# Patient Record
Sex: FEMALE | Race: WHITE | Employment: OTHER | URBAN - METROPOLITAN AREA
[De-identification: names, ages, dates, MRNs, and addresses within clinical notes are randomized per-mention and may not be internally consistent; named-entity substitution may affect disease eponyms.]

---

## 2017-06-13 ENCOUNTER — OFFICE VISIT (OUTPATIENT)
Dept: FAMILY MEDICINE CLINIC | Age: 67
End: 2017-06-13

## 2017-06-13 ENCOUNTER — HOSPITAL ENCOUNTER (OUTPATIENT)
Dept: LAB | Age: 67
Discharge: HOME | End: 2017-06-13
Payer: MEDICARE

## 2017-06-13 VITALS
HEART RATE: 89 BPM | DIASTOLIC BLOOD PRESSURE: 73 MMHG | TEMPERATURE: 98 F | HEIGHT: 65 IN | WEIGHT: 151 LBS | SYSTOLIC BLOOD PRESSURE: 137 MMHG | RESPIRATION RATE: 16 BRPM | BODY MASS INDEX: 25.16 KG/M2 | OXYGEN SATURATION: 98 %

## 2017-06-13 DIAGNOSIS — R42 VERTIGO: Primary | ICD-10-CM

## 2017-06-13 DIAGNOSIS — Z86.73 HISTORY OF CVA (CEREBROVASCULAR ACCIDENT): ICD-10-CM

## 2017-06-13 PROCEDURE — 80053 COMPREHEN METABOLIC PANEL: CPT

## 2017-06-13 PROCEDURE — 85025 COMPLETE CBC W/AUTO DIFF WBC: CPT

## 2017-06-13 PROCEDURE — 80061 LIPID PANEL: CPT

## 2017-06-13 PROCEDURE — 84443 ASSAY THYROID STIM HORMONE: CPT

## 2017-06-13 RX ORDER — ASPIRIN 81 MG/1
81 TABLET ORAL
COMMUNITY
End: 2018-09-13

## 2017-06-13 NOTE — PROGRESS NOTES
1. Have you been to the ER, urgent care clinic since your last visit? Hospitalized since your last visit? No    2. Have you seen or consulted any other health care providers outside of the [address] [place] [address] since your last visit? Include any pap smears or colon screening. No   Chief Complaint   Patient presents with    Establish Care    Complete Physical    Labs Only     Pt present to the office Establish care, CPE, lab    Has hx of mini stroke in 2015 and full neg work up      Chief Complaint   Patient presents with    [facility] Care    Complete Physical    Labs Only     she is a 78y.o. year old female who presents for evalution. Reviewed PmHx, RxHx, FmHx, SocHx, AllgHx and updated and dated in the chart. Patient Active Problem List    Diagnosis    History of CVA (cerebrovascular accident)    Vertigo       Review of Systems - negative except as listed above in the HPI    Objective:     Vitals:    06/13/17 0811   BP: 137/73   Pulse: 89   Resp: 16   Temp: 98 °F (36.7 °C)   TempSrc: Oral   SpO2: 98%   Weight: 151 lb (68.5 kg)   Height: 5' 5\" (1.651 m)     Physical Examination: General appearance - alert, well appearing, and in no distress  Neck - supple, no significant adenopathy  Chest - clear to auscultation, no wheezes, rales or rhonchi, symmetric air entry  Heart - normal rate, regular rhythm, normal S1, S2, no murmurs, rubs, clicks or gallops  Abdomen - soft, nontender, nondistended, no masses or organomegaly  Extremities - peripheral pulses normal, no pedal edema, no clubbing or cyanosis    Assessment/ Plan:   Tasneem was seen today for establish care, complete physical and labs only.     Diagnoses and all orders for this visit:    Vertigo  -     LIPID PANEL  -     METABOLIC PANEL, COMPREHENSIVE  -     CBC WITH AUTOMATED DIFF  -     TSH 3RD GENERATION  -due to old CVA    History of CVA (cerebrovascular accident)  -     LIPID PANEL  -     METABOLIC PANEL, COMPREHENSIVE  -on ASA daily Follow-up Disposition:  Return in about 1 year (around 6/13/2018). I have discussed the diagnosis with the patient and the intended plan as seen in the above orders. The patient understands and agrees with the plan. The patient has received an after-visit summary and questions were answered concerning future plans. Medication Side Effects and Warnings were discussed with patient  Patient Labs were reviewed and or requested:  Patient Past Records were reviewed and or requested    Mari Garcia M.D. There are no Patient Instructions on file for this visit.

## 2017-06-13 NOTE — MR AVS SNAPSHOT
Visit Information Date & Time Provider Department Dept. Phone Encounter #  
 6/13/2017  8:10 AM Amie Muro MD [address] [facility] [phone] [id number] Follow-up Instructions Return in about 1 year (around 6/13/2018). Upcoming Health Maintenance Date Due Hepatitis C Screening 1950 DTaP/Tdap/Td series (1 - Tdap) 11/16/1970 BREAST CANCER SCRN MAMMOGRAM 11/16/1999 FOBT Q 1 YEAR AGE 50-75 11/16/1999 ZOSTER VACCINE AGE 60> 11/16/2009 GLAUCOMA SCREENING Q2Y 11/16/2014 OSTEOPOROSIS SCREENING (DEXA) 11/16/2014 Pneumococcal 65+ Low/Medium Risk (1 of 2 - PCV13) 11/16/2014 MEDICARE YEARLY EXAM 11/16/2014 INFLUENZA AGE 9 TO ADULT 8/1/2017 Allergies as of 6/13/2017  Review Complete On: 6/13/2017 By: Amie Muro MD  
  
 Severity Noted Reaction Type Reactions Ampicillin  06/13/2017    Hives Current Immunizations  Never Reviewed No immunizations on file. Not reviewed this visit You Were Diagnosed With   
  
 Codes Comments Vertigo    -  Primary ICD-10-CM: I34 ICD-9-CM: 780.4 History of CVA (cerebrovascular accident)     ICD-10-CM: Z86.73 
ICD-9-CM: V12.54 Vitals BP Pulse Temp Resp Height(growth percentile) Weight(growth percentile)  
 137/73 89 98 °F (36.7 °C) (Oral) 16 5' 5\" (1.651 m) 151 lb (68.5 kg) SpO2 BMI Smoking Status 98% 25.13 kg/m2 Never Smoker BMI and BSA Data Body Mass Index Body Surface Area  
 25.13 kg/m 2 1.77 m 2 Your Updated Medication List  
  
   
This list is accurate as of: 6/13/17  8:26 AM.  Always use your most recent med list.  
  
  
  
  
 aspirin delayed-release 81 mg tablet Take  by mouth daily. We Performed the Following CBC WITH AUTOMATED DIFF [06603 CPT(R)] LIPID PANEL [60153 CPT(R)] METABOLIC PANEL, COMPREHENSIVE [73146 CPT(R)] TSH 3RD GENERATION [46539 CPT(R)] Follow-up Instructions Return in about 1 year (around 6/13/2018). Introducing [facility] & HEALTH SERVICES! [employer] introduces [employer] patient portal. Now you can access parts of your medical record, email your doctor's office, and request medication refills online. 1. In your internet browser, go to [email or web address] (formerly [email or web address])/[employer] 2. Click on the First Time User? Click Here link in the Sign In box. You will see the New Member Sign Up page. 3. Enter your [employer] Access Code exactly as it appears below. You will not need to use this code after youve completed the sign-up process. If you do not sign up before the expiration date, you must request a new code. · [employer] Access Code: [email or web address] Expires: 9/11/2017  8:26 AM 
 
4. Enter the last four digits of your Social Security Number (xxxx) and Date of Birth (mm/dd/yyyy) as indicated and click Submit. You will be taken to the next sign-up page. 5. Create a Secoo ID. This will be your Secoo login ID and cannot be changed, so think of one that is secure and easy to remember. 6. Create a Secoo password. You can change your password at any time. 7. Enter your Password Reset Question and Answer. This can be used at a later time if you forget your password. 8. Enter your e-mail address. You will receive e-mail notification when new information is available in [address] 9. Click Sign Up. You can now view and download portions of your medical record. 10. Click the Download Summary menu link to download a portable copy of your medical information. If you have questions, please visit the Frequently Asked Questions section of the Secoo website. Remember, Secoo is NOT to be used for urgent needs. For medical emergencies, dial 911. Now available from your iPhone and Android! Please provide this summary of care documentation to your next provider. Your primary care clinician is listed as HARRIETT MARRERO.  If you have any questions after today's visit, please call [phone].

## 2017-06-14 LAB
ALBUMIN SERPL-MCNC: 4.5 G/DL (ref 3.6–4.8)
ALBUMIN/GLOB SERPL: 2 {RATIO} (ref 1.2–2.2)
ALP SERPL-CCNC: 74 IU/L (ref 39–117)
ALT SERPL-CCNC: 12 IU/L (ref 0–32)
AST SERPL-CCNC: 13 IU/L (ref 0–40)
BASOPHILS # BLD AUTO: 0 X10E3/UL (ref 0–0.2)
BASOPHILS NFR BLD AUTO: 0 %
BILIRUB SERPL-MCNC: 0.6 MG/DL (ref 0–1.2)
BUN SERPL-MCNC: 21 MG/DL (ref 8–27)
BUN/CREAT SERPL: 25 (ref 12–28)
CALCIUM SERPL-MCNC: 9.7 MG/DL (ref 8.7–10.3)
CHLORIDE SERPL-SCNC: 101 MMOL/L (ref 96–106)
CHOLEST SERPL-MCNC: 225 MG/DL (ref 100–199)
CO2 SERPL-SCNC: 23 MMOL/L (ref 18–29)
CREAT SERPL-MCNC: 0.85 MG/DL (ref 0.57–1)
EOSINOPHIL # BLD AUTO: 0.1 X10E3/UL (ref 0–0.4)
EOSINOPHIL NFR BLD AUTO: 1 %
ERYTHROCYTE [DISTWIDTH] IN BLOOD BY AUTOMATED COUNT: 14 % (ref 12.3–15.4)
GLOBULIN SER CALC-MCNC: 2.2 G/DL (ref 1.5–4.5)
GLUCOSE SERPL-MCNC: 80 MG/DL (ref 65–99)
HCT VFR BLD AUTO: 43.6 % (ref 34–46.6)
HDLC SERPL-MCNC: 61 MG/DL
HGB BLD-MCNC: 14.4 G/DL (ref 11.1–15.9)
IMM GRANULOCYTES # BLD: 0 X10E3/UL (ref 0–0.1)
IMM GRANULOCYTES NFR BLD: 0 %
INTERPRETATION, 910389: NORMAL
LDLC SERPL CALC-MCNC: 134 MG/DL (ref 0–99)
LYMPHOCYTES # BLD AUTO: 6.3 X10E3/UL (ref 0.7–3.1)
LYMPHOCYTES NFR BLD AUTO: 61 %
MCH RBC QN AUTO: 29.3 PG (ref 26.6–33)
MCHC RBC AUTO-ENTMCNC: 33 G/DL (ref 31.5–35.7)
MCV RBC AUTO: 89 FL (ref 79–97)
MONOCYTES # BLD AUTO: 0.3 X10E3/UL (ref 0.1–0.9)
MONOCYTES NFR BLD AUTO: 3 %
NEUTROPHILS # BLD AUTO: 3.6 X10E3/UL (ref 1.4–7)
NEUTROPHILS NFR BLD AUTO: 35 %
PLATELET # BLD AUTO: 247 X10E3/UL (ref 150–379)
POTASSIUM SERPL-SCNC: 4.9 MMOL/L (ref 3.5–5.2)
PROT SERPL-MCNC: 6.7 G/DL (ref 6–8.5)
RBC # BLD AUTO: 4.92 X10E6/UL (ref 3.77–5.28)
SODIUM SERPL-SCNC: 142 MMOL/L (ref 134–144)
TRIGL SERPL-MCNC: 151 MG/DL (ref 0–149)
TSH SERPL DL<=0.005 MIU/L-ACNC: 1.56 UIU/ML (ref 0.45–4.5)
VLDLC SERPL CALC-MCNC: 30 MG/DL (ref 5–40)
WBC # BLD AUTO: 10.4 X10E3/UL (ref 3.4–10.8)

## 2017-06-14 RX ORDER — ATORVASTATIN CALCIUM 20 MG/1
20 TABLET, FILM COATED ORAL DAILY
Qty: 30 TAB | Refills: 3 | Status: SHIPPED | OUTPATIENT
Start: 2017-06-14 | End: 2017-09-15 | Stop reason: SDUPTHER

## 2017-06-15 ENCOUNTER — DOCUMENTATION ONLY (OUTPATIENT)
Dept: FAMILY MEDICINE CLINIC | Age: 67
End: 2017-06-15

## 2017-06-15 NOTE — PROGRESS NOTES
Rx for statin called into pharmacy. Reviewed dose and directions with patient.  F/u appt for 3 month made for 9/11/2017

## 2017-07-27 ENCOUNTER — OFFICE VISIT (OUTPATIENT)
Dept: FAMILY MEDICINE CLINIC | Age: 67
End: 2017-07-27

## 2017-07-27 VITALS
DIASTOLIC BLOOD PRESSURE: 82 MMHG | SYSTOLIC BLOOD PRESSURE: 131 MMHG | TEMPERATURE: 98.3 F | RESPIRATION RATE: 16 BRPM | BODY MASS INDEX: 24.66 KG/M2 | HEART RATE: 91 BPM | WEIGHT: 148 LBS | HEIGHT: 65 IN | OXYGEN SATURATION: 98 %

## 2017-07-27 DIAGNOSIS — Z71.89 ADVANCE CARE PLANNING: ICD-10-CM

## 2017-07-27 DIAGNOSIS — Z00.00 ROUTINE GENERAL MEDICAL EXAMINATION AT A HEALTH CARE FACILITY: Primary | ICD-10-CM

## 2017-07-27 DIAGNOSIS — R21 RASH: ICD-10-CM

## 2017-07-27 RX ORDER — PREDNISONE 10 MG/1
TABLET ORAL
Qty: 1 PACKAGE | Refills: 0 | Status: SHIPPED | OUTPATIENT
Start: 2017-07-27 | End: 2017-08-04 | Stop reason: SDUPTHER

## 2017-07-27 NOTE — MR AVS SNAPSHOT
Visit Information Date & Time Provider Department Dept. Phone Encounter #  
 7/27/2017  9:45 AM Akira Ospina MD [address] [facility] [phone] [id number] Follow-up Instructions Return if symptoms worsen or fail to improve. Your Appointments 9/11/2017  7:30 AM  
ESTABLISHED PATIENT with Akira Ospina MD  
[address] [facility]) Appt Note: fasting labs, chol meds started last visit [address] [address] [phone]  
  
   
 [address] [address] Upcoming Health Maintenance Date Due Hepatitis C Screening 1950 DTaP/Tdap/Td series (1 - Tdap) 11/16/1970 BREAST CANCER SCRN MAMMOGRAM 11/16/1999 FOBT Q 1 YEAR AGE 50-75 11/16/1999 ZOSTER VACCINE AGE 60> 9/16/2009 GLAUCOMA SCREENING Q2Y 11/16/2014 OSTEOPOROSIS SCREENING (DEXA) 11/16/2014 Pneumococcal 65+ Low/Medium Risk (1 of 2 - PCV13) 11/16/2014 MEDICARE YEARLY EXAM 11/16/2014 INFLUENZA AGE 9 TO ADULT 8/1/2017 Allergies as of 7/27/2017  Review Complete On: 7/27/2017 By: Akira Ospina MD  
  
 Severity Noted Reaction Type Reactions Ampicillin  06/13/2017    Hives Current Immunizations  Never Reviewed No immunizations on file. Not reviewed this visit You Were Diagnosed With   
  
 Codes Comments Rash    -  Primary ICD-10-CM: R21 
ICD-9-CM: 782.1 Vitals BP Pulse Temp Resp Height(growth percentile) Weight(growth percentile) 131/82 91 98.3 °F (36.8 °C) (Oral) 16 5' 5\" (1.651 m) 148 lb (67.1 kg) SpO2 BMI Smoking Status 98% 24.63 kg/m2 Never Smoker BMI and BSA Data Body Mass Index Body Surface Area  
 24.63 kg/m 2 1.75 m 2 Your Updated Medication List  
  
   
This list is accurate as of: 7/27/17 10:11 AM.  Always use your most recent med list.  
  
  
  
  
 aspirin delayed-release 81 mg tablet Take  by mouth daily. atorvastatin 20 mg tablet Commonly known as:  LIPITOR Take 1 Tab by mouth daily. predniSONE 10 mg dose pack Commonly known as:  STERAPRED DS  
6 Day DS taper pack as directed Prescriptions Printed Refills  
 predniSONE (STERAPRED DS) 10 mg dose pack 0 Si Day DS taper pack as directed Class: Print Follow-up Instructions Return if symptoms worsen or fail to improve. Introducing [facility] & HEALTH SERVICES! Frdey Spence introduces [employer] patient portal. Now you can access parts of your medical record, email your doctor's office, and request medication refills online. 1. In your internet browser, go to [email or web address] 2. Click on the First Time User? Click Here link in the Sign In box. You will see the New Member Sign Up page. 3. Enter your [employer] Access Code exactly as it appears below. You will not need to use this code after youve completed the sign-up process. If you do not sign up before the expiration date, you must request a new code. · [employer] Access Code: [email or web address] Expires: 2017  8:26 AM 
 
4. Enter the last four digits of your Social Security Number (xxxx) and Date of Birth (mm/dd/yyyy) as indicated and click Submit. You will be taken to the next sign-up page. 5. Create a [employer] ID. This will be your [employer] login ID and cannot be changed, so think of one that is secure and easy to remember. 6. Create a [employer] password. You can change your password at any time. 7. Enter your Password Reset Question and Answer. This can be used at a later time if you forget your password. 8. Enter your e-mail address. You will receive e-mail notification when new information is available in [address] 9. Click Sign Up. You can now view and download portions of your medical record. 10. Click the Download Summary menu link to download a portable copy of your medical information. If you have questions, please visit the Frequently Asked Questions section of the [employer] website. Remember, [employer] is NOT to be used for urgent needs. For medical emergencies, dial 911. Now available from your iPhone and Android! Please provide this summary of care documentation to your next provider. Your primary care clinician is listed as HARRIETT MARRERO. If you have any questions after today's visit, please call [phone].

## 2017-07-27 NOTE — PROGRESS NOTES
Chief Complaint   Patient presents with    Rash     Pt present to the office for rash    1. Have you been to the ER, urgent care clinic since your last visit? Hospitalized since your last visit? No    2. Have you seen or consulted any other health care providers outside of the [address] [place] [address] since your last visit? Include any pap smears or colon screening. No        Medicare Wellness Exam:    Chief Complaint   Patient presents with    Rash     she is a 78y.o. year old female who presents for evaluation for their Medicare Wellness Visit. Mitcheal [place] is completed and assessed=yes  Depression Screen is completed and assessed=yes  Medication list reviewed and adjusted for accuracy=yes  Immunizations reviewed and updated=yes  Health/Preventative Screenings reviewed and updated=yes  ADL Functions reviewed=yes    Patient Active Problem List    Diagnosis    Advance care planning    History of CVA (cerebrovascular accident)    Vertigo       Reviewed PmHx, RxHx, FmHx, SocHx, AllgHx and updated and dated in the chart. Review of Systems - negative except as listed above in the HPI    Objective:     Vitals:    07/27/17 0953   BP: 131/82   Pulse: 91   Resp: 16   Temp: 98.3 °F (36.8 °C)   TempSrc: Oral   SpO2: 98%   Weight: 148 lb (67.1 kg)   Height: 5' 5\" (1.651 m)     Physical Examination: General appearance - alert, well appearing, and in no distress  Neck - supple, no significant adenopathy  Chest - clear to auscultation, no wheezes, rales or rhonchi, symmetric air entry  Heart - normal rate, regular rhythm, normal S1, S2, no murmurs, rubs, clicks or gallops  Skin - red rash 1mm all over    Assessment/ Plan:   Diagnoses and all orders for this visit:    1. Routine general medical examination at a health care facility  -see below    2. Rash  -     predniSONE (STERAPRED DS) 10 mg dose pack; 6 Day DS taper pack as directed  -chiggers vs other    3.  Advance care planning  -has Lw       -Pain evaluation performed in office  -Cognitive Screen performed in office  -Depression Screen, Fall risks (by up and go test)  and ADL functionality were addressed  -Medication list updated and reviewed for any changes   -A comprehensive review of medical issues and a plan was formulated  -End of life planning was addressed with pt   -Health Screenings for preventions were addressed and a plan was formulated  -Shingles Vaccine was recommended  -Discussed with patient cancer risk factors and appropriate screenings for age  -Patient evaluated for colonoscopy and referred if needed per screeing criteria  -Labs from previous visits were discussed with patient   -Discussed with patient diet and exercise and formulated a plan as needed  -An Advanced care plan was developed with the patient.  -Alcohol screening performed and was negative    -Follow-up Disposition:  Return if symptoms worsen or fail to improve. I have discussed the diagnosis with the patient and the intended plan as seen in the above orders. The patient understands and agrees with the plan. The patient has received an after-visit summary and questions were answered concerning future plans. Medication Side Effects and Warnings were discussed with patien  Patient Labs were reviewed and or requested  Patient Past Records were reviewed and or requested    There are no Patient Instructions on file for this visit.       Dakota Estrella M.D.                '

## 2017-08-04 DIAGNOSIS — R21 RASH: ICD-10-CM

## 2017-08-04 RX ORDER — PREDNISONE 10 MG/1
TABLET ORAL
Qty: 1 PACKAGE | Refills: 0 | Status: SHIPPED | OUTPATIENT
Start: 2017-08-04 | End: 2017-09-11 | Stop reason: ALTCHOICE

## 2017-09-11 ENCOUNTER — OFFICE VISIT (OUTPATIENT)
Dept: FAMILY MEDICINE CLINIC | Age: 67
End: 2017-09-11

## 2017-09-11 ENCOUNTER — HOSPITAL ENCOUNTER (OUTPATIENT)
Dept: LAB | Age: 67
Discharge: HOME | End: 2017-09-11
Payer: MEDICARE

## 2017-09-11 VITALS
DIASTOLIC BLOOD PRESSURE: 84 MMHG | HEART RATE: 77 BPM | WEIGHT: 150 LBS | RESPIRATION RATE: 18 BRPM | SYSTOLIC BLOOD PRESSURE: 130 MMHG | OXYGEN SATURATION: 96 % | BODY MASS INDEX: 24.99 KG/M2 | HEIGHT: 65 IN | TEMPERATURE: 46.8 F

## 2017-09-11 DIAGNOSIS — E78.00 HIGH CHOLESTEROL: Primary | ICD-10-CM

## 2017-09-11 DIAGNOSIS — Z86.73 HISTORY OF CVA (CEREBROVASCULAR ACCIDENT): ICD-10-CM

## 2017-09-11 PROCEDURE — 80061 LIPID PANEL: CPT

## 2017-09-11 PROCEDURE — 80053 COMPREHEN METABOLIC PANEL: CPT

## 2017-09-11 NOTE — PROGRESS NOTES
Patient here for fasting labs. 1. Have you been to the ER, urgent care clinic since your last visit? Hospitalized since your last visit? No    2. Have you seen or consulted any other health care providers outside of the [address] [place] [address] since your last visit? Include any pap smears or colon screening. No       Chief Complaint   Patient presents with    Labs     fasting     She is a 78 y.o. female who presents for evalution. Reviewed PmHx, RxHx, FmHx, SocHx, AllgHx and updated and dated in the chart. Patient Active Problem List    Diagnosis    Advance care planning    History of CVA (cerebrovascular accident)    Vertigo       Review of Systems - negative except as listed above in the HPI    Objective:     Vitals:    09/11/17 0727   BP: 130/84   Pulse: 77   Resp: 18   Temp: (!) 46.8 °F (8.2 °C)   SpO2: 96%   Weight: 150 lb (68 kg)   Height: 5' 5\" (1.651 m)     Physical Examination: General appearance - alert, well appearing, and in no distress  Neck - supple, no significant adenopathy  Chest - clear to auscultation, no wheezes, rales or rhonchi, symmetric air entry  Heart - normal rate, regular rhythm, normal S1, S2, no murmurs, rubs, clicks or gallops    Assessment/ Plan:   Diagnoses and all orders for this visit:    1. High cholesterol  -     LIPID PANEL  -     METABOLIC PANEL, COMPREHENSIVE    2. History of CVA (cerebrovascular accident)  -stable       Follow-up Disposition:  Return in about 6 months (around 3/11/2018). I have discussed the diagnosis with the patient and the intended plan as seen in the above orders. The patient understands and agrees with the plan. The patient has received an after-visit summary and questions were answered concerning future plans. Medication Side Effects and Warnings were discussed with patient  Patient Labs were reviewed and or requested:  Patient Past Records were reviewed and or requested    Ankur Cheng M.D.     There are no Patient Instructions on file for this visit.

## 2017-09-11 NOTE — LETTER
9/12/2017 4:26 PM 
 
Ms. Tawnya Oppenheim [address] [address] Dear Tawnya Oppenheim: 
 
Please find your most recent results below. Resulted Orders LIPID PANEL Result Value Ref Range Cholesterol, total 133 100 - 199 mg/dL Triglyceride 119 0 - 149 mg/dL HDL Cholesterol 64 >39 mg/dL VLDL, calculated 24 5 - 40 mg/dL LDL, calculated 45 0 - 99 mg/dL Narrative Performed at:  [address]  [id number] : Orlando Bojorquez MD, Phone:  [phone] METABOLIC PANEL, COMPREHENSIVE Result Value Ref Range Glucose 77 65 - 99 mg/dL BUN 14 8 - 27 mg/dL Creatinine 0.80 0.57 - 1.00 mg/dL GFR est non-AA 77 >59 mL/min/1.73 GFR est AA 88 >59 mL/min/1.73  
 BUN/Creatinine ratio 18 12 - 28 Sodium 143 134 - 144 mmol/L Potassium 4.4 3.5 - 5.2 mmol/L Chloride 104 96 - 106 mmol/L  
 CO2 26 18 - 29 mmol/L Calcium 8.8 8.7 - 10.3 mg/dL Protein, total 6.3 6.0 - 8.5 g/dL Albumin 4.3 3.6 - 4.8 g/dL GLOBULIN, TOTAL 2.0 1.5 - 4.5 g/dL A-G Ratio 2.2 1.2 - 2.2 Bilirubin, total 0.5 0.0 - 1.2 mg/dL Alk. phosphatase 60 39 - 117 IU/L  
 AST (SGOT) 12 0 - 40 IU/L  
 ALT (SGPT) 11 0 - 32 IU/L Narrative Performed at:  [address]  [id number] : Orlando Bojorquez MD, Phone:  [phone] CVD REPORT Result Value Ref Range INTERPRETATION Note Comment:  
   Supplement report is available. Narrative Performed at:  [address] [place] [address]  [id number] : Susan Peters PhD, Phone:  [phone] RECOMMENDATIONS: 
After reviewing your medical history and your lab results, they appear at goal for you!    
 
Let us make 2017 a great year! Dr. Pascual Chavez M.D. Good Help to those in Need! !!  
    
   
 
 
 
Please call me if you have any questions: [phone] Sincerely, Filomena Nayak, MD

## 2017-09-11 NOTE — MR AVS SNAPSHOT
Visit Information Date & Time Provider Department Dept. Phone Encounter #  
 9/11/2017  7:30 AM Gabriela Morales MD [address] [facility] [phone] [id number] Follow-up Instructions Return in about 6 months (around 3/11/2018). Upcoming Health Maintenance Date Due DTaP/Tdap/Td series (1 - Tdap) 11/16/1970 BREAST CANCER SCRN MAMMOGRAM 11/16/1999 FOBT Q 1 YEAR AGE 50-75 11/16/1999 ZOSTER VACCINE AGE 60> 9/16/2009 GLAUCOMA SCREENING Q2Y 11/16/2014 OSTEOPOROSIS SCREENING (DEXA) 11/16/2014 Pneumococcal 65+ Low/Medium Risk (1 of 2 - PCV13) 11/16/2014 MEDICARE YEARLY EXAM 7/28/2018 Allergies as of 9/11/2017  Review Complete On: 9/11/2017 By: Gabriela Morales MD  
  
 Severity Noted Reaction Type Reactions Ampicillin  06/13/2017    Hives Current Immunizations  Never Reviewed No immunizations on file. Not reviewed this visit You Were Diagnosed With   
  
 Codes Comments High cholesterol    -  Primary ICD-10-CM: E78.00 ICD-9-CM: 272.0 History of CVA (cerebrovascular accident)     ICD-10-CM: Z86.73 
ICD-9-CM: V12.54 Vitals BP Pulse Temp Resp Height(growth percentile) Weight(growth percentile) 130/84 77 (!) 46.8 °F (8.2 °C) 18 5' 5\" (1.651 m) 150 lb (68 kg) SpO2 BMI OB Status Smoking Status 96% 24.96 kg/m2 Menopause Never Smoker Vitals History BMI and BSA Data Body Mass Index Body Surface Area 24.96 kg/m 2 1.77 m 2 Preferred Pharmacy Pharmacy Name Phone [facility] [phone] Your Updated Medication List  
  
   
This list is accurate as of: 9/11/17  7:38 AM.  Always use your most recent med list.  
  
  
  
  
 aspirin delayed-release 81 mg tablet Take  by mouth daily. atorvastatin 20 mg tablet Commonly known as:  LIPITOR Take 1 Tab by mouth daily. We Performed the Following LIPID PANEL [37619 CPT(R)] METABOLIC PANEL, COMPREHENSIVE [52038 CPT(R)] Follow-up Instructions Return in about 6 months (around 3/11/2018). Introducing [facility] & [facility]! Porfirio Echavarrai introduces [employer] patient portal. Now you can access parts of your medical record, email your doctor's office, and request medication refills online. 1. In your internet browser, go to [email or web address] 2. Click on the First Time User? Click Here link in the Sign In box. You will see the New Member Sign Up page. 3. Enter your [employer] Access Code exactly as it appears below. You will not need to use this code after youve completed the sign-up process. If you do not sign up before the expiration date, you must request a new code. · [employer] Access Code: [email or web address] Expires: 9/11/2017  8:26 AM 
 
4. Enter the last four digits of your Social Security Number (xxxx) and Date of Birth (mm/dd/yyyy) as indicated and click Submit. You will be taken to the next sign-up page. 5. Create a [employer] ID. This will be your [employer] login ID and cannot be changed, so think of one that is secure and easy to remember. 6. Create a [employer] password. You can change your password at any time. 7. Enter your Password Reset Question and Answer. This can be used at a later time if you forget your password. 8. Enter your e-mail address. You will receive e-mail notification when new information is available in [address] 9. Click Sign Up. You can now view and download portions of your medical record. 10. Click the Download Summary menu link to download a portable copy of your medical information. If you have questions, please visit the Frequently Asked Questions section of the [employer] website. Remember, [employer] is NOT to be used for urgent needs. For medical emergencies, dial 911. Now available from your iPhone and Android! Please provide this summary of care documentation to your next provider. Your primary care clinician is listed as HARRIETT MARRERO. If you have any questions after today's visit, please call [phone].

## 2017-09-12 LAB
ALBUMIN SERPL-MCNC: 4.3 G/DL (ref 3.6–4.8)
ALBUMIN/GLOB SERPL: 2.2 {RATIO} (ref 1.2–2.2)
ALP SERPL-CCNC: 60 IU/L (ref 39–117)
ALT SERPL-CCNC: 11 IU/L (ref 0–32)
AST SERPL-CCNC: 12 IU/L (ref 0–40)
BILIRUB SERPL-MCNC: 0.5 MG/DL (ref 0–1.2)
BUN SERPL-MCNC: 14 MG/DL (ref 8–27)
BUN/CREAT SERPL: 18 (ref 12–28)
CALCIUM SERPL-MCNC: 8.8 MG/DL (ref 8.7–10.3)
CHLORIDE SERPL-SCNC: 104 MMOL/L (ref 96–106)
CHOLEST SERPL-MCNC: 133 MG/DL (ref 100–199)
CO2 SERPL-SCNC: 26 MMOL/L (ref 18–29)
CREAT SERPL-MCNC: 0.8 MG/DL (ref 0.57–1)
GLOBULIN SER CALC-MCNC: 2 G/DL (ref 1.5–4.5)
GLUCOSE SERPL-MCNC: 77 MG/DL (ref 65–99)
HDLC SERPL-MCNC: 64 MG/DL
INTERPRETATION, 910389: NORMAL
LDLC SERPL CALC-MCNC: 45 MG/DL (ref 0–99)
POTASSIUM SERPL-SCNC: 4.4 MMOL/L (ref 3.5–5.2)
PROT SERPL-MCNC: 6.3 G/DL (ref 6–8.5)
SODIUM SERPL-SCNC: 143 MMOL/L (ref 134–144)
TRIGL SERPL-MCNC: 119 MG/DL (ref 0–149)
VLDLC SERPL CALC-MCNC: 24 MG/DL (ref 5–40)

## 2017-09-12 NOTE — PROGRESS NOTES
A letter was sent, to the address on file, with lab results and Dr. Radha Orellana recommendations for the pt.

## 2017-09-12 NOTE — PROGRESS NOTES
After reviewing your medical history and your lab results, they appear at goal for you! Let us make 2017 a great year! Dr. Lynn Swift M.D.       Good Help to those in Need!!!

## 2017-10-10 RX ORDER — VALACYCLOVIR HYDROCHLORIDE 500 MG/1
500 TABLET, FILM COATED ORAL 2 TIMES DAILY
Qty: 14 TAB | Refills: 0 | Status: SHIPPED | OUTPATIENT
Start: 2017-10-10 | End: 2018-01-02 | Stop reason: SDUPTHER

## 2018-01-02 RX ORDER — VALACYCLOVIR HYDROCHLORIDE 500 MG/1
500 TABLET, FILM COATED ORAL 2 TIMES DAILY
Qty: 14 TAB | Refills: 5 | Status: SHIPPED | OUTPATIENT
Start: 2018-01-02 | End: 2020-05-01 | Stop reason: SDUPTHER

## 2018-02-23 RX ORDER — ATORVASTATIN CALCIUM 20 MG/1
20 TABLET, FILM COATED ORAL DAILY
Qty: 30 TAB | Refills: 5 | Status: SHIPPED | OUTPATIENT
Start: 2018-02-23 | End: 2018-03-02 | Stop reason: SDUPTHER

## 2018-03-02 RX ORDER — ATORVASTATIN CALCIUM 20 MG/1
20 TABLET, FILM COATED ORAL DAILY
Qty: 90 TAB | Refills: 1 | Status: SHIPPED | OUTPATIENT
Start: 2018-03-02 | End: 2018-09-12

## 2018-03-12 ENCOUNTER — OFFICE VISIT (OUTPATIENT)
Dept: FAMILY MEDICINE CLINIC | Age: 68
End: 2018-03-12

## 2018-03-12 ENCOUNTER — HOSPITAL ENCOUNTER (OUTPATIENT)
Dept: LAB | Age: 68
Discharge: HOME | End: 2018-03-12
Payer: MEDICARE

## 2018-03-12 VITALS
TEMPERATURE: 98.4 F | RESPIRATION RATE: 16 BRPM | OXYGEN SATURATION: 98 % | SYSTOLIC BLOOD PRESSURE: 126 MMHG | DIASTOLIC BLOOD PRESSURE: 78 MMHG | HEIGHT: 65 IN | HEART RATE: 88 BPM | BODY MASS INDEX: 26.37 KG/M2 | WEIGHT: 158.3 LBS

## 2018-03-12 DIAGNOSIS — R42 VERTIGO: ICD-10-CM

## 2018-03-12 PROCEDURE — 80061 LIPID PANEL: CPT

## 2018-03-12 PROCEDURE — 80053 COMPREHEN METABOLIC PANEL: CPT

## 2018-03-12 NOTE — PROGRESS NOTES
Chief Complaint   Patient presents with    Cholesterol Problem    Labs     1. Have you been to the ER, urgent care clinic since your last visit? Hospitalized since your last visit? No    2. Have you seen or consulted any other health care providers outside of the [facility] since your last visit? Include any pap smears or colon screening. No         Chief Complaint   Patient presents with    Cholesterol Problem    Labs     She is a 75 y.o. female who presents for evalution. Reviewed PmHx, RxHx, FmHx, SocHx, AllgHx and updated and dated in the chart. Patient Active Problem List    Diagnosis    Advance care planning    History of CVA (cerebrovascular accident)    Vertigo       Review of Systems - negative except as listed above in the HPI    Objective:     Vitals:    03/12/18 0721   BP: 126/78   Pulse: 88   Resp: 16   Temp: 98.4 °F (36.9 °C)   SpO2: 98%   Weight: 158 lb 4.8 oz (71.8 kg)   Height: 5' 5\" (1.651 m)     Physical Examination: General appearance - alert, well appearing, and in no distress  Chest - clear to auscultation, no wheezes, rales or rhonchi, symmetric air entry  Heart - normal rate, regular rhythm, normal S1, S2, no murmurs, rubs, clicks or gallops    Assessment/ Plan:   Diagnoses and all orders for this visit:    1. Weakness due to cerebrovascular accident (CVA) ([address]  -     METABOLIC PANEL, COMPREHENSIVE  -     LIPID PANEL    2. Vertigo  -stable       Follow-up Disposition:  Return in about 6 months (around 9/12/2018). I have discussed the diagnosis with the patient and the intended plan as seen in the above orders. The patient understands and agrees with the plan. The patient has received an after-visit summary and questions were answered concerning future plans. Medication Side Effects and Warnings were discussed with patient  Patient Labs were reviewed and or requested:  Patient Past Records were reviewed and or requested    Nicolasa Taylor M.D.     There are no Patient Instructions on file for this visit.

## 2018-03-12 NOTE — MR AVS SNAPSHOT
[address] [place] [address] 
[phone] Patient: Abdirahman Wolff MRN: [id number] UEM:61/43/7641 Visit Information Date & Time Provider Department Dept. Phone Encounter #  
 3/12/2018  7:15 AM Chai Payton MD 5900 [facility] [phone] [id number] Follow-up Instructions Return in about 6 months (around 9/12/2018). Upcoming Health Maintenance Date Due DTaP/Tdap/Td series (1 - Tdap) 11/16/1970 BREAST CANCER SCRN MAMMOGRAM 11/16/1999 FOBT Q 1 YEAR AGE 50-75 11/16/1999 ZOSTER VACCINE AGE 60> 9/16/2009 GLAUCOMA SCREENING Q2Y 11/16/2014 Bone Densitometry (Dexa) Screening 11/16/2014 Pneumococcal 65+ Low/Medium Risk (1 of 2 - PCV13) 11/16/2014 MEDICARE YEARLY EXAM 7/28/2018 Allergies as of 3/12/2018  Review Complete On: 3/12/2018 By: Chai Payton MD  
  
 Severity Noted Reaction Type Reactions Ampicillin  06/13/2017    Hives Current Immunizations  Never Reviewed No immunizations on file. Not reviewed this visit You Were Diagnosed With   
  
 Codes Comments Weakness due to cerebrovascular accident (CVA) ([facility] [address]    -  Primary ICD-10-CM: I63.9, R53.1 ICD-9-CM: 434.91, 780.79 Vertigo     ICD-10-CM: M42 ICD-9-CM: 780.4 Vitals BP Pulse Temp Resp Height(growth percentile) Weight(growth percentile) 126/78 88 98.4 °F (36.9 °C) 16 5' 5\" (1.651 m) 158 lb 4.8 oz (71.8 kg) SpO2 BMI OB Status Smoking Status 98% 26.34 kg/m2 Menopause Never Smoker Vitals History BMI and BSA Data Body Mass Index Body Surface Area  
 26.34 kg/m 2 1.81 m 2 Preferred Pharmacy Pharmacy Name Phone [phone] Your Updated Medication List  
  
   
This list is accurate as of 3/12/18  7:42 AM.  Always use your most recent med list.  
  
  
  
  
 aspirin delayed-release 81 mg tablet Take  by mouth daily. atorvastatin 20 mg tablet Commonly known as:  LIPITOR Take 1 Tab by mouth daily. We Performed the Following LIPID PANEL [56132 CPT(R)] METABOLIC PANEL, COMPREHENSIVE [57109 CPT(R)] Follow-up Instructions Return in about 6 months (around 9/12/2018). Introducing [facility] & HEALTH SERVICES! Gus Langford introduces [employer] patient portal. Now you can access parts of your medical record, email your doctor's office, and request medication refills online. 1. In your internet browser, go to [email or web address] 2. Click on the First Time User? Click Here link in the Sign In box. You will see the New Member Sign Up page. 3. Enter your [employer] Access Code exactly as it appears below. You will not need to use this code after youve completed the sign-up process. If you do not sign up before the expiration date, you must request a new code. · [employer] Access Code: [email or web address] Expires: 6/10/2018  7:42 AM 
 
4. Enter the last four digits of your Social Security Number (xxxx) and Date of Birth (mm/dd/yyyy) as indicated and click Submit. You will be taken to the next sign-up page. 5. Create a [employer] ID. This will be your [employer] login ID and cannot be changed, so think of one that is secure and easy to remember. 6. Create a [employer] password. You can change your password at any time. 7. Enter your Password Reset Question and Answer. This can be used at a later time if you forget your password. 8. Enter your e-mail address. You will receive e-mail notification when new information is available in [address] 9. Click Sign Up. You can now view and download portions of your medical record. 10. Click the Download Summary menu link to download a portable copy of your medical information.  
 
If you have questions, please visit the Frequently Asked Questions section of the [employer]. Remember, [employer] is NOT to be used for urgent needs. For medical emergencies, dial 911. Now available from your iPhone and Android! Please provide this summary of care documentation to your next provider. Your primary care clinician is listed as HARRIETT MARRERO. If you have any questions after today's visit, please call [phone].

## 2018-03-13 LAB
ALBUMIN SERPL-MCNC: 4.5 G/DL (ref 3.6–4.8)
ALBUMIN/GLOB SERPL: 2.4 {RATIO} (ref 1.2–2.2)
ALP SERPL-CCNC: 75 IU/L (ref 39–117)
ALT SERPL-CCNC: 18 IU/L (ref 0–32)
AST SERPL-CCNC: 15 IU/L (ref 0–40)
BILIRUB SERPL-MCNC: 0.7 MG/DL (ref 0–1.2)
BUN SERPL-MCNC: 16 MG/DL (ref 8–27)
BUN/CREAT SERPL: 22 (ref 12–28)
CALCIUM SERPL-MCNC: 9.1 MG/DL (ref 8.7–10.3)
CHLORIDE SERPL-SCNC: 101 MMOL/L (ref 96–106)
CHOLEST SERPL-MCNC: 150 MG/DL (ref 100–199)
CO2 SERPL-SCNC: 24 MMOL/L (ref 18–29)
CREAT SERPL-MCNC: 0.73 MG/DL (ref 0.57–1)
GFR SERPLBLD CREATININE-BSD FMLA CKD-EPI: 85 ML/MIN/1.73
GFR SERPLBLD CREATININE-BSD FMLA CKD-EPI: 98 ML/MIN/1.73
GLOBULIN SER CALC-MCNC: 1.9 G/DL (ref 1.5–4.5)
GLUCOSE SERPL-MCNC: 82 MG/DL (ref 65–99)
HDLC SERPL-MCNC: 58 MG/DL
INTERPRETATION, 910389: NORMAL
LDLC SERPL CALC-MCNC: 67 MG/DL (ref 0–99)
POTASSIUM SERPL-SCNC: 4.5 MMOL/L (ref 3.5–5.2)
PROT SERPL-MCNC: 6.4 G/DL (ref 6–8.5)
SODIUM SERPL-SCNC: 144 MMOL/L (ref 134–144)
TRIGL SERPL-MCNC: 127 MG/DL (ref 0–149)
VLDLC SERPL CALC-MCNC: 25 MG/DL (ref 5–40)

## 2018-03-13 NOTE — PROGRESS NOTES
After reviewing your labs, I believe they are within normal  limits for your age and let us stay with our current plan of action. In addition, you may receive a [employer] from our office. Thank you in advance for filling this out for us, and if you cannot   give a 10 on our ratings, please reach out to us and let us know  what we can do better for you! We strive for a ten, and while we   may not be perfect 100% of the time, we always try our best for  our patients! Abdifatah Arauz M.D.   Good Help to Those in Need

## 2018-04-24 ENCOUNTER — OFFICE VISIT (OUTPATIENT)
Dept: FAMILY MEDICINE CLINIC | Age: 68
End: 2018-04-24

## 2018-04-24 VITALS
DIASTOLIC BLOOD PRESSURE: 77 MMHG | WEIGHT: 158.6 LBS | RESPIRATION RATE: 16 BRPM | TEMPERATURE: 97.9 F | HEIGHT: 65 IN | BODY MASS INDEX: 26.42 KG/M2 | HEART RATE: 81 BPM | OXYGEN SATURATION: 97 % | SYSTOLIC BLOOD PRESSURE: 112 MMHG

## 2018-04-24 DIAGNOSIS — I63.49 CEREBROVASCULAR ACCIDENT (CVA) DUE TO EMBOLISM OF OTHER CEREBRAL ARTERY (HCC): ICD-10-CM

## 2018-04-24 DIAGNOSIS — R42 VERTIGO: Primary | ICD-10-CM

## 2018-04-24 PROBLEM — Z86.73 HISTORY OF CVA (CEREBROVASCULAR ACCIDENT): Status: RESOLVED | Noted: 2017-06-13 | Resolved: 2018-04-24

## 2018-04-24 NOTE — PROGRESS NOTES
Chief Complaint   Patient presents with    Annual Wellness Visit    Dizziness    Sleep Problem     1. Have you been to the ER, urgent care clinic since your last visit? Hospitalized since your last visit? No    2. Have you seen or consulted any other health care providers outside of the [address] [place] [address] since your last visit? Include any pap smears or colon screening.  No    No visit--too soon

## 2018-07-23 ENCOUNTER — OFFICE VISIT (OUTPATIENT)
Dept: FAMILY MEDICINE CLINIC | Age: 68
End: 2018-07-23

## 2018-07-23 VITALS
SYSTOLIC BLOOD PRESSURE: 156 MMHG | WEIGHT: 156 LBS | HEIGHT: 65 IN | RESPIRATION RATE: 16 BRPM | HEART RATE: 80 BPM | DIASTOLIC BLOOD PRESSURE: 88 MMHG | BODY MASS INDEX: 25.99 KG/M2 | TEMPERATURE: 98 F | OXYGEN SATURATION: 99 %

## 2018-07-23 DIAGNOSIS — S80.869A FLEA BITE OF LOWER LEG, UNSPECIFIED LATERALITY, INITIAL ENCOUNTER: Primary | ICD-10-CM
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
## 2018-07-23 DIAGNOSIS — L28.2 PRURITIC RASH: ICD-10-CM

## 2018-07-23 RX ORDER — PREDNISONE 5 MG/1
TABLET ORAL
Qty: 21 TAB | Refills: 0 | Status: SHIPPED | OUTPATIENT
Start: 2018-07-23 | End: 2018-08-13 | Stop reason: ALTCHOICE

## 2018-07-23 RX ORDER — HYDROXYZINE PAMOATE 25 MG/1
25 CAPSULE ORAL
Qty: 45 CAP | Refills: 0 | Status: SHIPPED | OUTPATIENT
Start: 2018-07-23 | End: 2018-08-06

## 2018-07-23 NOTE — PROGRESS NOTES
1. Have you been to the ER, urgent care clinic since your last visit? Hospitalized since your last visit? No    2. Have you seen or consulted any other health care providers outside of the [address] [place] [address] since your last visit? Include any pap smears or colon screening. No     Chief Complaint   Patient presents with    Skin Problem     Itching all over, x3 days     Subjective: (As above and below)     Chief Complaint   Patient presents with    Skin Problem     Itching all over, x3 days     she is a 75y.o. year old female who presents for evaluation. Reviewed PmHx, RxHx, FmHx, SocHx, AllgHx and updated in chart. Review of Systems - negative except as listed above    Objective:     Vitals:    07/23/18 0727   BP: 156/88   Pulse: 80   Resp: 16   Temp: 98 °F (36.7 °C)   TempSrc: Oral   SpO2: 99%   Weight: 156 lb (70.8 kg)   Height: 5' 5\" (1.651 m)     Physical Examination: General appearance - alert, well appearing, and in no distress  Mental status - normal mood, behavior, speech, dress, motor activity, and thought processes  Mouth - mucous membranes moist, pharynx normal without lesions  Chest - clear to auscultation, no wheezes, rales or rhonchi, symmetric air entry  Heart - normal rate, regular rhythm, normal S1, S2, no murmurs, rubs, clicks or gallops  Skin - multiple big bites on ankles, back, arms and waist band    Assessment/ Plan:   1. Flea bite of lower leg, unspecified laterality, initial encounter  2. Pruritic rash  Orders Placed This Encounter    predniSONE (STERAPRED) 5 mg dose pack     Sig: See administration instruction per 5mg dose pack     Dispense:  21 Tab     Refill:  0    hydrOXYzine pamoate (VISTARIL) 25 mg capsule     Sig: Take 1 Cap by mouth three (3) times daily as needed for Itching for up to 14 days.      Dispense:  45 Cap     Refill:  0     Reviewed monitoring environment, checking for bugs and flea treatment     Follow-up Disposition: As needed  I have discussed the diagnosis with the patient and the intended plan as seen in the above orders. The patient has received an after-visit summary and questions were answered concerning future plans.      Medication Side Effects and Warnings were discussed with patient: yes  Patient Labs were reviewed: yes  Patient Past Records were reviewed:  yes    Kody Barrios M.D.

## 2018-07-23 NOTE — MR AVS SNAPSHOT
[address] [address] 
[phone] Patient: Simón Granda MRN: [id number] KOD:81/22/7519 Visit Information Date & Time Provider Department Dept. Phone Encounter #  
 7/23/2018  7:30 AM Karen Lobato MD 5900 [facility] [phone] [id number] Your Appointments 8/13/2018  9:30 AM  
Medicare Physical with Wesley Rice MD  
[address] [place] [address] [employer] [facility]) Appt Note: medicare annual wellness; RS from 6-25-18  
 [address] [address]  
[phone]  
  
   
 [address] [address] [address]  
  
    
 9/13/2018  7:15 AM  
ESTABLISHED PATIENT with Wesley Rice MD  
59[address] [facility] Ghada Muller) Appt Note: follow up  
 [address] [address]  
[phone]  
  
   
 [address] [address] Upcoming Health Maintenance Date Due DTaP/Tdap/Td series (1 - Tdap) 11/16/1970 FOBT Q 1 YEAR AGE 50-75 11/16/1999 ZOSTER VACCINE AGE 60> 9/16/2009 GLAUCOMA SCREENING Q2Y 11/16/2014 Bone Densitometry (Dexa) Screening 11/16/2014 Pneumococcal 65+ Low/Medium Risk (1 of 2 - PCV13) 11/16/2014 MEDICARE YEARLY EXAM 7/28/2018 Influenza Age 5 to Adult 8/1/2018 BREAST CANCER SCRN MAMMOGRAM 4/20/2020 Allergies as of 7/23/2018  Review Complete On: 7/23/2018 By: Karen Lobato MD  
  
 Severity Noted Reaction Type Reactions Ampicillin  06/13/2017    Hives Current Immunizations  Never Reviewed No immunizations on file. Not reviewed this visit You Were Diagnosed With   
  
 Codes Comments Flea bite of lower leg, unspecified laterality, initial encounter    -  Primary ICD-10-CM: N37.553T, N81. Crissie Pel ICD-9-CM: 916.4, E906.4 Pruritic rash     ICD-10-CM: L28.2 ICD-9-CM: 698.2 Vitals BP Pulse Temp Resp Height(growth percentile) Weight(growth percentile) 156/88 80 98 °F (36.7 °C) (Oral) 16 5' 5\" (1.651 m) 156 lb (70.8 kg) SpO2 BMI OB Status Smoking Status 99% 25.96 kg/m2 Menopause Never Smoker Vitals History BMI and BSA Data Body Mass Index Body Surface Area  
 25.96 kg/m 2 1.8 m 2 Preferred Pharmacy Pharmacy Name Phone [address], [address] [phone] Your Updated Medication List  
  
   
This list is accurate as of 7/23/18  7:52 AM.  Always use your most recent med list.  
  
  
  
  
 aspirin delayed-release 81 mg tablet Take  by mouth daily. atorvastatin 20 mg tablet Commonly known as:  LIPITOR Take 1 Tab by mouth daily. hydrOXYzine pamoate 25 mg capsule Commonly known as:  VISTARIL Take 1 Cap by mouth three (3) times daily as needed for Itching for up to 14 days. predniSONE 5 mg dose pack Commonly known as:  STERAPRED See administration instruction per 5mg dose pack Prescriptions Sent to Pharmacy Refills  
 predniSONE (STERAPRED) 5 mg dose pack 0 Sig: See administration instruction per 5mg dose pack Class: Normal  
 Pharmacy: [address] [place] [address] Ph #: [phone]  
 hydrOXYzine pamoate (VISTARIL) 25 mg capsule 0 Sig: Take 1 Cap by mouth three (3) times daily as needed for Itching for up to 14 days. Class: Normal  
 Pharmacy: [address] Ph #: [phone] Route: Oral  
  
Introducing [facility] & HEALTH SERVICES! [facility] introduces [employer] patient portal. Now you can access parts of your medical record, email your doctor's office, and request medication refills online. 1. In your internet browser, go to [email or web address] 2. Click on the First Time User? Click Here link in the Sign In box. You will see the New Member Sign Up page. 3. Enter your [email or web address] Access Code exactly as it appears below. You will not need to use this code after youve completed the sign-up process. If you do not sign up before the expiration date, you must request a new code. · [email or web address] Access Code: [email or web address] Expires: 10/21/2018  7:46 AM 
 
4. Enter the last four digits of your Social Security Number (xxxx) and Date of Birth (mm/dd/yyyy) as indicated and click Submit. You will be taken to the next sign-up page. 5. Create a [employer] ID. This will be your [employer] login ID and cannot be changed, so think of one that is secure and easy to remember. 6. Create a [employer] password. You can change your password at any time. 7. Enter your Password Reset Question and Answer. This can be used at a later time if you forget your password. 8. Enter your e-mail address. You will receive e-mail notification when new information is available in [address] 9. Click Sign Up. You can now view and download portions of your medical record. 10. Click the Download Summary menu link to download a portable copy of your medical information. If you have questions, please visit the Frequently Asked Questions section of the [email or web address] website. Remember, [employer] is NOT to be used for urgent needs. For medical emergencies, dial 911. Now available from your iPhone and Android! Please provide this summary of care documentation to your next provider. Your primary care clinician is listed as HARRIETT MARRERO. If you have any questions after today's visit, please call [phone].

## 2018-08-13 ENCOUNTER — HOSPITAL ENCOUNTER (OUTPATIENT)
Dept: LAB | Age: 68
Discharge: HOME | End: 2018-08-13
Payer: MEDICARE

## 2018-08-13 ENCOUNTER — OFFICE VISIT (OUTPATIENT)
Dept: FAMILY MEDICINE CLINIC | Age: 68
End: 2018-08-13

## 2018-08-13 VITALS
RESPIRATION RATE: 20 BRPM | HEIGHT: 65 IN | WEIGHT: 155 LBS | HEART RATE: 88 BPM | SYSTOLIC BLOOD PRESSURE: 123 MMHG | OXYGEN SATURATION: 97 % | BODY MASS INDEX: 25.83 KG/M2 | DIASTOLIC BLOOD PRESSURE: 78 MMHG | TEMPERATURE: 98.3 F

## 2018-08-13 DIAGNOSIS — E78.00 HIGH CHOLESTEROL: ICD-10-CM

## 2018-08-13 DIAGNOSIS — I63.49 CEREBROVASCULAR ACCIDENT (CVA) DUE TO EMBOLISM OF OTHER CEREBRAL ARTERY (HCC): ICD-10-CM

## 2018-08-13 DIAGNOSIS — Z71.89 ADVANCE CARE PLANNING: ICD-10-CM

## 2018-08-13 DIAGNOSIS — Z00.00 ROUTINE GENERAL MEDICAL EXAMINATION AT A HEALTH CARE FACILITY: Primary | ICD-10-CM

## 2018-08-13 PROCEDURE — 84443 ASSAY THYROID STIM HORMONE: CPT

## 2018-08-13 PROCEDURE — 80061 LIPID PANEL: CPT

## 2018-08-13 PROCEDURE — 85025 COMPLETE CBC W/AUTO DIFF WBC: CPT

## 2018-08-13 PROCEDURE — 80053 COMPREHEN METABOLIC PANEL: CPT

## 2018-08-13 NOTE — PROGRESS NOTES
Patient here for annual wellness visit. 1. Have you been to the ER, urgent care clinic since your last visit? Hospitalized since your last visit? No    2. Have you seen or consulted any other health care providers outside of the [address] [place] [address] since your last visit? Include any pap smears or colon screening. No         Medicare Wellness Exam:    Chief Complaint   Patient presents with    Annual Wellness Visit     fasting     she is a 75y.o. year old female who presents for evaluation for their Medicare Wellness Visit. Serge Candace is completed and assessed=yes  Depression Screen is completed and assessed=yes  Medication list reviewed and adjusted for accuracy=yes  Immunizations reviewed and updated=yes  Health/Preventative Screenings reviewed and updated=yes  ADL Functions reviewed=yes    Patient Active Problem List    Diagnosis    High cholesterol    Stroke syndrome ([address] [address]    Advance care planning    Vertigo       Reviewed PmHx, RxHx, FmHx, SocHx, AllgHx and updated and dated in the chart. Review of Systems - negative except as listed above in the HPI    Objective:     Vitals:    08/13/18 0934   BP: 123/78   Pulse: 88   Resp: 20   Temp: 98.3 °F (36.8 °C)   SpO2: 97%   Weight: 155 lb (70.3 kg)   Height: 5' 5\" (1.651 m)     Physical Examination: General appearance - alert, well appearing, and in no distress  Chest - clear to auscultation, no wheezes, rales or rhonchi, symmetric air entry  Heart - normal rate, regular rhythm, normal S1, S2, no murmurs, rubs, clicks or gallops  Abdomen - soft, nontender, nondistended, no masses or organomegaly      Assessment/ Plan:   Diagnoses and all orders for this visit:    1. Routine general medical examination at a health care facility  -see below    2. Cerebrovascular accident (CVA) due to embolism of other cerebral artery (HCC)  -     LIPID PANEL  -     METABOLIC PANEL, COMPREHENSIVE  -     CBC WITH AUTOMATED DIFF  -     TSH 3RD GENERATION    3. Advance care planning  -I discussed with patient living will and gave a legal form for patient to fill out and bring back to the office. 4. High cholesterol  -on rx         -Pain evaluation performed in office  -Cognitive Screen performed in office  -Depression Screen, Fall risks (by up and go test)  and ADL functionality were addressed  -Medication list updated and reviewed for any changes   -A comprehensive review of medical issues and a plan was formulated  -End of life planning was addressed with pt   -Health Screenings for preventions were addressed and a plan was formulated  -Shingles Vaccine was recommended  -Discussed with patient cancer risk factors and appropriate screenings for age  -Patient evaluated for colonoscopy and referred if needed per screeing criteria  -Labs from previous visits were discussed with patient   -Discussed with patient diet and exercise and formulated a plan as needed  -An Advanced care plan was developed with the patient.  -Alcohol screening performed and was negative    -Follow-up Disposition:  Return in about 6 months (around 2/13/2019). I have discussed the diagnosis with the patient and the intended plan as seen in the above orders. The patient understands and agrees with the plan. The patient has received an after-visit summary and questions were answered concerning future plans. Medication Side Effects and Warnings were discussed with patien  Patient Labs were reviewed and or requested  Patient Past Records were reviewed and or requested    There are no Patient Instructions on file for this visit.       Graciela Cartwright M.D.

## 2018-08-13 NOTE — MR AVS SNAPSHOT
[address] [address] [phone] Patient: Giles Tejada MRN: [id number] QCS:11/56/4760 Visit Information Date & Time Provider Department Dept. Phone Encounter #  
 8/13/2018  9:30 AM Nina Osborne MD 5900 [facility] [phone] [id number] Follow-up Instructions Return in about 6 months (around 2/13/2019). Your Appointments 9/13/2018  7:15 AM  
ESTABLISHED PATIENT with Nina Osborne MD  
5900 [facility] [address]) Appt Note: follow up  
 [address] [place] [address] Road [id number] [phone]  
  
   
 [address] [place] [address] Road [address] Upcoming Health Maintenance Date Due DTaP/Tdap/Td series (1 - Tdap) 11/16/1970 FOBT Q 1 YEAR AGE 50-75 11/16/1999 ZOSTER VACCINE AGE 60> 9/16/2009 GLAUCOMA SCREENING Q2Y 11/16/2014 Bone Densitometry (Dexa) Screening 11/16/2014 Pneumococcal 65+ Low/Medium Risk (1 of 2 - PCV13) 11/16/2014 MEDICARE YEARLY EXAM 7/28/2018 Influenza Age 5 to Adult 8/1/2018 BREAST CANCER SCRN MAMMOGRAM 4/20/2020 Allergies as of 8/13/2018  Review Complete On: 8/13/2018 By: Nina Osborne MD  
  
 Severity Noted Reaction Type Reactions Ampicillin  06/13/2017    Hives Current Immunizations  Never Reviewed No immunizations on file. Not reviewed this visit You Were Diagnosed With   
  
 Codes Comments Routine general medical examination at a health care facility    -  Primary ICD-10-CM: Z00.00 ICD-9-CM: V70.0 Cerebrovascular accident (CVA) due to embolism of other cerebral artery (HCC)     ICD-10-CM: I63.49 
ICD-9-CM: 434.11 Advance care planning     ICD-10-CM: Z71.89 ICD-9-CM: V65.49 High cholesterol     ICD-10-CM: E78.00 ICD-9-CM: 272.0 Vitals BP Pulse Temp Resp Height(growth percentile) Weight(growth percentile) 123/78 88 98.3 °F (36.8 °C) 20 5' 5\" (1.651 m) 155 lb (70.3 kg) SpO2 BMI OB Status Smoking Status 97% 25.79 kg/m2 Menopause Never Smoker Vitals History BMI and BSA Data Body Mass Index Body Surface Area 25.79 kg/m 2 1.8 m 2 Preferred Pharmacy Pharmacy Name Phone [address] [phone] Your Updated Medication List  
  
   
This list is accurate as of 8/13/18  9:45 AM.  Always use your most recent med list.  
  
  
  
  
 aspirin delayed-release 81 mg tablet Take  by mouth daily. atorvastatin 20 mg tablet Commonly known as:  LIPITOR Take 1 Tab by mouth daily. We Performed the Following CBC WITH AUTOMATED DIFF [17797 CPT(R)] LIPID PANEL [42882 CPT(R)] METABOLIC PANEL, COMPREHENSIVE [53211 CPT(R)] TSH 3RD GENERATION [14636 CPT(R)] Follow-up Instructions Return in about 6 months (around 2/13/2019). Introducing [facility] & HEALTH SERVICES! [employer] introduces "[employer] patient portal. Now you can access parts of your medical record, email your doctor's office, and request medication refills online. 1. In your internet browser, go to [email or web address] 2. Click on the First Time User? Click Here link in the Sign In box. You will see the New Member Sign Up page. 3. Enter your "[employer] Access Code exactly as it appears below. You will not need to use this code after youve completed the sign-up process. If you do not sign up before the expiration date, you must request a new code. · "[employer] Access Code: [email or web address] Expires: 10/21/2018  7:46 AM 
 
4. Enter the last four digits of your Social Security Number (xxxx) and Date of Birth (mm/dd/yyyy) as indicated and click Submit. You will be taken to the next sign-up page. 5. Create a "[employer] ID. This will be your "[employer] login ID and cannot be changed, so think of one that is secure and easy to remember. 6. Create a [employer] password. You can change your password at any time. 7. Enter your Password Reset Question and Answer. This can be used at a later time if you forget your password. 8. Enter your e-mail address. You will receive e-mail notification when new information is available in [address] 9. Click Sign Up. You can now view and download portions of your medical record. 10. Click the Download Summary menu link to download a portable copy of your medical information. If you have questions, please visit the Frequently Asked Questions section of the [employer] website. Remember, [employer] is NOT to be used for urgent needs. For medical emergencies, dial 911. Now available from your iPhone and Android! Please provide this summary of care documentation to your next provider. Your primary care clinician is listed as HARRIETT MARRERO. If you have any questions after today's visit, please call [phone].

## 2018-08-14 LAB
ALBUMIN SERPL-MCNC: 4.6 G/DL (ref 3.6–4.8)
ALBUMIN/GLOB SERPL: 2.7 {RATIO} (ref 1.2–2.2)
ALP SERPL-CCNC: 72 IU/L (ref 39–117)
ALT SERPL-CCNC: 15 IU/L (ref 0–32)
AST SERPL-CCNC: 17 IU/L (ref 0–40)
BASOPHILS # BLD AUTO: 0 X10E3/UL (ref 0–0.2)
BASOPHILS NFR BLD AUTO: 0 %
BILIRUB SERPL-MCNC: 0.8 MG/DL (ref 0–1.2)
BUN SERPL-MCNC: 17 MG/DL (ref 8–27)
BUN/CREAT SERPL: 21 (ref 12–28)
CALCIUM SERPL-MCNC: 9.1 MG/DL (ref 8.7–10.3)
CHLORIDE SERPL-SCNC: 108 MMOL/L (ref 96–106)
CHOLEST SERPL-MCNC: 149 MG/DL (ref 100–199)
CO2 SERPL-SCNC: 23 MMOL/L (ref 20–29)
CREAT SERPL-MCNC: 0.82 MG/DL (ref 0.57–1)
EOSINOPHIL # BLD AUTO: 0.1 X10E3/UL (ref 0–0.4)
EOSINOPHIL NFR BLD AUTO: 1 %
ERYTHROCYTE [DISTWIDTH] IN BLOOD BY AUTOMATED COUNT: 14.4 % (ref 12.3–15.4)
GLOBULIN SER CALC-MCNC: 1.7 G/DL (ref 1.5–4.5)
GLUCOSE SERPL-MCNC: 86 MG/DL (ref 65–99)
HCT VFR BLD AUTO: 42.1 % (ref 34–46.6)
HDLC SERPL-MCNC: 54 MG/DL
HGB BLD-MCNC: 14.1 G/DL (ref 11.1–15.9)
IMM GRANULOCYTES # BLD: 0 X10E3/UL (ref 0–0.1)
IMM GRANULOCYTES NFR BLD: 0 %
INTERPRETATION, 910389: NORMAL
LDLC SERPL CALC-MCNC: 65 MG/DL (ref 0–99)
LYMPHOCYTES # BLD AUTO: 5.7 X10E3/UL (ref 0.7–3.1)
LYMPHOCYTES NFR BLD AUTO: 57 %
MCH RBC QN AUTO: 29.9 PG (ref 26.6–33)
MCHC RBC AUTO-ENTMCNC: 33.5 G/DL (ref 31.5–35.7)
MCV RBC AUTO: 89 FL (ref 79–97)
MONOCYTES # BLD AUTO: 0.5 X10E3/UL (ref 0.1–0.9)
MONOCYTES NFR BLD AUTO: 5 %
NEUTROPHILS # BLD AUTO: 3.7 X10E3/UL (ref 1.4–7)
NEUTROPHILS NFR BLD AUTO: 37 %
PLATELET # BLD AUTO: 245 X10E3/UL (ref 150–379)
POTASSIUM SERPL-SCNC: 4.3 MMOL/L (ref 3.5–5.2)
PROT SERPL-MCNC: 6.3 G/DL (ref 6–8.5)
RBC # BLD AUTO: 4.71 X10E6/UL (ref 3.77–5.28)
SODIUM SERPL-SCNC: 145 MMOL/L (ref 134–144)
TRIGL SERPL-MCNC: 151 MG/DL (ref 0–149)
TSH SERPL DL<=0.005 MIU/L-ACNC: 1.68 UIU/ML (ref 0.45–4.5)
VLDLC SERPL CALC-MCNC: 30 MG/DL (ref 5–40)
WBC # BLD AUTO: 10 X10E3/UL (ref 3.4–10.8)

## 2018-08-14 NOTE — PROGRESS NOTES
A letter was sent to the patient at the address on file, with lab results and Dr. Arin Sequeira recommendations for the patient.

## 2018-08-14 NOTE — PROGRESS NOTES
After reviewing your labs, I believe they are within normal  limits for your age and let us stay with our current plan of action. If you have any questions, feel free to email thru [facility], or give us   a call back. Josias Solorzano M.D.   Good Help to Those in Need  \"You maybe whatever you resolve to be\" Never smoker

## 2018-09-12 ENCOUNTER — HOSPITAL ENCOUNTER (INPATIENT)
Age: 68
LOS: 1 days | Discharge: HOME | DRG: 069 | End: 2018-09-13
Attending: EMERGENCY MEDICINE | Admitting: FAMILY MEDICINE
Payer: MEDICARE

## 2018-09-12 ENCOUNTER — APPOINTMENT (OUTPATIENT)
Dept: MRI IMAGING | Age: 68
DRG: 069 | End: 2018-09-12
Attending: EMERGENCY MEDICINE
Payer: MEDICARE

## 2018-09-12 ENCOUNTER — APPOINTMENT (OUTPATIENT)
Dept: CT IMAGING | Age: 68
DRG: 069 | End: 2018-09-12
Attending: EMERGENCY MEDICINE
Payer: MEDICARE

## 2018-09-12 ENCOUNTER — APPOINTMENT (OUTPATIENT)
Dept: GENERAL RADIOLOGY | Age: 68
DRG: 069 | End: 2018-09-12
Attending: EMERGENCY MEDICINE
Payer: MEDICARE

## 2018-09-12 DIAGNOSIS — H81.10 BENIGN PAROXYSMAL POSITIONAL VERTIGO, UNSPECIFIED LATERALITY: ICD-10-CM

## 2018-09-12 DIAGNOSIS — R40.4 TRANSIENT ALTERATION OF AWARENESS: Primary | ICD-10-CM

## 2018-09-12 PROBLEM — G45.9 TIA (TRANSIENT ISCHEMIC ATTACK): Status: ACTIVE | Noted: 2018-09-12

## 2018-09-12 LAB
ALBUMIN SERPL-MCNC: 3.8 G/DL (ref 3.5–5)
ALBUMIN/GLOB SERPL: 1.2 {RATIO} (ref 1.1–2.2)
ALP SERPL-CCNC: 85 U/L (ref 45–117)
ALT SERPL-CCNC: 25 U/L (ref 12–78)
ANION GAP SERPL CALC-SCNC: 9 MMOL/L (ref 5–15)
APPEARANCE UR: ABNORMAL
AST SERPL-CCNC: 18 U/L (ref 15–37)
ATRIAL RATE: 65 BPM
BACTERIA URNS QL MICRO: ABNORMAL /HPF
BASOPHILS # BLD: 0.1 K/UL (ref 0–0.1)
BASOPHILS NFR BLD: 1 % (ref 0–1)
BILIRUB SERPL-MCNC: 0.7 MG/DL (ref 0.2–1)
BILIRUB UR QL: NEGATIVE
BUN SERPL-MCNC: 19 MG/DL (ref 6–20)
BUN/CREAT SERPL: 20 (ref 12–20)
CALCIUM SERPL-MCNC: 9.2 MG/DL (ref 8.5–10.1)
CALCULATED P AXIS, ECG09: 44 DEGREES
CALCULATED R AXIS, ECG10: 8 DEGREES
CALCULATED T AXIS, ECG11: 18 DEGREES
CHLORIDE SERPL-SCNC: 106 MMOL/L (ref 97–108)
CO2 SERPL-SCNC: 26 MMOL/L (ref 21–32)
COLOR UR: ABNORMAL
COMMENT, HOLDF: NORMAL
CREAT SERPL-MCNC: 0.96 MG/DL (ref 0.55–1.02)
DIAGNOSIS, 93000: NORMAL
DIFFERENTIAL METHOD BLD: ABNORMAL
EOSINOPHIL # BLD: 0.1 K/UL (ref 0–0.4)
EOSINOPHIL NFR BLD: 1 % (ref 0–7)
EPITH CASTS URNS QL MICRO: ABNORMAL /LPF
ERYTHROCYTE [DISTWIDTH] IN BLOOD BY AUTOMATED COUNT: 12.2 % (ref 11.5–14.5)
EST. AVERAGE GLUCOSE BLD GHB EST-MCNC: 117 MG/DL
GLOBULIN SER CALC-MCNC: 3.1 G/DL (ref 2–4)
GLUCOSE BLD STRIP.AUTO-MCNC: 105 MG/DL (ref 65–100)
GLUCOSE SERPL-MCNC: 100 MG/DL (ref 65–100)
GLUCOSE UR STRIP.AUTO-MCNC: NEGATIVE MG/DL
HBA1C MFR BLD: 5.7 % (ref 4.2–6.3)
HCT VFR BLD AUTO: 40.9 % (ref 35–47)
HGB BLD-MCNC: 13.8 G/DL (ref 11.5–16)
HGB UR QL STRIP: NEGATIVE
HYALINE CASTS URNS QL MICRO: ABNORMAL /LPF (ref 0–5)
IMM GRANULOCYTES # BLD: 0 K/UL (ref 0–0.04)
IMM GRANULOCYTES NFR BLD AUTO: 0 % (ref 0–0.5)
INR PPP: 1 (ref 0.9–1.1)
KETONES UR QL STRIP.AUTO: NEGATIVE MG/DL
LEUKOCYTE ESTERASE UR QL STRIP.AUTO: NEGATIVE
LYMPHOCYTES # BLD: 5.6 K/UL (ref 0.8–3.5)
LYMPHOCYTES NFR BLD: 44 % (ref 12–49)
MAGNESIUM SERPL-MCNC: 2.3 MG/DL (ref 1.6–2.4)
MCH RBC QN AUTO: 29.2 PG (ref 26–34)
MCHC RBC AUTO-ENTMCNC: 33.7 G/DL (ref 30–36.5)
MCV RBC AUTO: 86.7 FL (ref 80–99)
MONOCYTES # BLD: 0.6 K/UL (ref 0–1)
MONOCYTES NFR BLD: 5 % (ref 5–13)
NEUTS SEG # BLD: 6.4 K/UL (ref 1.8–8)
NEUTS SEG NFR BLD: 50 % (ref 32–75)
NITRITE UR QL STRIP.AUTO: NEGATIVE
NRBC # BLD: 0 K/UL (ref 0–0.01)
NRBC BLD-RTO: 0 PER 100 WBC
P-R INTERVAL, ECG05: 130 MS
PH UR STRIP: 7 [PH] (ref 5–8)
PHOSPHATE SERPL-MCNC: 3 MG/DL (ref 2.6–4.7)
PLATELET # BLD AUTO: 270 K/UL (ref 150–400)
PMV BLD AUTO: 9.4 FL (ref 8.9–12.9)
POTASSIUM SERPL-SCNC: 4.2 MMOL/L (ref 3.5–5.1)
PROT SERPL-MCNC: 6.9 G/DL (ref 6.4–8.2)
PROT UR STRIP-MCNC: NEGATIVE MG/DL
PROTHROMBIN TIME: 10.1 SEC (ref 9–11.1)
Q-T INTERVAL, ECG07: 426 MS
QRS DURATION, ECG06: 82 MS
QTC CALCULATION (BEZET), ECG08: 443 MS
RBC # BLD AUTO: 4.72 M/UL (ref 3.8–5.2)
RBC #/AREA URNS HPF: ABNORMAL /HPF (ref 0–5)
SAMPLES BEING HELD,HOLD: NORMAL
SERVICE CMNT-IMP: ABNORMAL
SODIUM SERPL-SCNC: 141 MMOL/L (ref 136–145)
SP GR UR REFRACTOMETRY: 1.01 (ref 1–1.03)
TROPONIN I SERPL-MCNC: <0.05 NG/ML
TROPONIN I SERPL-MCNC: <0.05 NG/ML
TSH SERPL DL<=0.05 MIU/L-ACNC: 1.02 UIU/ML (ref 0.36–3.74)
UA: UC IF INDICATED,UAUC: ABNORMAL
UROBILINOGEN UR QL STRIP.AUTO: 0.2 EU/DL (ref 0.2–1)
VENTRICULAR RATE, ECG03: 65 BPM
WBC # BLD AUTO: 12.8 K/UL (ref 3.6–11)
WBC URNS QL MICRO: ABNORMAL /HPF (ref 0–4)

## 2018-09-12 PROCEDURE — 85610 PROTHROMBIN TIME: CPT | Performed by: FAMILY MEDICINE

## 2018-09-12 PROCEDURE — 65660000000 HC RM CCU STEPDOWN

## 2018-09-12 PROCEDURE — 70544 MR ANGIOGRAPHY HEAD W/O DYE: CPT

## 2018-09-12 PROCEDURE — 84100 ASSAY OF PHOSPHORUS: CPT | Performed by: FAMILY MEDICINE

## 2018-09-12 PROCEDURE — 87186 SC STD MICRODIL/AGAR DIL: CPT

## 2018-09-12 PROCEDURE — 70547 MR ANGIOGRAPHY NECK W/O DYE: CPT

## 2018-09-12 PROCEDURE — 99285 EMERGENCY DEPT VISIT HI MDM: CPT

## 2018-09-12 PROCEDURE — 85025 COMPLETE CBC W/AUTO DIFF WBC: CPT | Performed by: EMERGENCY MEDICINE

## 2018-09-12 PROCEDURE — 93005 ELECTROCARDIOGRAM TRACING: CPT

## 2018-09-12 PROCEDURE — 70450 CT HEAD/BRAIN W/O DYE: CPT

## 2018-09-12 PROCEDURE — 87086 URINE CULTURE/COLONY COUNT: CPT

## 2018-09-12 PROCEDURE — 70551 MRI BRAIN STEM W/O DYE: CPT

## 2018-09-12 PROCEDURE — 74011250637 HC RX REV CODE- 250/637: Performed by: EMERGENCY MEDICINE

## 2018-09-12 PROCEDURE — 71045 X-RAY EXAM CHEST 1 VIEW: CPT

## 2018-09-12 PROCEDURE — 84484 ASSAY OF TROPONIN QUANT: CPT | Performed by: EMERGENCY MEDICINE

## 2018-09-12 PROCEDURE — 81001 URINALYSIS AUTO W/SCOPE: CPT

## 2018-09-12 PROCEDURE — 74011250637 HC RX REV CODE- 250/637: Performed by: FAMILY MEDICINE

## 2018-09-12 PROCEDURE — 83036 HEMOGLOBIN GLYCOSYLATED A1C: CPT | Performed by: FAMILY MEDICINE

## 2018-09-12 PROCEDURE — 74011250636 HC RX REV CODE- 250/636: Performed by: EMERGENCY MEDICINE

## 2018-09-12 PROCEDURE — 80307 DRUG TEST PRSMV CHEM ANLYZR: CPT | Performed by: FAMILY MEDICINE

## 2018-09-12 PROCEDURE — 84443 ASSAY THYROID STIM HORMONE: CPT | Performed by: FAMILY MEDICINE

## 2018-09-12 PROCEDURE — 36415 COLL VENOUS BLD VENIPUNCTURE: CPT

## 2018-09-12 PROCEDURE — 83735 ASSAY OF MAGNESIUM: CPT | Performed by: FAMILY MEDICINE

## 2018-09-12 PROCEDURE — 87077 CULTURE AEROBIC IDENTIFY: CPT

## 2018-09-12 PROCEDURE — 80053 COMPREHEN METABOLIC PANEL: CPT | Performed by: EMERGENCY MEDICINE

## 2018-09-12 PROCEDURE — 82962 GLUCOSE BLOOD TEST: CPT

## 2018-09-12 RX ORDER — ACETAMINOPHEN 500 MG
1000 TABLET ORAL
Status: COMPLETED | OUTPATIENT
Start: 2018-09-12 | End: 2018-09-12

## 2018-09-12 RX ORDER — ENOXAPARIN SODIUM 100 MG/ML
40 INJECTION SUBCUTANEOUS EVERY 24 HOURS
Status: DISCONTINUED | OUTPATIENT
Start: 2018-09-12 | End: 2018-09-13 | Stop reason: HOSPADM

## 2018-09-12 RX ORDER — SODIUM CHLORIDE 0.9 % (FLUSH) 0.9 %
5-10 SYRINGE (ML) INJECTION AS NEEDED
Status: DISCONTINUED | OUTPATIENT
Start: 2018-09-12 | End: 2018-09-13 | Stop reason: HOSPADM

## 2018-09-12 RX ORDER — ATORVASTATIN CALCIUM 20 MG/1
20 TABLET, FILM COATED ORAL
COMMUNITY
End: 2018-09-13

## 2018-09-12 RX ORDER — SODIUM CHLORIDE 0.9 % (FLUSH) 0.9 %
5-10 SYRINGE (ML) INJECTION EVERY 8 HOURS
Status: DISCONTINUED | OUTPATIENT
Start: 2018-09-12 | End: 2018-09-13 | Stop reason: HOSPADM

## 2018-09-12 RX ORDER — ATORVASTATIN CALCIUM 20 MG/1
40 TABLET, FILM COATED ORAL
Status: DISCONTINUED | OUTPATIENT
Start: 2018-09-12 | End: 2018-09-13 | Stop reason: HOSPADM

## 2018-09-12 RX ORDER — ACETAMINOPHEN 325 MG/1
650 TABLET ORAL
Status: DISCONTINUED | OUTPATIENT
Start: 2018-09-12 | End: 2018-09-13 | Stop reason: HOSPADM

## 2018-09-12 RX ORDER — MECLIZINE HYDROCHLORIDE 25 MG/1
50 TABLET ORAL
Status: COMPLETED | OUTPATIENT
Start: 2018-09-12 | End: 2018-09-12

## 2018-09-12 RX ORDER — ACETAMINOPHEN 650 MG/1
650 SUPPOSITORY RECTAL
Status: DISCONTINUED | OUTPATIENT
Start: 2018-09-12 | End: 2018-09-13 | Stop reason: HOSPADM

## 2018-09-12 RX ORDER — CEPHALEXIN 250 MG/1
500 CAPSULE ORAL 2 TIMES DAILY
Status: DISCONTINUED | OUTPATIENT
Start: 2018-09-13 | End: 2018-09-13 | Stop reason: HOSPADM

## 2018-09-12 RX ORDER — GUAIFENESIN 100 MG/5ML
81 LIQUID (ML) ORAL DAILY
Status: DISCONTINUED | OUTPATIENT
Start: 2018-09-13 | End: 2018-09-13 | Stop reason: HOSPADM

## 2018-09-12 RX ADMIN — ACETAMINOPHEN 1000 MG: 500 TABLET ORAL at 14:38

## 2018-09-12 RX ADMIN — Medication 10 ML: at 21:56

## 2018-09-12 RX ADMIN — MECLIZINE HYDROCHLORIDE 50 MG: 25 TABLET ORAL at 14:38

## 2018-09-12 RX ADMIN — ATORVASTATIN CALCIUM 40 MG: 20 TABLET, FILM COATED ORAL at 21:56

## 2018-09-12 NOTE — ED NOTES
TRANSFER - OUT REPORT:    Verbal report given to Mervat (name) on Zee Matt  being transferred to 5th floor - remote tele - room 506 (unit) for routine progression of care       Report consisted of patients Situation, Background, Assessment and   Recommendations(SBAR). Information from the following report(s) SBAR, ED Summary, [facility] and Recent Results was reviewed with the receiving nurse. Lines:   Peripheral IV 09/12/18 Left Arm (Active)   Site Assessment Clean, dry, & intact 9/12/2018 12:49 PM   Phlebitis Assessment 0 9/12/2018 12:49 PM   Infiltration Assessment 0 9/12/2018 12:49 PM   Dressing Status Clean, dry, & intact 9/12/2018 12:49 PM   Dressing Type Tape;Transparent 9/12/2018 12:49 PM   Hub Color/Line Status Pink 9/12/2018 12:49 PM        Opportunity for questions and clarification was provided.       Patient transported with:   [employer]

## 2018-09-12 NOTE — IP AVS SNAPSHOT
[address] [place] [address] 
[phone] Patient: Marcos Castro MRN: [id number] GARY:[id number] About your hospitalization You were admitted on:  September 12, 2018 You last received care in the:  [facility] 5M1 MED SURG 1 You were discharged on:  September 13, 2018 Why you were hospitalized Your primary diagnosis was:  Tia (Transient Ischemic Attack) Your diagnoses also included:  Vertigo, High Cholesterol Follow-up Information Follow up With Details Comments Contact Info Anne Dallas MD Go on 9/18/2018 Appointment at 1:15 please arrive 15 minutes early  [address] [address] 
[phone] Stiven Malcolm MD In 2 weeks  [address] [facility] 99 [address] 
[phone] Your Scheduled Appointments Tuesday September 18, 2018  1:15 PM EDT TRANSITIONAL CARE MANAGEMENT with Anne Dallas MD  
[address] [facility] [address] [place] [address] [id number] [place] [address] Road [address]  
[phone] Discharge Orders None A check prashant indicates which time of day the medication should be taken. My Medications START taking these medications Instructions Each Dose to Equal  
 Morning Noon Evening Bedtime  
 aspirin 325 mg tablet Commonly known as:  ASPIRIN Replaces:  aspirin delayed-release 81 mg tablet Your last dose was: Your next dose is: Take 1 Tab by mouth daily. 325 mg  
    
   
   
   
  
 cephALEXin 500 mg capsule Commonly known as:  Melvin Sy Your last dose was: Your next dose is: Take 1 Cap by mouth two (2) times a day for 4 days. For treatment of UTI. Take from 9/13/2018 starting in the evening, then continue twice a day until 9/19/2018  
 500 mg CHANGE how you take these medications  Instructions Each Dose to Equal  
 Morning Noon Evening Bedtime  
 atorvastatin 40 mg tablet Commonly known as:  LIPITOR What changed:   
- medication strength 
- how much to take - when to take this Your last dose was: Your next dose is: Take 1 Tab by mouth daily. 40 mg  
    
   
   
   
  
  
STOP taking these medications   
 aspirin delayed-release 81 mg tablet Replaced by:  aspirin 325 mg tablet Where to Get Your Medications Information on where to get these meds will be given to you by the nurse or doctor. ! Ask your nurse or doctor about these medications  
  aspirin 325 mg tablet  
 atorvastatin 40 mg tablet  
 cephALEXin 500 mg capsule Discharge Instructions HOME DISCHARGE INSTRUCTIONS Carson Mcarthur / [id number] : 1950 Admission date: 2018 Discharge date: 2018 Please bring this form with you to show your care provider at your follow-up appointment. Primary care provider:  Tayler Krishnan MD 
 
Discharging provider:  Lori Dailey MD  - Family Medicine Resident Dr. Rocio Joseph  - Attending, Family Medicine You have been admitted to the hospital with the following diagnoses: 
 
ACUTE DIAGNOSES: 
TIA (transient ischemic attack) Kacy Emerson . . . . . . . . . . . . . . . . . . . . . . . . . . . . . . . . . . . . . . . . . . . . . . . . . . . . . . . . . . . . . . . . . . . . . . .  
 
Continue ALL home medications as previously prescribed FOLLOW-UP CARE RECOMMENDATIONS: 
 
Appointments Follow-up Information Follow up With Details Comments Contact Info Tayler Krishnan MD Go on 2018 Appointment at 1:15 please arrive 15 minutes early  [address] [place] [address] [place] [address] 
[phone] Jason Romero MD In 2 weeks  [address] [facility] [address] [facility] 99 [address] 
[phone] Medication Changes:  
 
1.  START Taking 1 pill of Keflex 500 mg 2 times a day (one in the morning and one in the evening) starting on the evening of 9/13/2018 until the morning of 9/19/2018. 
2. INCREASE YOUR ASPIRIN TO 325mg (1 TAB DAILY) 3. INCREASE YOUR LIPITOR TO 40mg (1 TAB DAILY) 4. CONTINUE ALL OTHER MEDICATIONS AS PREVIOUSLY PRESCRIBED. Follow-up tests needed: None Pending test results: At the time of your discharge the following test results are still pending: Urine Culture. Please make sure you review these results with your outpatient follow-up provider(s). Specific symptoms to watch for: chest pain, shortness of breath, fever, chills, nausea, vomiting, diarrhea, change in mentation, falling, weakness, bleeding. DIET/what to eat:  Regular Diet ACTIVITY:  Activity as tolerated Wound care: none Equipment needed:  none What to do if new or unexpected symptoms occur? If you experience any of the above symptoms (or should other concerns or questions arise after discharge) please call your primary care physician. Return to the emergency room if you cannot get hold of your doctor. · It is very important that you keep your follow-up appointment(s). · Please bring discharge papers, medication list (and/or medication bottles) to your follow-up appointments for review by your outpatient provider(s). · Please check the list of medications and be sure it includes every medication (even non-prescription medications) that your provider wants you to take. · It is important that you take the medication exactly as they are prescribed. · Keep your medication in the bottles provided by the pharmacist and keep a list of the medication names, dosages, and times to be taken in your wallet. · Do not take other medications without consulting your doctor. · If you have any questions about your medications or other instructions, please talk to your nurse or care provider before you leave the hospital.  
 
Information obtained by: I understand that if any problems occur once I am at home I am to contact my physician. These instructions were explained to me and I had the opportunity to ask questions. I understand and acknowledge receipt of the instructions indicated above. Physician's or R.N.'s Signature                                                                  Date/Time Patient or Representative Signature                                                          Date/Time 
 
 
 
 
ACO Transitions of Care Introducing [employer] [address] Melissa Esqueda offers a voluntary care coordination program to provide high quality service and care to [facility] fee-for-service beneficiaries. Rishi Smart was designed to help you enhance your health and well-being through the following services: ? Transitions of Care  support for individuals who are transitioning from one care setting to another (example: Hospital to home). ? Chronic and Complex Care Coordination  support for individuals and caregivers of those with serious or chronic illnesses or with more than one chronic (ongoing) condition and those who take a number of different medications. If you meet specific medical criteria, a [address] [place] [address] Rd may call you directly to coordinate your care with your primary care physician and your other care providers. For questions about the [facility] programs, please, contact your physicians office. For general questions or additional information about Accountable Care Organizations: 
Please visit www.medicare.gov/acos. html or call 1-800-MEDICARE (3-779.173.3566) Tabula users should call 5-975.924.3997. MarketBridge Announcement We are excited to announce that we are making your provider's discharge notes available to you in MarketBridge. You will see these notes when they are completed and signed by the physician that discharged you from your recent hospital stay. If you have any questions or concerns about any information you see in MarketBridge, please call the Health Information Department where you were seen or reach out to your Primary Care Provider for more information about your plan of care. Introducing [facility]! [facility] introduces MarketBridge patient portal. Now you can access parts of your medical record, email your doctor's office, and request medication refills online. 1. In your internet browser, go to [email or web address] 2. Click on the First Time User? Click Here link in the Sign In box. You will see the New Member Sign Up page. 3. Enter your MarketBridge Access Code exactly as it appears below. You will not need to use this code after youve completed the sign-up process. If you do not sign up before the expiration date, you must request a new code. · MarketBridge Access Code: [email or web address] Expires: 10/21/2018  7:46 AM 
 
4. Enter the last four digits of your Social Security Number (xxxx) and Date of Birth (mm/dd/yyyy) as indicated and click Submit. You will be taken to the next sign-up page. 5. Create a MarketBridge ID. This will be your MarketBridge login ID and cannot be changed, so think of one that is secure and easy to remember. 6. Create a MarketBridge password. You can change your password at any time. 7. Enter your Password Reset Question and Answer. This can be used at a later time if you forget your password. 8. Enter your e-mail address. You will receive e-mail notification when new information is available in [address] 9. Click Sign Up. You can now view and download portions of your medical record. 10. Click the Download Summary menu link to download a portable copy of your medical information. If you have questions, please visit the Frequently Asked Questions section of the [employer] website. Remember, [employer] is NOT to be used for urgent needs. For medical emergencies, dial 911. Now available from your iPhone and Android! Introducing Howard Grayson As a [facility] patient, I wanted to make you aware of our electronic visit tool called Howard Grayson. [employer]apidMind allows you to connect within minutes with a medical provider 24 hours a day, seven days a week via a mobile device or tablet or logging into a secure website from your computer. You can access Howard Grayson from anywhere in the [place]. A virtual visit might be right for you when you have a simple condition and feel like you just dont want to get out of bed, or cant get away from work for an appointment, when your regular [facility] provider is not available (evenings, weekends or holidays), or when youre out of town and need minor care. Electronic visits cost only $49 and if the [employer]apid[employer] provider determines a prescription is needed to treat your condition, one can be electronically transmitted to a nearby pharmacy*. Please take a moment to enroll today if you have not already done so. The enrollment process is free and takes just a few minutes. To enroll, please download the eCareDiary mike to your tablet or phone, or visit [email or web address] to enroll on your computer. And, as an [address] [place] [address] patient with a Sqrl account, the results of your visits will be scanned into your electronic medical record and your primary care provider will be able to view the scanned results. We urge you to continue to see your regular [facility] provider for your ongoing medical care.   And while your primary care provider may not be the one available when you seek a Howard Medinascott virtual visit, the peace of mind you get from getting a real diagnosis real time can be priceless. For more information on Howard Medinaclairefin, view our Frequently Asked Questions (FAQs) at [email or web address]. Sincerely, 
 
Boby Nguyen MD 
Chief Medical Officer Kelsi Esqueda *:  certain medications cannot be prescribed via Howard Medinascott Unresulted Labs-Please follow up with your PCP about these lab tests Order Current Status CULTURE, URINE In process Providers Seen During Your Hospitalization Provider Specialty Primary office phone Joslyn Fan MD Emergency Medicine [phone] Terri Hodges MD Family Practice [phone] Your Primary Care Physician (PCP) Primary Care Physician Office Phone Office Fax [phone] Richard Estrada [phone] You are allergic to the following Allergen Reactions Ampicillin Hives Bactrim (Sulfamethoprim) Hives Recent Documentation Height Weight BMI OB Status Smoking Status 1.651 m 72.4 kg 26.56 kg/m2 Menopause Never Smoker Emergency Contacts Name Discharge Info Relation Home Work Mobile [address] [facility] CAREGIVER [3] Child [2] [phone] Patient Belongings The following personal items are in your possession at time of discharge: 
  Dental Appliances: None  Visual Aid: Glasses      Home Medications: None   Jewelry: With patient  Clothing: At bedside    Other Valuables: Cell Phone Please provide this summary of care documentation to your next provider. Signatures-by signing, you are acknowledging that this After Visit Summary has been reviewed with you and you have received a copy. Patient Signature:  ____________________________________________________________  Date:  ____________________________________________________________  
  
[facility]    
 Provider Signature:  ____________________________________________________________ Date:  ____________________________________________________________

## 2018-09-12 NOTE — PROGRESS NOTES
Admission Medication Reconciliation:    Comments/Recommendations:  -Medication history obtained in the emergency department (room 01)  -Patient knowledgeable of medications and last doses, also clarified allergy history  -Patient also takes valacyclovir 500 mg as needed for cold sores, but states she hasn't taken any in over 2 weeks    Medications added: None  Medications removed: None  Medications changed: Changed last dose of aspirin and atorvastatin to last night (takes both at bedtime)    Information obtained from: Patient and RxQuery    Significant PMH/Disease States:   Past Medical History:   Diagnosis Date    Cancer ([facility] [address]     Stroke [facility])        Chief Complaint for this Admission:    Chief Complaint   Patient presents with    Chest Pain     Generalized weakness       Allergies:  Ampicillin and Bactrim [sulfamethoprim]    Prior to Admission Medications:   Prior to Admission Medications   Prescriptions Last Dose Informant Patient Reported? Taking?   aspirin delayed-release 81 mg tablet 9/11/2018 at bedtime  Yes Yes   Sig: Take 81 mg by mouth nightly. atorvastatin (LIPITOR) 20 mg tablet 9/11/2018 at bedtime  Yes Yes   Sig: Take 20 mg by mouth nightly.       Facility-Administered Medications: None       Thank you,  John Russ, PHARMD

## 2018-09-12 NOTE — PROGRESS NOTES
Spiritual Care Assessment/Progress Note  [address]      NAME: Santos Juárez      MRN: [id number]  AGE: 75 y.o. SEX: female  Latter day Affiliation:    Language: English     9/12/2018     Total Time (in minutes): 6     Spiritual Assessment begun in [facility] EMERGENCY DEPT through conversation with:         []Patient        [] Family    [] Friend(s)        Reason for Consult: Other (comment), Emergency Department visit (Code S)     Spiritual beliefs: (Please include comment if needed)     [] Identifies with a sunil tradition:         [] Supported by a sunil community:            [] Claims no spiritual orientation:           [] Seeking spiritual identity:                [] Adheres to an individual form of spirituality:           [x] Not able to assess:                           Identified resources for coping:      [] Prayer                               [] Music                  [] Guided Imagery     [] Family/friends                 [] Pet visits     [] Devotional reading                         [] Unknown     [] Other:                                              Interventions offered during this visit: (See comments for more details)    Patient Interventions: Initial visit (Staff working with PT)           Plan of Care:     [] Support spiritual and/or cultural needs    [] Support AMD and/or advance care planning process      [] Support grieving process   [] Coordinate Rites and/or Rituals    [] Coordination with community clergy   [] No spiritual needs identified at this time   [] Detailed Plan of Care below (See Comments)  [] Make referral to Music Therapy  [] Make referral to Pet Therapy     [] Make referral to Addiction services  [] Make referral to [facility]  [] Make referral to Spiritual Care Partner  [] No future visits requested        [x] Follow up visits as needed     Comments: responded to Code S in ER. Staff was working with Hever Bhat, Tele Doctor also began to meet with her.   Advised staff of  availability,  Chaplains will follow as able and/or needed.   Visited by: Amador Ring [address] ([address])

## 2018-09-12 NOTE — PROGRESS NOTES
9/12/2018  2:32 PM  Case management note    Met with patient to discuss planning. Patient lives in home with s/o and only a few steps to enter. She is independent with no equipment. Patient uses [facility] on [address] Clara Verma for medical management. NN notified. Reason for Admission:   dizzness                   RRAT Score:          8           Plan for utilizing home health: Would benefit if deemed neuro                    Likelihood of Readmission:  Low/green                         Transition of Care Plan:              Home with family assistance, possible home health    Care Management Interventions  PCP Verified by CM:  Yes (dr. Vanita quezada NN notified)  Mode of Transport at Discharge: Self  Transition of Care Consult (CM Consult): Discharge Planning  Current Support Network: Lives with Spouse  Confirm Follow Up Transport: Family  Plan discussed with Pt/Family/Caregiver: Yes  Discharge Location  Discharge Placement: Unable to determine at this time  Marya Santos

## 2018-09-12 NOTE — ED PROVIDER NOTES
HPI Comments: 75 y.o. female with past medical history significant for TIA and cancer who presents to the ED with chief complaint of right sided weakness. Pt reports she was walking to the mailbox this morning at approximately 1030 when she began feeling lightheaded and developed a headache and chest pain. Pt states she then began having right sided weakness and tingling in her right hand. Pt states her significant other was \"concerned she may be having a heart attack\" so he brought her to the ED for evaluation. Pt states she has hx of TIA about 3 years ago. There are no other acute medical complaints voiced at this time. Social Hx: Never smoker. Denies EtOH or drug use. PCP: Chapincito Villasenor MD    Note written by [place] iLsa Dickens, as dictated by Nadiya Hawley MD 12:25 PM     The history is provided by the patient. Past Medical History:   Diagnosis Date    Cancer ([facility] [address]     [facility]roke [facility])        Past Surgical History:   Procedure Laterality Date    BREAST SURGERY PROCEDURE UNLISTED      HX GYN           No family history on file. Social History     Social History    Marital status:      Spouse name: N/A    Number of children: N/A    Years of education: N/A     Occupational History    Not on file. Social History Main Topics    Smoking status: Never Smoker    Smokeless tobacco: Never Used    Alcohol use No    Drug use: No    Sexual activity: Not Currently     Other Topics Concern    Not on file     Social History Narrative         ALLERGIES: Ampicillin    Review of Systems   Constitutional: Negative for chills and fever. Respiratory: Negative for cough and shortness of breath. Cardiovascular: Positive for chest pain. Gastrointestinal: Negative for diarrhea and vomiting. Musculoskeletal: Negative for back pain and neck pain. Neurological: Positive for weakness (right side), light-headedness, numbness (right hand) and headaches.  Negative for facial asymmetry and speech difficulty. All other systems reviewed and are negative. Vitals:    09/12/18 1243   BP: 151/79   Pulse: 65   Resp: 17   Temp: 97.7 °F (36.5 °C)   SpO2: 96%   Weight: 72.4 kg (159 lb 9.8 oz)   Height: 5' 5\" (1.651 m)            Physical Exam   Constitutional: She appears well-developed and well-nourished. No distress. HENT:   Head: Normocephalic and atraumatic. Eyes: Conjunctivae are normal.   Neck: Neck supple. Cardiovascular: Normal rate and regular rhythm. Pulmonary/Chest: Effort normal. No stridor. No respiratory distress. Abdominal: She exhibits no distension. Musculoskeletal: Normal range of motion. Neurological: She is alert. Coordination normal.   Limited participation in lower extremity strength exam.   Volitional right hand  weakness that is distractible. No pronator drift. No difficulty with coordination. No facial asymmetry. No cranial nerve deficits. Skin: Skin is warm and dry. Psychiatric:   Anxious. Nursing note and vitals reviewed. Note written by Lisa Baac, as dictated by Dashawn Linares MD 12:26 PM    MDM    75 y.o. female presents with feeling of dizziness and disorientation today while walking with right sided weakness and tingling sensation. Activated as code stroke but not tpa candidate. Resolution of symptoms here except feels slightly dizzy. Neurology recommended admission for completion of workup. Has history of remote panic attack and appears she may have had panic and disorientation with onset of vertigo. Family medicine was consulted for admission and will see the patient in the emergency department. ED Course       Procedures      ED EKG interpretation:  Rhythm: normal sinus rhythm; and regular . Rate (approx.): 65; Axis: normal; ST/T wave: normal; No STEMI.     Note written by Lisa Baca, as dictated by Dashawn Linares MD 12:45 PM    CONSULT NOTE:  12:50 PM Dashawn Linares MD spoke with Dr. Chris Chandra, Consult for Teleneurology. Discussed available diagnostic tests and clinical findings. Dr. Brent Harper will see pt. CONSULT NOTE:  1:24 PM Joslyn Fan MD spoke with Dr. Brent Harper, Consult for Teleneurology. Discussed available diagnostic tests and clinical findings. Dr. Brent Harper says pt is complaining of vertigo and tingling on her right side and felt \"out of it. \" Says he is not sure if pt may have had a small seizure. Recommends admitting pt for an MRI of her brain and additional workup. PROGRESS NOTE:  3:20 PM   Re-examined pt and she is back to baseline with full strength and sensation of all extremities. CONSULT NOTE:  3:35 PM Joslyn Fan MD spoke with resident, Consult for [facility] Medicine. Discussed available diagnostic tests and clinical findings. Family Medicine will admit pt.

## 2018-09-12 NOTE — H&P
[id number]   Admission H&P    Date of admission: 9/12/2018    Patient name: Jeniffer Alanis  MRN: [id number]  YOB: 1950  Age: 75 y.o. Primary care provider:  Carmen Jennings MD     Source of Information: patient, medical records    Chief complaint:  Dizziness/lightheadedness    History of Present Illness  Jeniffer Alanis is a 75 y.o. female with dyslipidemia and Hx of TIA (3 years ago) who presents to the ER complaining of lightheadedness and dizziness that started this afternoon and resolved shortly after arrival in the ED. Pt endorsed HA, blurred vision, RUE and RLE weakness and paresthesias along with vertigo. Symptoms started this afternoon while she was working in her garage and lasted for about 2 hours. She was helped to the car by her friend and was brought to the ER. Friend denied slurred speech and facial droop. All of her symptoms resolved after she arrived in the ED and after she was treated with Tylenol and meclizine. Pt states that she had similar symptoms 3 years ago when she was diagnosed with TIA. At the time of my examination: Pt denies HA, weakness, dizziness/lightheadedness, blurred vision, SOB, CP, Abd pain, N/V/D and dysuria. In the ER, vital signs were unremarkable: BP: 143/73. CBC, CMP were unremarkable. CT head: neg for acute intracranial abnormality. CXR: was neg for PNA and pulmonary edema. Pt was treated with tylenol and meclizine in the ED. Home Medications   Prior to Admission medications    Medication Sig Start Date End Date Taking? Authorizing Provider   atorvastatin (LIPITOR) 20 mg tablet Take 20 mg by mouth nightly. Yes Historical Provider   aspirin delayed-release 81 mg tablet Take 81 mg by mouth nightly.    Yes Historical Provider       Allergies   Allergies   Allergen Reactions    Ampicillin Hives    Bactrim [Sulfamethoprim] Hives       Past Medical History:   Diagnosis Date    Cancer ([facility] [address]     Stroke [facility])        Past Surgical History:   Procedure Laterality Date    BREAST SURGERY PROCEDURE UNLISTED      HX GYN         No family history on file. Social History   Patient resides  X  Independently      With family care      Assisted living      SNF      Ambulates  X  Independently      With cane       Assisted walker           Alcohol history   X  None     Social     Chronic     Smoking history  X  None     Former smoker     Current smoker     History   Smoking Status    Never Smoker   Smokeless Tobacco    Never Used     Code status  X  Full code     DNR/DNI     Partial    Code status discussed with the patient/caregivers. Review of Systems  See HPI    Physical Exam  Visit Vitals    /73    Pulse 72    Temp 97.7 °F (36.5 °C)    Resp 23    Ht 5' 5\" (1.651 m)    Wt 159 lb 9.8 oz (72.4 kg)    SpO2 96%    BMI 26.56 kg/m2        General: No acute distress. Alert. Cooperative. No slurred speech and no facial asymmetry. Head: Normocephalic. Atraumatic. Eyes:  Conjunctiva pink. Sclera white. PERRL. Ears:  Ear canals patent. TM non-erythematous. Nose:  Septum midline. Mucosa pink. No drainage. Throat: Mucosa pink. Moist mucous membranes. Neck: Supple. Normal ROM. No stiffness. Respiratory: CTAB. No w/r/r/c.   Cardiovascular: RRR. Normal S1,S2. No m/r/g. Pulses 2+ throughout. GI: + bowel sounds. Nontender. No rebound tenderness or guarding. Nondistended. Extremities: No edema. No palpable cord. No tenderness. Musculoskeletal: Full ROM in all extremities. Neuro: CN II-XII grossly intact. No focal neurological deficits. Strength 5/5 in all extremities. Sensation intact in all extremities. DTRs 2+ throughout. No pronator drift. Skin: Clear. No rashes. No ulcers.     : Deferred   Rectal: Deferred       Laboratory Data  Recent Results (from the past 24 hour(s))   GLUCOSE, POC    Collection Time: 09/12/18 12:44 PM   Result Value Ref Range    Glucose (POC) 105 (H) 65 - 100 mg/dL Performed by Jake Moritz    EKG, 12 LEAD, INITIAL    Collection Time: 09/12/18 12:45 PM   Result Value Ref Range    Ventricular Rate 65 BPM    Atrial Rate 65 BPM    P-R Interval 130 ms    QRS Duration 82 ms    Q-T Interval 426 ms    QTC Calculation (Bezet) 443 ms    Calculated P Axis 44 degrees    Calculated R Axis 8 degrees    Calculated T Axis 18 degrees    Diagnosis       Normal sinus rhythm  Normal ECG  No previous ECGs available  Confirmed by Chet Restrepo M.D., Aga Heard ([id number]) on 9/12/2018 2:54:36 PM     SAMPLES BEING HELD    Collection Time: 09/12/18 12:53 PM   Result Value Ref Range    SAMPLES BEING HELD 1PST,1LAV,1BLU,1GRN,1SST     COMMENT        Add-on orders for these samples will be processed based on acceptable specimen integrity and analyte stability, which may vary by analyte. METABOLIC PANEL, COMPREHENSIVE    Collection Time: 09/12/18 12:53 PM   Result Value Ref Range    Sodium 141 136 - 145 mmol/L    Potassium 4.2 3.5 - 5.1 mmol/L    Chloride 106 97 - 108 mmol/L    CO2 26 21 - 32 mmol/L    Anion gap 9 5 - 15 mmol/L    Glucose 100 65 - 100 mg/dL    BUN 19 6 - 20 MG/DL    Creatinine 0.96 0.55 - 1.02 MG/DL    BUN/Creatinine ratio 20 12 - 20      GFR est AA >60 >60 ml/min/1.73m2    GFR est non-AA 58 (L) >60 ml/min/1.73m2    Calcium 9.2 8.5 - 10.1 MG/DL    Bilirubin, total 0.7 0.2 - 1.0 MG/DL    ALT (SGPT) 25 12 - 78 U/L    AST (SGOT) 18 15 - 37 U/L    Alk.  phosphatase 85 45 - 117 U/L    Protein, total 6.9 6.4 - 8.2 g/dL    Albumin 3.8 3.5 - 5.0 g/dL    Globulin 3.1 2.0 - 4.0 g/dL    A-G Ratio 1.2 1.1 - 2.2     TROPONIN I    Collection Time: 09/12/18 12:53 PM   Result Value Ref Range    Troponin-I, Qt. <0.05 <0.05 ng/mL   CBC WITH AUTOMATED DIFF    Collection Time: 09/12/18 12:53 PM   Result Value Ref Range    WBC 12.8 (H) 3.6 - 11.0 K/uL    RBC 4.72 3.80 - 5.20 M/uL    HGB 13.8 11.5 - 16.0 g/dL    HCT 40.9 35.0 - 47.0 %    MCV 86.7 80.0 - 99.0 FL    MCH 29.2 26.0 - 34.0 PG    MCHC 33.7 30.0 - 36.5 g/dL    RDW 12.2 11.5 - 14.5 %    PLATELET 894 472 - 532 K/uL    MPV 9.4 8.9 - 12.9 FL    NRBC 0.0 0  WBC    ABSOLUTE NRBC 0.00 0.00 - 0.01 K/uL    NEUTROPHILS 50 32 - 75 %    LYMPHOCYTES 44 12 - 49 %    MONOCYTES 5 5 - 13 %    EOSINOPHILS 1 0 - 7 %    BASOPHILS 1 0 - 1 %    IMMATURE GRANULOCYTES 0 0.0 - 0.5 %    ABS. NEUTROPHILS 6.4 1.8 - 8.0 K/UL    ABS. LYMPHOCYTES 5.6 (H) 0.8 - 3.5 K/UL    ABS. MONOCYTES 0.6 0.0 - 1.0 K/UL    ABS. EOSINOPHILS 0.1 0.0 - 0.4 K/UL    ABS. BASOPHILS 0.1 0.0 - 0.1 K/UL    ABS. IMM. GRANS. 0.0 0.00 - 0.04 K/UL    DF AUTOMATED       Imaging  CT head: neg for acute intracranial abnormality. CXR: was neg for PNA and pulmonary edema. Recommend PA and lateral chest radiograph when feasible to evaluate for  possible pulmonary nodule in the right midlung zone. This may merely represent costal cartilage calcification but a nodule is not excluded on this portable exam.    EKG:  normal EKG, normal sinus rhythm. Assessment and Plan   Keya Bhatia is a 75 y.o. female with dyslipidemia and hx of TIA who is admitted for TIA. CVA/TIA: Symptoms resolved after she arrived in the ED. Tele neuro was consulted in the ED: recommended admission for MRI brain. CT head neg for acute intracranial abnormality. CXR was neg for PNA and pulmonary edema.   - Admit to remote tele  - Vital signs per unit protocol  - Neurology consulted and appreciate the assistance and recommendations.   - MRI/MRA  - Carotid Dopplers  - Echocardiogram  - TSH, UA  - Fasting Lipid panel (LDL goal <70), HgbA1C  - Trop neg x1, f/u trops     - ASA Daily  - Consulted PT/OT for rehab eval, CM for disposition planning  - Pt passed bedside swallow  - Neuro check q4h   - CBC, CMP, Mg, Phos, UDS    Hyperlipidemia: Home med:  Atorvastatin 20mg daily   Lab Results   Component Value Date/Time    Cholesterol, total 149 08/13/2018 09:45 AM    HDL Cholesterol 54 08/13/2018 09:45 AM    LDL, calculated 65 08/13/2018 09:45 AM VLDL, calculated 30 08/13/2018 09:45 AM    Triglyceride 151 (H) 08/13/2018 09:45 AM   - Start Atorvastatin 40mg daily. FEN/GI - Regular diet, Pt passed bedside swallow.     Activity - As tolerated  DVT prophylaxis - Lovenox  GI prophylaxis - not indicated  Disposition - TBD    CODE STATUS:  FULL CODE       Patient to be discussed with Dr. Cielo Duncan, [address] [place] [address] Problems  Date Reviewed: 8/13/2018          Codes Class Noted POA    TIA (transient ischemic attack) ICD-10-CM: G45.9  ICD-9-CM: 435.9  9/12/2018 Unknown

## 2018-09-13 ENCOUNTER — APPOINTMENT (OUTPATIENT)
Dept: GENERAL RADIOLOGY | Age: 68
DRG: 069 | End: 2018-09-13
Attending: FAMILY MEDICINE
Payer: MEDICARE

## 2018-09-13 VITALS
OXYGEN SATURATION: 97 % | DIASTOLIC BLOOD PRESSURE: 65 MMHG | SYSTOLIC BLOOD PRESSURE: 118 MMHG | HEART RATE: 92 BPM | BODY MASS INDEX: 26.59 KG/M2 | WEIGHT: 159.61 LBS | RESPIRATION RATE: 16 BRPM | TEMPERATURE: 98.2 F | HEIGHT: 65 IN

## 2018-09-13 LAB
ALBUMIN SERPL-MCNC: 3.4 G/DL (ref 3.5–5)
ALBUMIN/GLOB SERPL: 1.2 {RATIO} (ref 1.1–2.2)
ALP SERPL-CCNC: 75 U/L (ref 45–117)
ALT SERPL-CCNC: 21 U/L (ref 12–78)
AMPHET UR QL SCN: NEGATIVE
ANION GAP SERPL CALC-SCNC: 8 MMOL/L (ref 5–15)
AST SERPL-CCNC: 15 U/L (ref 15–37)
BARBITURATES UR QL SCN: NEGATIVE
BASOPHILS # BLD: 0.1 K/UL (ref 0–0.1)
BASOPHILS NFR BLD: 1 % (ref 0–1)
BENZODIAZ UR QL: NEGATIVE
BILIRUB SERPL-MCNC: 0.4 MG/DL (ref 0.2–1)
BUN SERPL-MCNC: 19 MG/DL (ref 6–20)
BUN/CREAT SERPL: 20 (ref 12–20)
CALCIUM SERPL-MCNC: 9.1 MG/DL (ref 8.5–10.1)
CANNABINOIDS UR QL SCN: NEGATIVE
CHLORIDE SERPL-SCNC: 106 MMOL/L (ref 97–108)
CO2 SERPL-SCNC: 27 MMOL/L (ref 21–32)
COCAINE UR QL SCN: NEGATIVE
CREAT SERPL-MCNC: 0.96 MG/DL (ref 0.55–1.02)
DIFFERENTIAL METHOD BLD: ABNORMAL
DRUG SCRN COMMENT,DRGCM: NORMAL
EOSINOPHIL # BLD: 0.2 K/UL (ref 0–0.4)
EOSINOPHIL NFR BLD: 2 % (ref 0–7)
ERYTHROCYTE [DISTWIDTH] IN BLOOD BY AUTOMATED COUNT: 12.4 % (ref 11.5–14.5)
GLOBULIN SER CALC-MCNC: 2.8 G/DL (ref 2–4)
GLUCOSE SERPL-MCNC: 147 MG/DL (ref 65–100)
HCT VFR BLD AUTO: 38.4 % (ref 35–47)
HGB BLD-MCNC: 12.9 G/DL (ref 11.5–16)
IMM GRANULOCYTES # BLD: 0 K/UL (ref 0–0.04)
IMM GRANULOCYTES NFR BLD AUTO: 0 % (ref 0–0.5)
LYMPHOCYTES # BLD: 5.6 K/UL (ref 0.8–3.5)
LYMPHOCYTES NFR BLD: 53 % (ref 12–49)
MCH RBC QN AUTO: 29.4 PG (ref 26–34)
MCHC RBC AUTO-ENTMCNC: 33.6 G/DL (ref 30–36.5)
MCV RBC AUTO: 87.5 FL (ref 80–99)
METHADONE UR QL: NEGATIVE
MONOCYTES # BLD: 0.6 K/UL (ref 0–1)
MONOCYTES NFR BLD: 5 % (ref 5–13)
NEUTS SEG # BLD: 4.2 K/UL (ref 1.8–8)
NEUTS SEG NFR BLD: 40 % (ref 32–75)
NRBC # BLD: 0 K/UL (ref 0–0.01)
NRBC BLD-RTO: 0 PER 100 WBC
OPIATES UR QL: NEGATIVE
PCP UR QL: NEGATIVE
PLATELET # BLD AUTO: 239 K/UL (ref 150–400)
PMV BLD AUTO: 9.3 FL (ref 8.9–12.9)
POTASSIUM SERPL-SCNC: 3.6 MMOL/L (ref 3.5–5.1)
PROT SERPL-MCNC: 6.2 G/DL (ref 6.4–8.2)
RBC # BLD AUTO: 4.39 M/UL (ref 3.8–5.2)
SODIUM SERPL-SCNC: 141 MMOL/L (ref 136–145)
TROPONIN I SERPL-MCNC: <0.05 NG/ML
WBC # BLD AUTO: 10.6 K/UL (ref 3.6–11)

## 2018-09-13 PROCEDURE — 97165 OT EVAL LOW COMPLEX 30 MIN: CPT

## 2018-09-13 PROCEDURE — 96374 THER/PROPH/DIAG INJ IV PUSH: CPT

## 2018-09-13 PROCEDURE — 97116 GAIT TRAINING THERAPY: CPT

## 2018-09-13 PROCEDURE — 80053 COMPREHEN METABOLIC PANEL: CPT | Performed by: FAMILY MEDICINE

## 2018-09-13 PROCEDURE — 97535 SELF CARE MNGMENT TRAINING: CPT

## 2018-09-13 PROCEDURE — G8978 MOBILITY CURRENT STATUS: HCPCS

## 2018-09-13 PROCEDURE — 71046 X-RAY EXAM CHEST 2 VIEWS: CPT

## 2018-09-13 PROCEDURE — G8987 SELF CARE CURRENT STATUS: HCPCS

## 2018-09-13 PROCEDURE — 74011250637 HC RX REV CODE- 250/637: Performed by: FAMILY MEDICINE

## 2018-09-13 PROCEDURE — G8980 MOBILITY D/C STATUS: HCPCS

## 2018-09-13 PROCEDURE — G8988 SELF CARE GOAL STATUS: HCPCS

## 2018-09-13 PROCEDURE — 97161 PT EVAL LOW COMPLEX 20 MIN: CPT

## 2018-09-13 PROCEDURE — 85025 COMPLETE CBC W/AUTO DIFF WBC: CPT | Performed by: FAMILY MEDICINE

## 2018-09-13 PROCEDURE — 84484 ASSAY OF TROPONIN QUANT: CPT | Performed by: FAMILY MEDICINE

## 2018-09-13 PROCEDURE — 74011250637 HC RX REV CODE- 250/637: Performed by: STUDENT IN AN ORGANIZED HEALTH CARE EDUCATION/TRAINING PROGRAM

## 2018-09-13 PROCEDURE — 36415 COLL VENOUS BLD VENIPUNCTURE: CPT | Performed by: FAMILY MEDICINE

## 2018-09-13 PROCEDURE — G8979 MOBILITY GOAL STATUS: HCPCS

## 2018-09-13 PROCEDURE — 94760 N-INVAS EAR/PLS OXIMETRY 1: CPT

## 2018-09-13 PROCEDURE — 95816 EEG AWAKE AND DROWSY: CPT | Performed by: NURSE PRACTITIONER

## 2018-09-13 PROCEDURE — G8989 SELF CARE D/C STATUS: HCPCS

## 2018-09-13 RX ORDER — ATORVASTATIN CALCIUM 40 MG/1
40 TABLET, FILM COATED ORAL DAILY
Qty: 30 TAB | Refills: 0 | Status: SHIPPED | OUTPATIENT
Start: 2018-09-13 | End: 2018-09-18 | Stop reason: SDUPTHER

## 2018-09-13 RX ORDER — CEPHALEXIN 500 MG/1
500 CAPSULE ORAL 2 TIMES DAILY
Qty: 12 CAP | Refills: 0 | Status: SHIPPED | OUTPATIENT
Start: 2018-09-13 | End: 2018-09-13

## 2018-09-13 RX ORDER — ASPIRIN 325 MG
325 TABLET ORAL DAILY
Qty: 30 TAB | Refills: 0 | Status: SHIPPED | OUTPATIENT
Start: 2018-09-13 | End: 2018-10-04 | Stop reason: DRUGHIGH

## 2018-09-13 RX ORDER — CEPHALEXIN 500 MG/1
500 CAPSULE ORAL 2 TIMES DAILY
Qty: 8 CAP | Refills: 0 | Status: SHIPPED | OUTPATIENT
Start: 2018-09-13 | End: 2018-09-17

## 2018-09-13 RX ADMIN — CEPHALEXIN 500 MG: 250 CAPSULE ORAL at 09:09

## 2018-09-13 RX ADMIN — Medication 10 ML: at 13:38

## 2018-09-13 RX ADMIN — ASPIRIN 81 MG 81 MG: 81 TABLET ORAL at 09:08

## 2018-09-13 RX ADMIN — CEPHALEXIN 500 MG: 250 CAPSULE ORAL at 00:16

## 2018-09-13 RX ADMIN — Medication 10 ML: at 05:59

## 2018-09-13 NOTE — PROGRESS NOTES
Occupational Therapy neurological EVALUATION with discharge  Patient: Tone Duran (62 y.o. female)  Date: 9/13/2018  Primary Diagnosis: TIA (transient ischemic attack)        Precautions:        ASSESSMENT:  Based on the objective data described below, the patient presents with hospital admission for TIA. Patient received supine in bed, agreeable to evaluation. 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 Patient reports symptoms have resolved and now ready to return home. Patient able to perform all tasks and transfers with independence today. No weakness, decreased coordination or cognitive impairments noted. Patient without need for further skilled acute occupational therapy at this time. Discharge Recommendations: None  Further Equipment Recommendations for Discharge: none noted      SUBJECTIVE:   Patient stated I have 32 cows .     OBJECTIVE DATA SUMMARY:   HISTORY:   Past Medical History:   Diagnosis Date    Cancer ([facility] [address]     Stroke [facility])      Past Surgical History:   Procedure Laterality Date    BREAST SURGERY PROCEDURE UNLISTED      HX GYN         Prior Level of Function/Environment/Context: independent  Occupations in which the patient is/was successful, what are the barriers preventing that success:   Performance Patterns (routines, roles, habits, and rituals):   Personal Interests and/or values:   Expanded or extensive additional review of patient history:     Home Situation  Home Environment: Private residence  # Steps to Enter: 0  One/Two Story Residence: Two story  # of Interior Steps: 13  Living Alone: No  Support Systems: Spouse/Significant Other/Partner, Family member(s)  Patient Expects to be Discharged to[de-identified] Private residence  Current DME Used/Available at Home: Commode, bedside, Grab bars, Shower chair, Walker, rollator  Tub or Shower Type: Shower    Hand dominance: Right    EXAMINATION OF PERFORMANCE DEFICITS:  Cognitive/Behavioral Status: Skin: intact as seen    Edema: none noted       Hearing: Auditory  Auditory Impairment: None    Vision/Perceptual:                                     Range of Motion:  AROM: Within functional limits  PROM: Within functional limits                      Strength:    Strength: Within functional limits                Coordination:  Coordination: Within functional limits            Tone & Sensation:  Normal tone, sensation intact                              Balance:  Sitting: Intact  Standing: Intact; Without support    Functional Mobility and Transfers for ADLs:  Bed Mobility:  Rolling: Independent  Supine to Sit: Independent  Sit to Supine: Independent  Scooting: Independent    Transfers:  Sit to Stand: Independent  Functional Transfers  Toilet Transfer : Independent   Bed to Chair: Independent    ADL Assessment:  Feeding: Independent    Oral Facial Hygiene/Grooming: Independent    Bathing: Independent    Upper Body Dressing: Independent    Lower Body Dressing: Independent    Toileting: Independent                ADL Intervention and task modifications:                                          Therapeutic Exercise:     Functional Measure:   Fugl-Worrell Assessment of Motor Recovery after Stroke:     Reflex Activity  Flexors/Biceps/Fingers: Can be elicited  Extensors/Triceps: Can be elicited  Reflex Subtotal: 4    Volitional Movement Within Synergies  Shoulder Retraction: Full  Shoulder Elevation: Full  Shoulder Abduction (90 degrees): Full  Shoulder External Rotation: Full  Elbow Flexion: Full  Forearm Supination: Full  Shoulder Adduction/Internal Rotation: Full  Elbow Extension: Full  Forearm Pronation: Full  Subtotal: 18    Volitional Movement Mixing Synergies  Hand to Lumbar Spine: Full  Shoulder Flexion (0-90 degrees): Full  Pronation-Supination: Full  Subtotal: 6    Volitional Movement With Little or No Synergy  Shoulder Abduction (0-90 degrees): Full  Shoulder Flexion ( degrees): Full  Pronation/Supination: Full  Subtotal : 6    Normal Reflex Activity  Biceps, Triceps, Finger Flexors: Full  Subtotal : 2    Upper Extremity Total   Upper Extremity Total: 36    Wrist  Stability at 15 Degree Dorsiflexion: Full  Repeated Dorsiflexion/ Volar Flexion: Full  Stability at 15 Degree Dorsiflexion: Full  Repeated Dorsiflexion/ Volar Flexion: Full  Circumduction: Full  Wrist Total: 10    Hand  Mass Flexion: Full  Mass Extension: Full  Grasp A: Full  Grasp B: Full  Grasp C: Full  Grasp D: Full  Grasp E: Full  Hand Total: 14    Coordination/Speed  Tremor: None  Dysmetria: None  Time: <1s  Coordination/Speed Total : 6    Total A-D  Total A-D (Motor Function): 66/66     Percentage of impairment CH  0% CI  1-19% CJ  20-39% CK  40-59% CL  60-79% CM  80-99% CN  100%   Fugl-Worrell score: 0-66 66 53-65 39-52 26-38 13-25 1-12   0      This is a reliable/valid measure of arm function after a neurological event. It has established value to characterize functional status and for measuring spontaneous and therapy-induced recovery; tests proximal and distal motor functions. Fugl-Worrell Assessment - UE scores recorded between five and 30 days post neurologic event can be used to predict UE recovery at six months post neurologic event. Severe = 0-21 points   Moderately Severe = 22-33 points   Moderate = 34-47 points   Mild = 48-66 points  THA Juárez, CLEM Tierney, & BOY Davis (1992). Measurement of motor recovery after stroke: Outcome assessment and sample size requirements.  Stroke, 23, pp. 9607-4188.   ------------------------------------------------------------------------------------------------------------------------------------------------------------------  MCID:  Stroke:   Axel Key et al, 2001; n = 171; mean age 79 (6) years; assessed within 16 (12) days of stroke, Acute Stroke)  FMA Motor Scores from Admission to Discharge   10 point increase in FMA Upper Extremity = 1.5 change in discharge FIM   10 point increase in FMA Lower Extremity = 1.9 change in discharge FIM  MDC:   Stroke:   Pat Beatrice et al, 2008, n = 14, mean age = 59.9 (14.6) years, assessed on average 14 (6.5) months post stroke, Chronic Stroke)   FMA = 5.2 points for the Upper Extremity portion of the assessment     G codes: In compliance with CMSs Claims Based Outcome Reporting, the following G-code set was chosen for this patient based on their primary functional limitation being treated: The outcome measure chosen to determine the severity of the functional limitation was the [facility] with a score of 66/66 which was correlated with the impairment scale. ? Self Care:     - CURRENT STATUS: CH - 0% impaired, limited or restricted    - GOAL STATUS: CH - 0% impaired, limited or restricted    - D/C STATUS:  CH - 0% impaired, limited or restricted      Occupational Therapy Evaluation Charge Determination   History Examination Decision-Making   LOW Complexity : Brief history review  LOW Complexity : 1-3 performance deficits relating to physical, cognitive , or psychosocial skils that result in activity limitations and / or participation restrictions  LOW Complexity : No comorbidities that affect functional and no verbal or physical assistance needed to complete eval tasks       Based on the above components, the patient evaluation is determined to be of the following complexity level: LOW     Pain:  Pain Scale 1: Numeric (0 - 10)  Pain Intensity 1: 0              Activity Tolerance:   VSS  Please refer to the flowsheet for vital signs taken during this treatment.   After treatment:   [x]  Patient left in no apparent distress sitting up in chair  []  Patient left in no apparent distress in bed  [x]  Call bell left within reach  [x]  Nursing notified  []  Caregiver present  []  Bed alarm activated    COMMUNICATION/EDUCATION:   Findings and recommendations were discussed with: Physical Therapist and Registered Nurse    Patient was educated regarding Her deficit(s) of dizziness/right side weakness/tingling as this relates to Her diagnosis of TIA. She demonstrated Good understanding as evidenced by verbal confirmation. Patient and/or family was verbally educated on the BE FAST acronym for signs/symptoms of CVA and TIA. Informed patient to refer to the Stroke Binder for further BE FAST information. All questions answered with patient indicating good understanding. [x]      Home safety education was provided and the patient/caregiver indicated understanding. [x]      Patient/family have participated as able and agree with findings and recommendations. []      Patient is unable to participate in plan of care at this time.     Thank you for this referral.  Audrey Munoz OTR/L  Time Calculation: 28 mins

## 2018-09-13 NOTE — CONSULTS
[place]: [id number] [address] Neurology  [facility] [address]  [phone]        Name:   Kristen Naidu record #: [id number]  Admission Date: 9/12/2018   Who Consulted: Dr. Neo Up  Reason for Consult: Right sided weakness    HISTORY OF PRESENT ILLNESS   This is a 75 y.o. female with  has a past medical history of Cancer ([facility] [address] and Stroke ([facility] [address] who is admitted for dizziness. The Neurology Service is asked to evaluate for TIA versus stroke. Ms. Sejal Flores presented to the ED on 9/12/2018 sudden onset lightheaded, headache and chest pain while working on her generator engine, when she noticed that she could not find a part that she had just used. Her partner noticed her disorientation and brought her to the hospital.  They had to stop twice along the way to vomit due to her headache. She has a history of TIA but says her symptoms are completely different. Pt states she then began having right sided weakness and tingling in her right hand. Pt states her significant other was \"concerned she may be having a heart attack\" so he brought her to the ED for evaluation. She was having trouble walking at the time. Upon arrival she was evaluted by teleneurology who was concerned for BPPV or seizure. Presenting NIH stroke score:  2    Clinical Data  Imaging review:     Current rhythm:    Assessment/Plan:   1.   Transient Ischemic Attack, r/o Stroke:    · ASA 81 mg  · Will need ASA at discharge  · Neurochecks:  Every 4 hours  · Blood Sugar Goal:  140-180  · BP Goal: Less than 180/105 for 24 hours  · Telemetry for at least 24 hours  · Lancaster-Halpike negative  · eeg to rule out seizure or ictal process    Stroke work up  · A1C: 5.7  · LDL:  65  · TTE:  Read pending  · Follow up MRI:  No acute process  · Carotid vascular imaging:  No acute process    Risk factors for stroke include:  hx of stroke, HLD, physical inactivity  · Discussed with patient    · Discussed signs/symptoms of stroke and when to call 911    3. Mobility:   · Has been OOB. · PT/OT to eval for rehab    4. Diet:    · Does not need SLP, passed STAND     5. VTE Prophylaxes:   · Lovenox 40 mg, SQ daily      Thank you for allowing the Neurology Service the pleasure of participating in the care of your patient. This patient will be discussed with my collaborating care team physician Dr. Ean Willams and she may have further recommendations regarding this patient's care. Attending Attestation:     ==============================================================    Attending Addendum    I have reviewed the documentation provided by the nurse practitioner, Faye Mock, discussed her findings, clinical impression, and the proposed management plans with regards to this patient's encounter. I have personally evaluated the patient, verify the history and confirm physical findings. Below are my additional findings:    HPI  This is a 68-year-old female who presented to the emergency room with complaints of dizziness, headache, and confusion. Episode is not completely resolved. There is concern for possible vascular event. MRI of the brain was negative. Patient does have stroke risk factors of hyperlipidemia for which she takes Lipitor. She was also taking aspirin 81 mg daily prior to this. She denies any prior history of vertigo. She feels like her confusion is completely cleared up. She denies having any full loss of consciousness.     CLINICAL DATA REVIEW  IMAGING: MRI brain: Negative (I personally reviewed these images in PACS and this is my impression)  MRA head/neck: Negative  TTE pending  EEG pending    PHYSICAL EXAM (additional findings)  Patient Vitals for the past 8 hrs:   Temp Pulse Resp BP SpO2   09/13/18 1142 97.9 °F (36.6 °C) 82 16 130/80 97 %   09/13/18 1030 - 94 - 126/71 -   09/13/18 1028 - 89 - 135/70 -   09/13/18 1023 - 88 - 119/70 -   09/13/18 0853 97.8 °F (36.6 °C) 69 16 147/82 95 %       Neurologic: Gen: Attention normal             Language: naming, repetition, fluency normal             Memory: intact recent and remote memory  Cranial Nerves: 2- 12 intact  Motor: normal bulk and tone, no tremor              Strength: 5/5 all four extremities  Sensory: intact to LT, PP, vibration, and temperature  Coordination: FTN and HTS intact, Rhomberg negative  Gait: normal gait including tandem          ADDITIONAL ASSESSMENT AND RECOMMENDATIONS:  This is a 79-year-old female who presented after an episode of headache, confusion, and dizziness. Symptoms are now resolved. She could have had benign paroxysmal positional vertigo given that she was lying on the ground when the event started. However vascular event cannot be completely ruled out. There is also the potential the patient is having a complicated migraine. Patient is appropriately risk modified for stroke. We will also check EEG to rule out seizure. 1.  MRI brain/MRA head and neck: Negative  2. EEG pending  3. Increase aspirin to 325 mg daily and continue statin for stroke prevention  4. Stroke education provided  5. [place]    We will follow-up on EEG otherwise no further Nuromax. Patient to follow-up in the neurology clinic in 2-3 weeks. Please call further questions      Nelly Cody MD  September 13, 2018       Review of Systems: 10 point ROS was performed. Pertinent positives listed in HPI. Negative ROS is as follows. Pt denies: angina, palpitations, paresthesias, weakness, vision loss, slurred speech, aphasia, confusion, fever, chills, falls, headache, diplopia, back pain, neck pain, prior episodes of vertigo, hallucinations, new medications or dosage changes.     PHYSICAL EXAM     Visit Vitals    /82 (BP 1 Location: Right arm, BP Patient Position: At rest)    Pulse 69    Temp 97.8 °F (36.6 °C)    Resp 16    Ht 5' 5\" (1.651 m)    Wt 72.4 kg (159 lb 9.8 oz)    SpO2 95%    BMI 26.56 kg/m2      O2 Device: Room air    Temp (24hrs), Av.8 °F (36.6 °C), Min:97.7 °F (36.5 °C), Max:98.1 °F (36.7 °C)        1901 -  0700  In: 5 [P.O.:720]  Out: 600 [Urine:600]     General:  Alert, cooperative, no acute distress. Lungs:   Clear to auscultation bilaterally. No crackles/wheezes. Heart:  Abdominal:  Regular rate and rhythm, No murmur, click, rub or gallop. Soft and nondistended   Skin: Skin color, texture, turgor normal.    NEUROLOGICAL EXAM    Appearance:  Well developed, well nourished,  and is in no acute distress. Mental Status: Oriented to time, place and person. Fully attentive. No aphasia. Full fund of knowledge. Normal recent and remote memory. Cranial Nerves:   Intact visual fields. PERRL, EOM's full, no nystagmus, no ptosis. Facial sensation is normal. Facial movement is symmetric. Palate is midline. Normal sternocleidomastoid strength. Tongue is midline. Reflexes:   Deep tendon reflexes 2+/4 and symmetrical.   Sensory:   Normal to temperature and vibration. Gait:  Normal gait. Tremor:   No tremor noted. Cerebellar:  No cerebellar signs present. Neurovascular:  Normal heart sounds and regular rhythm. No carotid bruits. Motor: No pronator drift of either outstretched arm. Deltoid Biceps Triceps Wrist Extension Finger Abduction   L 5 5 5 5 5   R 5 5 5 5 5      Hip Flexion Hip Extension Knee Flexion Knee Extension Ankle Dorsiflexion Ankle Plantarflexion   L 5 5 5 5 5 5   R 5 5 5 5 5 5        Reflexes:     Biceps Triceps Plantar Patellar Achilles   L 2 2 2 2 2   R 2 2 2 2 2      History  Past Medical History:   Diagnosis Date    Cancer ([facility] [address]     Stroke [facility])      Past Surgical History:   Procedure Laterality Date    BREAST SURGERY PROCEDURE UNLISTED      HX GYN       No family history on file.   Social History     Social History    Marital status:      Spouse name: N/A    Number of children: N/A    Years of education: N/A     Occupational History    Not on file.     Social History Main Topics    Smoking status: Never Smoker    Smokeless tobacco: Never Used    Alcohol use No    Drug use: No    Sexual activity: Not Currently     Other Topics Concern    Not on file     Social History Narrative       Allergies   Allergies   Allergen Reactions    Ampicillin Hives    Bactrim [Sulfamethoprim] Hives       Outpatient Meds  No current facility-administered medications on file prior to encounter. Current Outpatient Prescriptions on File Prior to Encounter   Medication Sig Dispense Refill    aspirin delayed-release 81 mg tablet Take 81 mg by mouth nightly.          Inpatient Meds    Current Facility-Administered Medications:     sodium chloride (NS) flush 5-10 mL, 5-10 mL, IntraVENous, Q8H, Marc Russell MD, 10 mL at 09/13/18 0559    sodium chloride (NS) flush 5-10 mL, 5-10 mL, IntraVENous, PRN, Marc Russell MD    acetaminophen (TYLENOL) tablet 650 mg, 650 mg, Oral, Q4H PRN **OR** acetaminophen (TYLENOL) solution 650 mg, 650 mg, Per NG tube, Q4H PRN **OR** acetaminophen (TYLENOL) suppository 650 mg, 650 mg, Rectal, Q4H PRN, Marc Russell MD    aspirin chewable tablet 81 mg, 81 mg, Oral, DAILY, Marc Russell MD, 81 mg at 09/13/18 0908    enoxaparin (LOVENOX) injection 40 mg, 40 mg, SubCUTAneous, Q24H, Marc Russell MD    atorvastatin (LIPITOR) tablet 40 mg, 40 mg, Oral, QHS, Marc Russell MD, 40 mg at 09/12/18 2156    cephALEXin (KEFLEX) capsule 500 mg, 500 mg, Oral, BID, Carole Peters MD, 500 mg at 09/13/18 0909    Recent Results (from the past 24 hour(s))   GLUCOSE, POC    Collection Time: 09/12/18 12:44 PM   Result Value Ref Range    Glucose (POC) 105 (H) 65 - 100 mg/dL    Performed by Joanne Perry    EKG, 12 LEAD, INITIAL    Collection Time: 09/12/18 12:45 PM   Result Value Ref Range    Ventricular Rate 65 BPM    Atrial Rate 65 BPM    P-R Interval 130 ms    QRS Duration 82 ms    Q-T Interval 426 ms    QTC Calculation (Bezet) 443 ms    Calculated P Axis 44 degrees    Calculated R Axis 8 degrees    Calculated T Axis 18 degrees    Diagnosis       Normal sinus rhythm  Normal ECG  No previous ECGs available  Confirmed by Alcides Sawant M.D., Jone Buttner ([id number]) on 9/12/2018 2:54:36 PM     SAMPLES BEING HELD    Collection Time: 09/12/18 12:53 PM   Result Value Ref Range    SAMPLES BEING HELD 1PST,1LAV,1BLU,1GRN,1SST     COMMENT        Add-on orders for these samples will be processed based on acceptable specimen integrity and analyte stability, which may vary by analyte. METABOLIC PANEL, COMPREHENSIVE    Collection Time: 09/12/18 12:53 PM   Result Value Ref Range    Sodium 141 136 - 145 mmol/L    Potassium 4.2 3.5 - 5.1 mmol/L    Chloride 106 97 - 108 mmol/L    CO2 26 21 - 32 mmol/L    Anion gap 9 5 - 15 mmol/L    Glucose 100 65 - 100 mg/dL    BUN 19 6 - 20 MG/DL    Creatinine 0.96 0.55 - 1.02 MG/DL    BUN/Creatinine ratio 20 12 - 20      GFR est AA >60 >60 ml/min/1.73m2    GFR est non-AA 58 (L) >60 ml/min/1.73m2    Calcium 9.2 8.5 - 10.1 MG/DL    Bilirubin, total 0.7 0.2 - 1.0 MG/DL    ALT (SGPT) 25 12 - 78 U/L    AST (SGOT) 18 15 - 37 U/L    Alk.  phosphatase 85 45 - 117 U/L    Protein, total 6.9 6.4 - 8.2 g/dL    Albumin 3.8 3.5 - 5.0 g/dL    Globulin 3.1 2.0 - 4.0 g/dL    A-G Ratio 1.2 1.1 - 2.2     TROPONIN I    Collection Time: 09/12/18 12:53 PM   Result Value Ref Range    Troponin-I, Qt. <0.05 <0.05 ng/mL   CBC WITH AUTOMATED DIFF    Collection Time: 09/12/18 12:53 PM   Result Value Ref Range    WBC 12.8 (H) 3.6 - 11.0 K/uL    RBC 4.72 3.80 - 5.20 M/uL    HGB 13.8 11.5 - 16.0 g/dL    HCT 40.9 35.0 - 47.0 %    MCV 86.7 80.0 - 99.0 FL    MCH 29.2 26.0 - 34.0 PG    MCHC 33.7 30.0 - 36.5 g/dL    RDW 12.2 11.5 - 14.5 %    PLATELET 046 796 - 319 K/uL    MPV 9.4 8.9 - 12.9 FL    NRBC 0.0 0  WBC    ABSOLUTE NRBC 0.00 0.00 - 0.01 K/uL    NEUTROPHILS 50 32 - 75 %    LYMPHOCYTES 44 12 - 49 %    MONOCYTES 5 5 - 13 %    EOSINOPHILS 1 0 - 7 %    BASOPHILS 1 0 - 1 %    IMMATURE GRANULOCYTES 0 0.0 - 0.5 %    ABS. NEUTROPHILS 6.4 1.8 - 8.0 K/UL    ABS. LYMPHOCYTES 5.6 (H) 0.8 - 3.5 K/UL    ABS. MONOCYTES 0.6 0.0 - 1.0 K/UL    ABS. EOSINOPHILS 0.1 0.0 - 0.4 K/UL    ABS. BASOPHILS 0.1 0.0 - 0.1 K/UL    ABS. IMM.  GRANS. 0.0 0.00 - 0.04 K/UL    DF AUTOMATED     HEMOGLOBIN A1C WITH EAG    Collection Time: 09/12/18 12:53 PM   Result Value Ref Range    Hemoglobin A1c 5.7 4.2 - 6.3 %    Est. average glucose 117 mg/dL   MAGNESIUM    Collection Time: 09/12/18 12:53 PM   Result Value Ref Range    Magnesium 2.3 1.6 - 2.4 mg/dL   PHOSPHORUS    Collection Time: 09/12/18 12:53 PM   Result Value Ref Range    Phosphorus 3.0 2.6 - 4.7 MG/DL   PROTHROMBIN TIME + INR    Collection Time: 09/12/18 12:53 PM   Result Value Ref Range    INR 1.0 0.9 - 1.1      Prothrombin time 10.1 9.0 - 11.1 sec   TSH 3RD GENERATION    Collection Time: 09/12/18 12:53 PM   Result Value Ref Range    TSH 1.02 0.36 - 3.74 uIU/mL   TROPONIN I    Collection Time: 09/12/18  6:52 PM   Result Value Ref Range    Troponin-I, Qt. <0.05 <0.05 ng/mL   URINALYSIS W/ REFLEX CULTURE    Collection Time: 09/12/18 10:51 PM   Result Value Ref Range    Color YELLOW/STRAW      Appearance CLOUDY (A) CLEAR      Specific gravity 1.006 1.003 - 1.030      pH (UA) 7.0 5.0 - 8.0      Protein NEGATIVE  NEG mg/dL    Glucose NEGATIVE  NEG mg/dL    Ketone NEGATIVE  NEG mg/dL    Bilirubin NEGATIVE  NEG      Blood NEGATIVE  NEG      Urobilinogen 0.2 0.2 - 1.0 EU/dL    Nitrites NEGATIVE  NEG      Leukocyte Esterase NEGATIVE  NEG      WBC 0-4 0 - 4 /hpf    RBC 0-5 0 - 5 /hpf    Epithelial cells FEW FEW /lpf    Bacteria 4+ (A) NEG /hpf    UA:UC IF INDICATED URINE CULTURE ORDERED (A) CNI      Hyaline cast 0-2 0 - 5 /lpf   DRUG SCREEN, URINE    Collection Time: 09/12/18 10:51 PM   Result Value Ref Range    AMPHETAMINES NEGATIVE  NEG      BARBITURATES NEGATIVE  NEG      BENZODIAZEPINES NEGATIVE  NEG      COCAINE NEGATIVE  NEG      METHADONE NEGATIVE  NEG      OPIATES NEGATIVE  NEG      PCP(PHENCYCLIDINE) NEGATIVE  NEG      THC (TH-CANNABINOL) NEGATIVE  NEG      Drug screen comment (NOTE)    CBC WITH AUTOMATED DIFF    Collection Time: 09/13/18  3:00 AM   Result Value Ref Range    WBC 10.6 3.6 - 11.0 K/uL    RBC 4.39 3.80 - 5.20 M/uL    HGB 12.9 11.5 - 16.0 g/dL    HCT 38.4 35.0 - 47.0 %    MCV 87.5 80.0 - 99.0 FL    MCH 29.4 26.0 - 34.0 PG    MCHC 33.6 30.0 - 36.5 g/dL    RDW 12.4 11.5 - 14.5 %    PLATELET 654 088 - 472 K/uL    MPV 9.3 8.9 - 12.9 FL    NRBC 0.0 0  WBC    ABSOLUTE NRBC 0.00 0.00 - 0.01 K/uL    NEUTROPHILS 40 32 - 75 %    LYMPHOCYTES 53 (H) 12 - 49 %    MONOCYTES 5 5 - 13 %    EOSINOPHILS 2 0 - 7 %    BASOPHILS 1 0 - 1 %    IMMATURE GRANULOCYTES 0 0.0 - 0.5 %    ABS. NEUTROPHILS 4.2 1.8 - 8.0 K/UL    ABS. LYMPHOCYTES 5.6 (H) 0.8 - 3.5 K/UL    ABS. MONOCYTES 0.6 0.0 - 1.0 K/UL    ABS. EOSINOPHILS 0.2 0.0 - 0.4 K/UL    ABS. BASOPHILS 0.1 0.0 - 0.1 K/UL    ABS. IMM. GRANS. 0.0 0.00 - 0.04 K/UL    DF AUTOMATED     METABOLIC PANEL, COMPREHENSIVE    Collection Time: 09/13/18  3:00 AM   Result Value Ref Range    Sodium 141 136 - 145 mmol/L    Potassium 3.6 3.5 - 5.1 mmol/L    Chloride 106 97 - 108 mmol/L    CO2 27 21 - 32 mmol/L    Anion gap 8 5 - 15 mmol/L    Glucose 147 (H) 65 - 100 mg/dL    BUN 19 6 - 20 MG/DL    Creatinine 0.96 0.55 - 1.02 MG/DL    BUN/Creatinine ratio 20 12 - 20      GFR est AA >60 >60 ml/min/1.73m2    GFR est non-AA 58 (L) >60 ml/min/1.73m2    Calcium 9.1 8.5 - 10.1 MG/DL    Bilirubin, total 0.4 0.2 - 1.0 MG/DL    ALT (SGPT) 21 12 - 78 U/L    AST (SGOT) 15 15 - 37 U/L    Alk.  phosphatase 75 45 - 117 U/L    Protein, total 6.2 (L) 6.4 - 8.2 g/dL    Albumin 3.4 (L) 3.5 - 5.0 g/dL    Globulin 2.8 2.0 - 4.0 g/dL    A-G Ratio 1.2 1.1 - 2.2     TROPONIN I    Collection Time: 09/13/18  3:00 AM   Result Value Ref Range    Troponin-I, Qt. <0.05 <0.05 ng/mL       Care Plan discussed with:  Patient x   Family    RN    Care Manager Consultant/Specialist:       Tiffanie Meeks, ACNP-BC

## 2018-09-13 NOTE — PROGRESS NOTES
physical Therapy neuro EVALUATION/discharge     Patient: Lee Ghosh [de-identified]75 y.o. female)  Date: 9/13/2018  Primary Diagnosis: TIA (transient ischemic attack)               ASSESSMENT :  Based on the objective data described above, the patient presents with independent with ambulation without assistive device as well as up and down stairs and independent with all functional mobility. Reviewed sign and symptoms of stroke with the patient and verbalized understanding. Ordonez balance test results 56/56 which is low fall risk  Reviewed all safety precaution and home exercise program with the patient, verbalized understanding, clear to go home per Physical Therapy perspective. Skilled physical therapy is not indicated at this time. PLAN :  Discharge Recommendations: None  Further Equipment Recommendations for Discharge: none       SUBJECTIVE:   Patient stated I feel much better now.     OBJECTIVE DATA SUMMARY:   HISTORY:    Past Medical History:   Diagnosis Date    Cancer ([facility] [address]     Stroke [facility])      Past Surgical History:   Procedure Laterality Date    BREAST SURGERY PROCEDURE UNLISTED      HX GYN       Prior Level of Function/Home Situation: Independent community ambulator without assistive device. Personal factors and/or comorbidities impacting plan of care:     Home Situation  Home Environment: Private residence  # Steps to Enter: 0  One/Two Story Residence: Two story  # of Interior Steps: 13  Living Alone: No  Support Systems: Spouse/Significant Other/Partner, Family member(s)  Patient Expects to be Discharged to[de-identified] Private residence  Current DME Used/Available at Home: Commode, bedside, Grab bars, Shower chair, Walker, rollator  Tub or Shower Type: Shower    EXAMINATION/PRESENTATION/DECISION MAKING:   Critical Behavior:  Neurologic State: Alert  Orientation Level: Oriented X4  Cognition: Appropriate for age attention/concentration, Follows commands     Hearing:   Auditory  Auditory Impairment: None    Range Of Motion:  AROM: Within functional limits           PROM: Within functional limits           Strength:    Strength: Within functional limits                    Tone & Sensation:                                  Coordination:  Coordination: Within functional limits  Vision:      Functional Mobility:  Bed Mobility:  Rolling: Independent  Supine to Sit: Independent  Sit to Supine: Independent  Scooting: Independent  Transfers:  Sit to Stand: Independent  Stand to Sit: Independent  Stand Pivot Transfers: Independent     Bed to Chair: Independent              Balance:   Sitting: Intact  Standing: Intact; Without support  Ambulation/Gait Training:  Distance (ft): 200 Feet (ft)     Ambulation - Level of Assistance: Independent     Gait Description (WDL): Within defined limits                                          Stair Training:  Number of Stairs Trained: 4  Stairs - Level of Assistance: Independent  Rail Use: Both       Therapeutic Exercises:    Instructed patient to continue active range of motion exercise on both legs while up on chair or on bed. Functional Measure:  Ordonez Balance Test:    Sitting to Standin  Standing Unsupported: 4  Sitting with Back Unsupported: 4  Standing to Sittin  Transfers: 4  Standing Unsupported with Eyes Closed: 4  Standing Unsupported with Feet Together: 4  Reach Forward with Outstretched Arm: 4   Object: 4  Turn to Look Over Shoulders: 4  Turn 360 Degrees: 4  Alternate Foot on Step/Stool: 4  Standing Unsupported One Foot in Front: 4  Stand on One Le  Total: 56         56=Maximum possible score;   0-20=High fall risk  21-40=Moderate fall risk   41-56=Low fall risk     Ordonez Balance Test and G-code impairment scale:  Percentage of Impairment CH    0%   CI    1-19% CJ    20-39% CK    40-59% CL    60-79% CM    80-99% CN     100%   Ordonez   Score 0-56 56 45-55 34-44 23-33 12-22 1-11 0     G codes:   In compliance with CMSs Claims Based Outcome Reporting, the following G-code set was chosen for this patient based on their primary functional limitation being treated: The outcome measure chosen to determine the severity of the functional limitation was the dooley balance test with a score of 56/56 which was correlated with the impairment scale. ? Mobility - Walking and Moving Around:     - CURRENT STATUS: CH - 0% impaired, limited or restricted    - GOAL STATUS: CH - 0% impaired, limited or restricted    - D/C STATUS:  CH - 0% impaired, limited or restricted      Physical Therapy Evaluation Charge Determination   History Examination Presentation Decision-Making   LOW Complexity : Zero comorbidities / personal factors that will impact the outcome / POC LOW Complexity : 1-2 Standardized tests and measures addressing body structure, function, activity limitation and / or participation in recreation  LOW Complexity : Stable, uncomplicated  Other outcome measures dooley balance test  LOW       Based on the above components, the patient evaluation is determined to be of the following complexity level: LOW     Pain:  Pain Scale 1: Numeric (0 - 10)  Pain Intensity 1: 0              Activity Tolerance:   Good. Please refer to the flowsheet for vital signs taken during this treatment. After treatment:   [x]         Patient left in no apparent distress sitting up in chair  []         Patient left in no apparent distress in bed  [x]         Call bell left within reach  [x]         Nursing notified  [x]         Caregiver present  []         Bed alarm activated    COMMUNICATION/EDUCATION:   Patient was educated regarding Her deficit(s) of right sided weakness as this relates to Her diagnosis of TIA. She demonstrated Excellent understanding as evidenced by recall. Patient and/or family was verbally educated on the BE FAST acronym for signs/symptoms of CVA and TIA. BE FAST was written on patient's communication board  for visual education and reinforcement.   All questions answered with patient indicating good understanding. [x]   Fall prevention education was provided and the patient/caregiver indicated understanding. [x]   Patient/family have participated as able and agree with findings and recommendations. []   Patient is unable to participate in plan of care at this time. Findings and recommendations were discussed with: Occupational Therapist, Registered Nurse, Physician,  and patient    Thank you for this referral.  Saulo Andersen PT,WCC.    Time Calculation: 26 mins

## 2018-09-13 NOTE — PHYSICIAN ADVISORY
Short Stay Review       Pt Name:  Jeniffer Alanis   MR#  [id number]   CSN#   [id number]   [address]  [phone]  @ [address] [place] [address]   Hospitalization date  9/12/2018 12:32 PM  No discharge date for patient encounter. Current Attending Physician  Vandana Vergara MD     A discharge order has been placed for this episode of hospital care for Ms. Jeniffer Alansi; since this hospital stay is less than two midnights, I reviewed Ms. Tasneem Bridges's chart. Ms. May Bridges's healthcare insurance/benefit include:  [place]or: [place] MED[place] / Plan: [place] MEDICARE PART A & B / Product Type: Medicare /     Utilization Review related case summary:   Age  75 y.o.   BMI  Body mass index is 26.56 kg/(m^2). PMHx includes  Unknown Cancer , Stroke    Hospital course  The pt presented with CVA like symptoms that resolved quickly.    She went through CVA workup and that is reported negative for acute stroke    Risk of deterioration at the time this patient  was hospitalized     Moderate           On the basis of chart review, this patient's hospitalization status         Should be changed to [address] MD MPH FACP   Cell : [phone]  Physician [address]   Utilization Review, Care Management         CSN:  [id number]   LINDA:   [id number]  Admitted on :  9/12/2018   Discharge order

## 2018-09-13 NOTE — PROGRESS NOTES
[facility] FAMILY MEDICINE RESIDENCY PROGRAM   Daily Progress Note    Date: 9/13/2018    Assessment/Plan:   Ronaldo Cazares is a 75 y.o. female who is hospitalized for TIA/CVA r/o.     24 Hour Events: No acute events overnight     *CVA/TIA: Symptoms resolved after she arrived in the ED. Tele neuro was consulted in the ED: recommended admission for MRI brain. CT head neg for acute intracranial abnormality. CXR was neg for PNA and pulmonary edema.   - Neurology consulted and appreciate the assistance and recommendations.   - MRI/MRA (head/neck)- No acute intracranial process. No intracranial mass, hemorrhage or evidence of acute infarction. No aneurysm, dissection or evidence of hemodynamically significant stenosis. - CT head: neg for acute intracranial abnormality. CXR: was neg for PNA and pulmonary edema.   - Echocardiogram: no arrhythmia    - UA 4+ bacteria started on Keflex 500mg BID for 5 day course f/u Uclx  - TSH wnl   - Fasting Lipid panel (LDL goal <70), HgbA1C  - Trop neg x3,   - ASA Daily  - f/u ECHO   - Consulted PT/OT for rehab eval, CM for disposition planning  - Pt passed bedside swallow  - Neuro check per routine   - UDS negative      Hyperlipidemia: Home med: Atorvastatin 20mg daily         Lab Results   Component Value Date/Time     Cholesterol, total 149 08/13/2018 09:45 AM     HDL Cholesterol 54 08/13/2018 09:45 AM     LDL, calculated 65 08/13/2018 09:45 AM     VLDL, calculated 30 08/13/2018 09:45 AM     Triglyceride 151 (H) 08/13/2018 09:45 AM   - Start Atorvastatin 40mg daily.          FEN/GI - Regular diet, Pt passed bedside swallow. Activity - As tolerated  DVT prophylaxis - Lovenox  GI prophylaxis - not indicated  Disposition - TBD     CODE STATUS:  FULL CODE    Patient was discussed with Dr. Kush Blanchard MD  Family Medicine Resident  9/13/2018 6:50 AM             Subjective  No acute events overnight.   Pt Denies chills, headaches, chest pain, shortness of breath, palpitations, abdominal pain, nausea and vomiting, and LE edema. Inpatient Medications  Current Facility-Administered Medications   Medication Dose Route Frequency    sodium chloride (NS) flush 5-10 mL  5-10 mL IntraVENous Q8H    sodium chloride (NS) flush 5-10 mL  5-10 mL IntraVENous PRN    acetaminophen (TYLENOL) tablet 650 mg  650 mg Oral Q4H PRN    Or    acetaminophen (TYLENOL) solution 650 mg  650 mg Per NG tube Q4H PRN    Or    acetaminophen (TYLENOL) suppository 650 mg  650 mg Rectal Q4H PRN    aspirin chewable tablet 81 mg  81 mg Oral DAILY    enoxaparin (LOVENOX) injection 40 mg  40 mg SubCUTAneous Q24H    atorvastatin (LIPITOR) tablet 40 mg  40 mg Oral QHS    cephALEXin (KEFLEX) capsule 500 mg  500 mg Oral BID         Allergies  Allergies   Allergen Reactions    Ampicillin Hives    Bactrim [Sulfamethoprim] Hives         Objective  Vitals:  Patient Vitals for the past 8 hrs:   Temp Pulse Resp BP SpO2   09/13/18 0302 97.8 °F (36.6 °C) 74 14 110/60 93 %   09/12/18 2348 98.1 °F (36.7 °C) 71 18 115/70 93 %   09/12/18 2341 - 70 - - -         I/O:    Intake/Output Summary (Last 24 hours) at 09/13/18 0650  Last data filed at 09/13/18 0619   Gross per 24 hour   Intake              720 ml   Output              600 ml   Net              120 ml     Last shift:    09/12 1901 - 09/13 0700  In: 720 [P.O.:720]  Out: 600 [Urine:600]  Last 3 shifts:         Physical Exam:  General: No acute distress. Alert. Cooperative. HEENT: Normocephalic. Atraumatic. [place] Mellow Conjunctiva pink. Sclera white. PERRL. MMM   Respiratory: CTAB. No w/r/r/c.   Cardiovascular: RRR. Normal S1,S2. No m/r/g. 2+ pulses in DP bilaterally   GI: + bowel sounds. Nontender. No rebound tenderness or guarding. Nondistended   Extremities:  Neuro  No edema. No tenderness. AAOx3, CN II-XII grossly intact. Motor: 5/5 strength in upper and lower extremities. Biceps and patella 2+ reflexes. Sensory: Normal to touch, pinprick.  GAIT: Normal Laboratory Data  Recent Results (from the past 12 hour(s))   TROPONIN I    Collection Time: 09/12/18  6:52 PM   Result Value Ref Range    Troponin-I, Qt. <0.05 <0.05 ng/mL   URINALYSIS W/ REFLEX CULTURE    Collection Time: 09/12/18 10:51 PM   Result Value Ref Range    Color YELLOW/STRAW      Appearance CLOUDY (A) CLEAR      Specific gravity 1.006 1.003 - 1.030      pH (UA) 7.0 5.0 - 8.0      Protein NEGATIVE  NEG mg/dL    Glucose NEGATIVE  NEG mg/dL    Ketone NEGATIVE  NEG mg/dL    Bilirubin NEGATIVE  NEG      Blood NEGATIVE  NEG      Urobilinogen 0.2 0.2 - 1.0 EU/dL    Nitrites NEGATIVE  NEG      Leukocyte Esterase NEGATIVE  NEG      WBC 0-4 0 - 4 /hpf    RBC 0-5 0 - 5 /hpf    Epithelial cells FEW FEW /lpf    Bacteria 4+ (A) NEG /hpf    UA:UC IF INDICATED URINE CULTURE ORDERED (A) CNI      Hyaline cast 0-2 0 - 5 /lpf   DRUG SCREEN, URINE    Collection Time: 09/12/18 10:51 PM   Result Value Ref Range    AMPHETAMINES NEGATIVE  NEG      BARBITURATES NEGATIVE  NEG      BENZODIAZEPINES NEGATIVE  NEG      COCAINE NEGATIVE  NEG      METHADONE NEGATIVE  NEG      OPIATES NEGATIVE  NEG      PCP(PHENCYCLIDINE) NEGATIVE  NEG      THC (TH-CANNABINOL) NEGATIVE  NEG      Drug screen comment (NOTE)    CBC WITH AUTOMATED DIFF    Collection Time: 09/13/18  3:00 AM   Result Value Ref Range    WBC 10.6 3.6 - 11.0 K/uL    RBC 4.39 3.80 - 5.20 M/uL    HGB 12.9 11.5 - 16.0 g/dL    HCT 38.4 35.0 - 47.0 %    MCV 87.5 80.0 - 99.0 FL    MCH 29.4 26.0 - 34.0 PG    MCHC 33.6 30.0 - 36.5 g/dL    RDW 12.4 11.5 - 14.5 %    PLATELET 166 334 - 095 K/uL    MPV 9.3 8.9 - 12.9 FL    NRBC 0.0 0  WBC    ABSOLUTE NRBC 0.00 0.00 - 0.01 K/uL    NEUTROPHILS 40 32 - 75 %    LYMPHOCYTES 53 (H) 12 - 49 %    MONOCYTES 5 5 - 13 %    EOSINOPHILS 2 0 - 7 %    BASOPHILS 1 0 - 1 %    IMMATURE GRANULOCYTES 0 0.0 - 0.5 %    ABS. NEUTROPHILS 4.2 1.8 - 8.0 K/UL    ABS. LYMPHOCYTES 5.6 (H) 0.8 - 3.5 K/UL    ABS. MONOCYTES 0.6 0.0 - 1.0 K/UL    ABS.  EOSINOPHILS 0.2 0.0 - 0.4 K/UL    ABS. BASOPHILS 0.1 0.0 - 0.1 K/UL    ABS. IMM. GRANS. 0.0 0.00 - 0.04 K/UL    DF AUTOMATED     METABOLIC PANEL, COMPREHENSIVE    Collection Time: 09/13/18  3:00 AM   Result Value Ref Range    Sodium 141 136 - 145 mmol/L    Potassium 3.6 3.5 - 5.1 mmol/L    Chloride 106 97 - 108 mmol/L    CO2 27 21 - 32 mmol/L    Anion gap 8 5 - 15 mmol/L    Glucose 147 (H) 65 - 100 mg/dL    BUN 19 6 - 20 MG/DL    Creatinine 0.96 0.55 - 1.02 MG/DL    BUN/Creatinine ratio 20 12 - 20      GFR est AA >60 >60 ml/min/1.73m2    GFR est non-AA 58 (L) >60 ml/min/1.73m2    Calcium 9.1 8.5 - 10.1 MG/DL    Bilirubin, total 0.4 0.2 - 1.0 MG/DL    ALT (SGPT) 21 12 - 78 U/L    AST (SGOT) 15 15 - 37 U/L    Alk.  phosphatase 75 45 - 117 U/L    Protein, total 6.2 (L) 6.4 - 8.2 g/dL    Albumin 3.4 (L) 3.5 - 5.0 g/dL    Globulin 2.8 2.0 - 4.0 g/dL    A-G Ratio 1.2 1.1 - 2.2     TROPONIN I    Collection Time: 09/13/18  3:00 AM   Result Value Ref Range    Troponin-I, Qt. <0.05 <0.05 ng/mL         Imaging      Hospital Problems:  Hospital Problems  Date Reviewed: 8/13/2018          Codes Class Noted POA    * (Principal)TIA (transient ischemic attack) ICD-10-CM: G45.9  ICD-9-CM: 435.9  9/12/2018 Yes        High cholesterol ICD-10-CM: E78.00  ICD-9-CM: 272.0  8/13/2018 Yes        Vertigo ICD-10-CM: R42  ICD-9-CM: 780.4  6/13/2017 Yes

## 2018-09-13 NOTE — PROGRESS NOTES
Bedside and Verbal shift change report given to Redd Hyman (oncoming nurse) by Zak Walsh (offgoing nurse). Report included the following information SBAR, Kardex, ED Summary, Intake/Output, MAR, Recent Results and Cardiac Rhythm sinus.

## 2018-09-13 NOTE — DISCHARGE INSTRUCTIONS
HOME DISCHARGE INSTRUCTIONS    Santos Juárez / [id number] : 1950    Admission date: 2018 Discharge date: 2018     Please bring this form with you to show your care provider at your follow-up appointment. Primary care provider:  Yinka Garland MD    Discharging provider:  Dipesh Castellanos MD  - Family Medicine Resident  Dr. Joy Nur  - Attending, Family Medicine     You have been admitted to the hospital with the following diagnoses:    ACUTE DIAGNOSES:  TIA (transient ischemic attack)  . . . . . . . . . . . . . . . . . . . . . . . . . . . . . . . . . . . . . . . . . . . . . . . . . . . . . . . . . . . . . . . . . . . . . . . .     Continue ALL home medications as previously prescribed      FOLLOW-UP CARE RECOMMENDATIONS:    Appointments  Follow-up Information     Follow up With Details Comments Contact Info    Yinka Garland MD Go on 2018 Appointment at 1:15 please arrive 15 minutes early  [address] [facility]  [phone]      Miguelina Lopez MD In 2 weeks  [address] [facility]  [address] [facility]  [phone]           Medication Changes:     1. START Taking 1 pill of Keflex 500 mg 2 times a day (one in the morning and one in the evening) starting on the evening of 2018 until the morning of 2018.  2. INCREASE YOUR ASPIRIN TO 325mg (1 TAB DAILY)  3. INCREASE YOUR LIPITOR TO 40mg (1 TAB DAILY)  4. CONTINUE ALL OTHER MEDICATIONS AS PREVIOUSLY PRESCRIBED. Follow-up tests needed: None    Pending test results: At the time of your discharge the following test results are still pending: Urine Culture. Please make sure you review these results with your outpatient follow-up provider(s). Specific symptoms to watch for: chest pain, shortness of breath, fever, chills, nausea, vomiting, diarrhea, change in mentation, falling, weakness, bleeding.      DIET/what to eat:  Regular Diet    ACTIVITY:  Activity as tolerated    Wound care: none    Equipment needed:  none    What to do if new or unexpected symptoms occur? If you experience any of the above symptoms (or should other concerns or questions arise after discharge) please call your primary care physician. Return to the emergency room if you cannot get hold of your doctor. · It is very important that you keep your follow-up appointment(s). · Please bring discharge papers, medication list (and/or medication bottles) to your follow-up appointments for review by your outpatient provider(s). · Please check the list of medications and be sure it includes every medication (even non-prescription medications) that your provider wants you to take. · It is important that you take the medication exactly as they are prescribed. · Keep your medication in the bottles provided by the pharmacist and keep a list of the medication names, dosages, and times to be taken in your wallet. · Do not take other medications without consulting your doctor. · If you have any questions about your medications or other instructions, please talk to your nurse or care provider before you leave the hospital.     Information obtained by:     I understand that if any problems occur once I am at home I am to contact my physician. These instructions were explained to me and I had the opportunity to ask questions. I understand and acknowledge receipt of the instructions indicated above.                                                                                                                                                Physician's or R.N.'s Signature                                                                  Date/Time                                                                                                                                              Patient or Representative Signature                                                          Date/Time

## 2018-09-13 NOTE — DISCHARGE SUMMARY
[facility] [address] [place] [address]   Office [phone], Fax [phone]        Discharge Summary     Patient: Daphne Moore       MRN: [id number]       YOB: 1950       Age: 75 y.o. Date of admission:  9/12/2018    Date of discharge:  9/13/2018    Primary care provider:  Yuval Osullivan MD     Admitting provider:  Johan Hidalgo MD    Discharging provider(s): Camila Jefferson MD - Family Medicine Resident  Dr. Bladimir Mcmanus MD - [facility] Medicine Attending     Consultations  · Neurology     Procedures  · EEG     Discharge destination: home  The patient is stable for discharge. Admission diagnosis  TIA (transient ischemic attack)      Patient Presentation     Daphne Moore is a 75 y.o. female with dyslipidemia and Hx of TIA (3 years ago) who presents to the ER complaining of lightheadedness and dizziness that started this afternoon and resolved shortly after arrival in the ED. Pt endorsed HA, blurred vision, RUE and RLE weakness and paresthesias along with vertigo. Symptoms started this afternoon while she was working in her garage and lasted for about 2 hours. She was helped to the car by her friend and was brought to the ER. Friend denied slurred speech and facial droop. All of her symptoms resolved after she arrived in the ED and after she was treated with Tylenol and meclizine. Pt states that she had similar symptoms 3 years ago when she was diagnosed with TIA.       At the time of my examination: Pt denies HA, weakness, dizziness/lightheadedness, blurred vision, SOB, CP, Abd pain, N/V/D and dysuria.       In the ER, vital signs were unremarkable: BP: 143/73. CBC, CMP were unremarkable. CT head: neg for acute intracranial abnormality. CXR: was neg for PNA and pulmonary edema.      Pt was treated with tylenol and meclizine in the ED. Conditions treated during this hospitalization:    *CVA/TIA: Symptoms resolved after she arrived in the ED.  Tele neuro was consulted in the ED: recommended admission for MRI brain. CT head neg for acute intracranial abnormality. CXR was neg for PNA and pulmonary edema. RI/MRA (head/neck)- No acute intracranial process. No intracranial mass, hemorrhage or evidence of acute infarction. No aneurysm, dissection or evidence of hemodynamically significant stenosis. Echocardiogram showed no arrhythmia and troponin negative x3. Neurology consulted and EEG preformed showing no clear focal abnormalities or epileptiform activity. Pt ECHO 55 % to 60 %. There were no regional wall motion abnormalities. Pt to increase aspirin to 325 and continue statin. Pt will follow up with Neurology in 2-3 weeks. UTI:   Pt found to have UTI on UA during admission and  started on Keflex 500mg BID for 5 day course, f/u Uclx      Hyperlipidemia: Home med: Atorvastatin 20mg daily             Lab Results   Component Value Date/Time      Cholesterol, total 149 08/13/2018 09:45 AM      HDL Cholesterol 54 08/13/2018 09:45 AM      LDL, calculated 65 08/13/2018 09:45 AM      VLDL, calculated 30 08/13/2018 09:45 AM      Triglyceride 151 (H) 08/13/2018 09:45 AM   - started on Atorvastatin 40mg during hospitalization            Labs/Imaging Needed on follow up: Uclx     Pending test results: At the time of your discharge the following test results are still pending: none. Please make sure you review these results with your outpatient follow-up provider(s). Specific symptoms to watch for: chest pain, shortness of breath, fever, chills, nausea, vomiting, diarrhea, change in mentation, falling, weakness, bleeding. DIET/what to eat:  Regular Diet    ACTIVITY:  Activity as tolerated    Wound care: none    Equipment needed:  none    Follow-up Care:    Follow-up Information     Follow up With Details Comments Contact Info    Hiro Amezcua MD Go on 9/18/2018 Appointment at 1:15 please arrive 15 minutes early  [address] [address], [place] In 2 weeks  [address] [place] [address] Road  [address] [facility]  [phone]            Physical examination at discharge  Visit Vitals    /65 (BP 1 Location: Right arm, BP Patient Position: Sitting)    Pulse 92    Temp 98.2 °F (36.8 °C)    Resp 16    Ht 5' 5\" (1.651 m)    Wt 159 lb 9.8 oz (72.4 kg)    SpO2 97%    BMI 26.56 kg/m2      Physical Examination:   General appearance - alert, well appearing, and in no distress   Mental status - normal mood, behavior, speech, dress, motor activity, and thought processes  Chest - clear to auscultation, no wheezes, rales or rhonchi, symmetric air entry  Heart - normal rate, regular rhythm, normal S1, S2, no murmurs, rubs, clicks or gallops  Abdomen - soft, nontender, nondistended, no masses or organomegaly  Neurological - alert, oriented, normal speech, no focal findings or movement disorder noted  Extremities - peripheral pulses normal, no pedal edema, no clubbing or cyanosis    Recent Results (from the past 24 hour(s))   TROPONIN I    Collection Time: 09/12/18  6:52 PM   Result Value Ref Range    Troponin-I, Qt. <0.05 <0.05 ng/mL   URINALYSIS W/ REFLEX CULTURE    Collection Time: 09/12/18 10:51 PM   Result Value Ref Range    Color YELLOW/STRAW      Appearance CLOUDY (A) CLEAR      Specific gravity 1.006 1.003 - 1.030      pH (UA) 7.0 5.0 - 8.0      Protein NEGATIVE  NEG mg/dL    Glucose NEGATIVE  NEG mg/dL    Ketone NEGATIVE  NEG mg/dL    Bilirubin NEGATIVE  NEG      Blood NEGATIVE  NEG      Urobilinogen 0.2 0.2 - 1.0 EU/dL    Nitrites NEGATIVE  NEG      Leukocyte Esterase NEGATIVE  NEG      WBC 0-4 0 - 4 /hpf    RBC 0-5 0 - 5 /hpf    Epithelial cells FEW FEW /lpf    Bacteria 4+ (A) NEG /hpf    UA:UC IF INDICATED URINE CULTURE ORDERED (A) CNI      Hyaline cast 0-2 0 - 5 /lpf   DRUG SCREEN, URINE    Collection Time: 09/12/18 10:51 PM   Result Value Ref Range    AMPHETAMINES NEGATIVE  NEG      BARBITURATES NEGATIVE  NEG      BENZODIAZEPINES NEGATIVE NEG      COCAINE NEGATIVE  NEG      METHADONE NEGATIVE  NEG      OPIATES NEGATIVE  NEG      PCP(PHENCYCLIDINE) NEGATIVE  NEG      THC (TH-CANNABINOL) NEGATIVE  NEG      Drug screen comment (NOTE)    CBC WITH AUTOMATED DIFF    Collection Time: 09/13/18  3:00 AM   Result Value Ref Range    WBC 10.6 3.6 - 11.0 K/uL    RBC 4.39 3.80 - 5.20 M/uL    HGB 12.9 11.5 - 16.0 g/dL    HCT 38.4 35.0 - 47.0 %    MCV 87.5 80.0 - 99.0 FL    MCH 29.4 26.0 - 34.0 PG    MCHC 33.6 30.0 - 36.5 g/dL    RDW 12.4 11.5 - 14.5 %    PLATELET 294 613 - 042 K/uL    MPV 9.3 8.9 - 12.9 FL    NRBC 0.0 0  WBC    ABSOLUTE NRBC 0.00 0.00 - 0.01 K/uL    NEUTROPHILS 40 32 - 75 %    LYMPHOCYTES 53 (H) 12 - 49 %    MONOCYTES 5 5 - 13 %    EOSINOPHILS 2 0 - 7 %    BASOPHILS 1 0 - 1 %    IMMATURE GRANULOCYTES 0 0.0 - 0.5 %    ABS. NEUTROPHILS 4.2 1.8 - 8.0 K/UL    ABS. LYMPHOCYTES 5.6 (H) 0.8 - 3.5 K/UL    ABS. MONOCYTES 0.6 0.0 - 1.0 K/UL    ABS. EOSINOPHILS 0.2 0.0 - 0.4 K/UL    ABS. BASOPHILS 0.1 0.0 - 0.1 K/UL    ABS. IMM. GRANS. 0.0 0.00 - 0.04 K/UL    DF AUTOMATED     METABOLIC PANEL, COMPREHENSIVE    Collection Time: 09/13/18  3:00 AM   Result Value Ref Range    Sodium 141 136 - 145 mmol/L    Potassium 3.6 3.5 - 5.1 mmol/L    Chloride 106 97 - 108 mmol/L    CO2 27 21 - 32 mmol/L    Anion gap 8 5 - 15 mmol/L    Glucose 147 (H) 65 - 100 mg/dL    BUN 19 6 - 20 MG/DL    Creatinine 0.96 0.55 - 1.02 MG/DL    BUN/Creatinine ratio 20 12 - 20      GFR est AA >60 >60 ml/min/1.73m2    GFR est non-AA 58 (L) >60 ml/min/1.73m2    Calcium 9.1 8.5 - 10.1 MG/DL    Bilirubin, total 0.4 0.2 - 1.0 MG/DL    ALT (SGPT) 21 12 - 78 U/L    AST (SGOT) 15 15 - 37 U/L    Alk.  phosphatase 75 45 - 117 U/L    Protein, total 6.2 (L) 6.4 - 8.2 g/dL    Albumin 3.4 (L) 3.5 - 5.0 g/dL    Globulin 2.8 2.0 - 4.0 g/dL    A-G Ratio 1.2 1.1 - 2.2     TROPONIN I    Collection Time: 09/13/18  3:00 AM   Result Value Ref Range    Troponin-I, Qt. <0.05 <0.05 ng/mL         Current Discharge Medication List      START taking these medications    Details   cephALEXin (KEFLEX) 500 mg capsule Take 1 Cap by mouth two (2) times a day for 4 days. For treatment of UTI. Take from 9/13/2018 starting in the evening, then continue twice a day until 9/19/2018  Qty: 8 Cap, Refills: 0      aspirin (ASPIRIN) 325 mg tablet Take 1 Tab by mouth daily. Qty: 30 Tab, Refills: 0         CONTINUE these medications which have CHANGED    Details   atorvastatin (LIPITOR) 40 mg tablet Take 1 Tab by mouth daily. Qty: 30 Tab, Refills: 0         STOP taking these medications       aspirin delayed-release 81 mg tablet Comments:   Reason for Stopping:               Admission imaging studies:      Results from [place] encounter on 09/12/18   XR CHEST PA LAT   Narrative Indication:  recommended after portable CXR     Exam: PA and lateral views of the chest.    Direct comparison is made to prior CXR dated September 2018. Findings: Cardiomediastinal silhouette is within normal limits. Lungs are clear  bilaterally. Pleural spaces are normal. Osseous structures are intact. Impression IMPRESSION: No acute cardiopulmonary disease. No results found for this or any previous visit. Results from [place] encounter on 09/12/18   CT CODE NEURO HEAD WO CONTRAST   Narrative EXAM:  CT CODE NEURO HEAD WO CONTRAST    INDICATION:   questionable stroke. Headache and right-sided weakness. COMPARISON: None. TECHNIQUE: Unenhanced CT of the head was performed using 5 mm images. Brain and  bone windows were generated. CT dose reduction was achieved through use of a  standardized protocol tailored for this examination and automatic exposure  control for dose modulation. FINDINGS:  The ventricles are normal in size and position. Basilar cisterns are patent. No  midline shift. There is no evidence of acute infarct, hemorrhage, or extraaxial  fluid collection.     The paranasal sinuses, mastoid air cells, and middle ears are clear. The orbital  contents are within normal limits. There are no significant osseous or  extracranial soft tissue lesions. Impression IMPRESSION:  1. No evidence of acute intracranial abnormality.                 No procedure found.      -------------------------------------------------------------------------------------------------------------------    Chronic Diagnoses:    Problem List as of 9/13/2018  Date Reviewed: 8/13/2018          Codes Class Noted - Resolved    * (Principal)TIA (transient ischemic attack) ICD-10-CM: G45.9  ICD-9-CM: 435.9  9/12/2018 - Present        High cholesterol ICD-10-CM: E78.00  ICD-9-CM: 272.0  8/13/2018 - Present        Stroke syndrome [facility]) ICD-10-CM: I63.9  ICD-9-CM: 434.91  4/24/2018 - Present        Advance care planning ICD-10-CM: Z71.89  ICD-9-CM: V65.49  7/27/2017 - Present        Vertigo ICD-10-CM: R42  ICD-9-CM: 780.4  6/13/2017 - Present        RESOLVED: History of CVA (cerebrovascular accident) ICD-10-CM: Z86.73  ICD-9-CM: V12.54  6/13/2017 - 4/24/2018                Signed:      Nicole Fuentes MD   Family Medicine Resident      9/13/2018     Dr. Sabrina Joshua MD   Family Medicine Attending

## 2018-09-13 NOTE — PROGRESS NOTES
Patient Name: Kellee Romero  : 1950  Age: 75 y.o. Ordering physician: No ref. provider found  Date of EE2018  EEG procedure number: [id number]  Diagnosis:  vertigo  Interpreting physician: Jeanette Glez MD      ELECTROENCEPHALOGRAM REPORT     PROCEDURE: EEG. CLINICAL INDICATION: The patient is a 75 y.o. female with a history of   possible seizures. EEG to rule out seizures, rule out stroke, rule out   cortical abnormality. EEG CLASSIFICATION: Essentially normal    DESCRIPTION OF THE RECORD:   The background of this recording contains a posteriorly-located occipital alpha rhythm of 11 Hz that attenuates with eye opening. Throughout the recording, there were no clear areas of focal slowing nor spike or spike-and-wave discharges seen. Hyperventilation was not performed. Photic stimulation produced a minimal driving response in the posterior head regions. During the recording the patient did not achieve stage II sleep    INTERPRETATION: This is a normal electroencephalogram showing no clear focal abnormalities or epileptiform activity. A normal EEG doesn't not rule out seizures. Clinical correlation recommended.       Rodney Multani MD  2018  2:46 PM

## 2018-09-13 NOTE — CDMP QUERY
Please clarify if this patient is (was) being treated/managed for:  
 
=> Pyuria (POA) as evidenced by pt with UA +4+ bacteria, requiring Keflex  
=> Other explanation of clinical findings  
=> Clinically Undetermined (no explanation for clinical findings) The medical record reflects the following clinical findings, treatment, and risk factors. Risk Factors:  Female hx of Cancer Clinical Indicators:  UA Turbid and 4+ bacteria Treatment: Keflex 500mg 2x a day Please clarify and document your clinical opinion in the progress notes and discharge summary including the definitive and/or presumptive diagnosis, (suspected or probable), related to the above clinical findings. Please include clinical findings supporting your diagnosis. Thank you, HORTENCIA Barajas RN, [facility], [facility] 
CDMP

## 2018-09-15 LAB
BACTERIA SPEC CULT: ABNORMAL
CC UR VC: ABNORMAL
SERVICE CMNT-IMP: ABNORMAL

## 2018-09-17 ENCOUNTER — PATIENT OUTREACH (OUTPATIENT)
Dept: FAMILY MEDICINE CLINIC | Age: 68
End: 2018-09-17

## 2018-09-17 NOTE — PROGRESS NOTES
Hospital Discharge Follow-Up      Date/Time:  2018 3:14 PM    Patient was admitted to [facility] on 18 and discharged on 18 for TIA. The physician discharge summary was available at the time of outreach. Patient was contacted within 2 business days of discharge. Top Challenges reviewed with the provider   Urine culture pending at discharge, please review  Patient is still feeling dizzy at home - repeat labs, orthostatics?  following up with neurology 10/4/18         Method of communication with provider :chart routing    Inpatient RRAT score: 15  Was this a readmission? no   Patient stated reason for the readmission: n/a    Nurse Navigator (NN) contacted the patient by telephone to perform post hospital discharge assessment. Verified name and  with patient as identifiers. Provided introduction to self, and explanation of the Nurse Navigator role. Reviewed discharge instructions and red flags with patient who verbalized understanding. Patient given an opportunity to ask questions and does not have any further questions or concerns at this time. The patient agrees to contact the PCP office for questions related to their healthcare. NN provided contact information for future reference. Disease Specific:   N/A    Home Health orders at discharge: [address]: n/a  Date of initial visit: n/a    Durable Medical Equipment ordered/company: n/a  Durable Medical Equipment received: n/a    Barriers to care? none identified    Advance Care Planning:   Does patient have an Advance Directive:  not on file     Medication(s):   New Medications at Discharge: Keflex  Changed Medications at Discharge: Lipitor, Aspirin  Discontinued Medications at Discharge: none    Medication reconciliation was performed with patient, who verbalizes understanding of administration of home medications. There were no barriers to obtaining medications identified at this time.     Referral to Pharm D needed: no     Current Outpatient Prescriptions   Medication Sig    cephALEXin (KEFLEX) 500 mg capsule Take 1 Cap by mouth two (2) times a day for 4 days. For treatment of UTI. Take from 9/13/2018 starting in the evening, then continue twice a day until 9/19/2018    aspirin (ASPIRIN) 325 mg tablet Take 1 Tab by mouth daily.  atorvastatin (LIPITOR) 40 mg tablet Take 1 Tab by mouth daily. No current facility-administered medications for this visit. There are no discontinued medications. [facility] follow up appointment(s):   Future Appointments  Date Time Provider Marsha Mary   9[facility]18[facility]2018 1:15 PM [facility]   10/4/2018 9:00 AM JYOTI [address] 27   2/13/2019 7:15 AM [facility]      Non-[facility] follow up appointment(s): none  [facility]:  information provided as a resource       Goals      Attends follow-up appointments as directed. 9/17/18 - Patient will attend PCP follow-up on 9/18/18 and neurology follow-up on 10/4/18.  BERTO

## 2018-09-18 ENCOUNTER — OFFICE VISIT (OUTPATIENT)
Dept: FAMILY MEDICINE CLINIC | Age: 68
End: 2018-09-18

## 2018-09-18 VITALS
HEART RATE: 82 BPM | BODY MASS INDEX: 26.01 KG/M2 | RESPIRATION RATE: 16 BRPM | TEMPERATURE: 98.2 F | WEIGHT: 156.1 LBS | OXYGEN SATURATION: 95 % | DIASTOLIC BLOOD PRESSURE: 83 MMHG | HEIGHT: 65 IN | SYSTOLIC BLOOD PRESSURE: 132 MMHG

## 2018-09-18 DIAGNOSIS — E78.00 HIGH CHOLESTEROL: ICD-10-CM

## 2018-09-18 DIAGNOSIS — G45.9 TRANSIENT CEREBRAL ISCHEMIA, UNSPECIFIED TYPE: Primary | ICD-10-CM

## 2018-09-18 DIAGNOSIS — I63.49 CEREBROVASCULAR ACCIDENT (CVA) DUE TO EMBOLISM OF OTHER CEREBRAL ARTERY (HCC): ICD-10-CM

## 2018-09-18 RX ORDER — ATORVASTATIN CALCIUM 40 MG/1
40 TABLET, FILM COATED ORAL DAILY
Qty: 30 TAB | Refills: 1 | Status: SHIPPED | OUTPATIENT
Start: 2018-09-18 | End: 2018-10-30 | Stop reason: SDUPTHER

## 2018-09-18 RX ORDER — CLOPIDOGREL BISULFATE 75 MG/1
75 TABLET ORAL DAILY
Qty: 30 TAB | Refills: 11 | Status: SHIPPED | OUTPATIENT
Start: 2018-09-18 | End: 2019-04-03 | Stop reason: SDUPTHER

## 2018-09-18 NOTE — PROGRESS NOTES
Chief Complaint   Patient presents with   [facility] Follow Up     [facility] ED 9/12/18: TIA, UTI-:ASA 325mg and Keflex given    Dizziness    Head Pain    Fatigue     1. Have you been to the ER, urgent care clinic since your last visit? Hospitalized since your last visit? Yes Where: [facility] ED 9/12/18: TIA, UTI-    2. Have you seen or consulted any other health care providers outside of the [address] [place] [address] since your last visit? Include any pap smears or colon screening. No    Feels better after TIA, see DC summary    Chief Complaint   Patient presents with   [facility] Follow Up     [facility] ED 9/12/18: TIA, UTI-:ASA 325mg and Keflex given    Dizziness    Head Pain    Fatigue     She is a 75 y.o. female who presents for evalution. Reviewed PmHx, RxHx, FmHx, SocHx, AllgHx and updated and dated in the chart. Patient Active Problem List    Diagnosis    TIA (transient ischemic attack)    High cholesterol    Stroke syndrome ([facility] [address]    Advance care planning    Vertigo       Review of Systems - negative except as listed above in the HPI    Objective:     Vitals:    09/18/18 1331   BP: 132/83   Pulse: 82   Resp: 16   Temp: 98.2 °F (36.8 °C)   TempSrc: Oral   SpO2: 95%   Weight: 156 lb 1.6 oz (70.8 kg)   Height: 5' 5\" (1.651 m)     Physical Examination: General appearance - alert, well appearing, and in no distress  Chest - clear to auscultation, no wheezes, rales or rhonchi, symmetric air entry  Heart - normal rate, regular rhythm, normal S1, S2, no murmurs, rubs, clicks or gallops      Assessment/ Plan:   Diagnoses and all orders for this visit:    1. Transient cerebral ischemia, unspecified type  -     clopidogrel (PLAVIX) 75 mg tab; Take 1 Tab by mouth daily.  -add rx due to second TIA    2. Cerebrovascular accident (CVA) due to embolism of other cerebral artery (HCC)  -     clopidogrel (PLAVIX) 75 mg tab; Take 1 Tab by mouth daily.     3. High cholesterol  -inc dose from hosp       Follow-up Disposition:  Return in about 6 weeks (around 10/30/2018) for chol check. I have discussed the diagnosis with the patient and the intended plan as seen in the above orders. The patient understands and agrees with the plan. The patient has received an after-visit summary and questions were answered concerning future plans. Medication Side Effects and Warnings were discussed with patient  Patient Labs were reviewed and or requested:  Patient Past Records were reviewed and or requested    Bertrand Wallace M.D. There are no Patient Instructions on file for this visit.

## 2018-09-18 NOTE — MR AVS SNAPSHOT
[address] [address] [facility] [address] 
[phone] Patient: Pam Zuniga MRN: [id number] NI Visit Information Date & Time Provider Department Dept. Phone Encounter #  
 2018  1:15 PM Travis Rowan MD [address] [facility] [phone] [id number] Follow-up Instructions Return in about 6 weeks (around 10/30/2018) for chol check. Your Appointments 10/4/2018  9:00 AM  
HOSPITAL FOLLOW-UP with Nikki Catherine NP  [facility] Appt Note: hosp f/u tia henrik [place]  [facility] [address] [address] [phone]  
  
   
 [place] [address] [address] [address] 2019  7:15 AM  
ESTABLISHED PATIENT with Travis Rowan MD  
[address] [facility]) Appt Note: [address] [address]  
[phone]  
  
   
 [address] [address] [facility] [address] Upcoming Health Maintenance Date Due DTaP/Tdap/Td series (1 - Tdap) 1970 FOBT Q 1 YEAR AGE 50-75 1999 ZOSTER VACCINE AGE 60> 2009 GLAUCOMA SCREENING Q2Y 2014 Bone Densitometry (Dexa) Screening 2014 Pneumococcal 65+ Low/Medium Risk (1 of 2 - PCV13) 2014 Influenza Age 5 to Adult 3/31/2019* MEDICARE YEARLY EXAM 2019 BREAST CANCER SCRN MAMMOGRAM 2020 *Topic was postponed. The date shown is not the original due date. Allergies as of 2018  Review Complete On: 2018 By: Travis Rowan MD  
  
 Severity Noted Reaction Type Reactions Ampicillin  2017    Hives Bactrim [Sulfamethoprim]  2018    Hives Current Immunizations  Never Reviewed No immunizations on file. Not reviewed this visit You Were Diagnosed With   
  
 Codes Comments Transient cerebral ischemia, unspecified type    -  Primary ICD-10-CM: G45.9 ICD-9-CM: 435.9 Cerebrovascular accident (CVA) due to embolism of other cerebral artery (HCC)     ICD-10-CM: I63.49 
ICD-9-CM: 434.11 High cholesterol     ICD-10-CM: E78.00 ICD-9-CM: 272.0 Vitals BP Pulse Temp Resp Height(growth percentile) Weight(growth percentile) 132/83 82 98.2 °F (36.8 °C) (Oral) 16 5' 5\" (1.651 m) 156 lb 1.6 oz (70.8 kg) SpO2 BMI OB Status Smoking Status 95% 25.98 kg/m2 Menopause Never Smoker Vitals History BMI and BSA Data Body Mass Index Body Surface Area  
 25.98 kg/m 2 1.8 m 2 Preferred Pharmacy Pharmacy Name Phone [address] [phone] Your Updated Medication List  
  
   
This list is accurate as of 9/18/18  1:59 PM.  Always use your most recent med list.  
  
  
  
  
 aspirin 325 mg tablet Commonly known as:  ASPIRIN Take 1 Tab by mouth daily. atorvastatin 40 mg tablet Commonly known as:  LIPITOR Take 1 Tab by mouth daily. clopidogrel 75 mg Tab Commonly known as:  PLAVIX Take 1 Tab by mouth daily. Prescriptions Sent to Pharmacy Refills  
 clopidogrel (PLAVIX) 75 mg tab 11 Sig: Take 1 Tab by mouth daily. Class: Normal  
 Pharmacy: [address] #: [phone] Route: Oral  
  
Follow-up Instructions Return in about 6 weeks (around 10/30/2018) for chol check. Introducing hospitals & HEALTH SERVICES! Flores Moreira introduces [employer] patient portal. Now you can access parts of your medical record, email your doctor's office, and request medication refills online. 1. In your internet browser, go to [email or web address] 2. Click on the First Time User? Click Here link in the Sign In box. You will see the New Member Sign Up page. 3. Enter your [employer] Access Code exactly as it appears below. You will not need to use this code after youve completed the sign-up process. If you do not sign up before the expiration date, you must request a new code. · [employer] Access Code: [email or web address] Expires: 10/21/2018  7:46 AM 
 
4. Enter the last four digits of your Social Security Number (xxxx) and Date of Birth (mm/dd/yyyy) as indicated and click Submit. You will be taken to the next sign-up page. 5. Create a [employer] ID. This will be your [employer] login ID and cannot be changed, so think of one that is secure and easy to remember. 6. Create a [employer] password. You can change your password at any time. 7. Enter your Password Reset Question and Answer. This can be used at a later time if you forget your password. 8. Enter your e-mail address. You will receive e-mail notification when new information is available in [address]. 9. Click Sign Up. You can now view and download portions of your medical record. 10. Click the Download Summary menu link to download a portable copy of your medical information. If you have questions, please visit the Frequently Asked Questions section of the [employer] website. Remember, [employer] is NOT to be used for urgent needs. For medical emergencies, dial 911. Now available from your iPhone and Android! Please provide this summary of care documentation to your next provider. Your primary care clinician is listed as HARRIETT MARRERO. If you have any questions after today's visit, please call [phone].

## 2018-09-27 ENCOUNTER — PATIENT OUTREACH (OUTPATIENT)
Dept: FAMILY MEDICINE CLINIC | Age: 68
End: 2018-09-27

## 2018-09-27 NOTE — ACP (ADVANCE CARE PLANNING)
9/27/18 - Honoring Choices information mailed to patient per patient request. Will discuss booking appointment with facilitator at next phone call.  BERTO

## 2018-09-27 NOTE — LETTER
Ms. Mirela Avila, 
 
Per our phone conversation, I've included some information on Advanced Care Planning. Please review and let me know if you would like to book an appointment with our facilitator, Daphne Lopez. I'll talk to you soon! Sincerely, Deb Fernandez, ALENAN, RN 
Nurse Navigator

## 2018-09-27 NOTE — PROGRESS NOTES
Goals      Attends follow-up appointments as directed. 9/17/18 - Patient will attend PCP follow-up on 9/18/18 and neurology follow-up on 10/4/18. BERTO    9/27/18 - Patient attended PCP appointment on 9/18/18. Advised to return in 6 weeks for cholesterol check. Due to follow-up with neurology on 10/4/18. BERTO       Completes ACP            9/27/18 - Discussed ACP. Patient interested in receiving information. Honoring Choices folder mailed. Will discuss booking appointment with facilitator at next call.  BERTO

## 2018-10-04 ENCOUNTER — OFFICE VISIT (OUTPATIENT)
Dept: NEUROLOGY | Age: 68
End: 2018-10-04

## 2018-10-04 VITALS
HEART RATE: 79 BPM | RESPIRATION RATE: 20 BRPM | WEIGHT: 157.5 LBS | SYSTOLIC BLOOD PRESSURE: 124 MMHG | BODY MASS INDEX: 26.24 KG/M2 | DIASTOLIC BLOOD PRESSURE: 80 MMHG | HEIGHT: 65 IN | OXYGEN SATURATION: 98 %

## 2018-10-04 DIAGNOSIS — E78.00 HIGH CHOLESTEROL: ICD-10-CM

## 2018-10-04 DIAGNOSIS — R42 VERTIGO: ICD-10-CM

## 2018-10-04 DIAGNOSIS — G45.9 TIA (TRANSIENT ISCHEMIC ATTACK): Primary | ICD-10-CM

## 2018-10-04 RX ORDER — ASPIRIN 81 MG/1
TABLET ORAL DAILY
COMMUNITY
End: 2019-04-03 | Stop reason: SDUPTHER

## 2018-10-04 NOTE — PROGRESS NOTES
Symptoms- flushed, disoriented, couldn't walk or stand, didn't know where she was   All of those symptoms have since resolved   She came home and sat around for about 2 weeks which about drove her crazy but she is back to doing things around the house   No therapy after being discharged   Nothing new to report since being home

## 2018-10-04 NOTE — PATIENT INSTRUCTIONS

## 2018-10-04 NOTE — PROGRESS NOTES
Date:  10/04/18     Name:  Johana Aaron  :  1950  MRN:  [id number]     PCP:  Sylvester Shepard MD    Chief Complaint   Patient presents with    Follow-up     TIA 2018     HISTORY OF PRESENT ILLNESS:  Follow up for TIA. This was her second TIA. First was about three years ago. She indicates that she felt flushed and disoriented. She was unable to walk or stand. She was confused and was not sure where she was. All of these symptoms have resolved since she was discharged except for some residual dizziness. If she stands too quickly, she will have lightheadedness. This has been present since at least the first TIA three years ago. As part of her workup, she did have an MRI of her brain which failed to demonstrate any acute intracranial process and was normal.  MRA of the brain and neck was without any aneurysm, dissection, or stenosis. Except as noted above, denies  fever, chills, cough. No CP or SOB. No dysuria, loss of bowel or bladder control. No Weight loss. Appetite good. Sleeping well. No sweats. No edema. No bruising or bleeding. No nausea or vomit. No diarrhea. No frequency, urgency, No depressive sxs. No anxiety. Denies sore throat, nasal congestion, nasal discharge, epistaxis, tinnitus, hearing loss, back pain, muscle pain, or joint pain. Current Outpatient Prescriptions   Medication Sig    aspirin delayed-release 81 mg tablet Take  by mouth daily.  clopidogrel (PLAVIX) 75 mg tab Take 1 Tab by mouth daily.  atorvastatin (LIPITOR) 40 mg tablet Take 1 Tab by mouth daily. No current facility-administered medications for this visit.       Allergies   Allergen Reactions    Ampicillin Hives    Bactrim [Sulfamethoprim] Hives     Past Medical History:   Diagnosis Date    Cancer ([facility] [address]     Stroke [facility])      Past Surgical History:   Procedure Laterality Date    BREAST SURGERY PROCEDURE UNLISTED      HX GYN      [address]    [address] fibrodema (left breast)     HX OTHER SURGICAL  1999    ganglin cyst of the right hand      Social History     Social History    Marital status:      Spouse name: N/A    Number of children: N/A    Years of education: N/A     Occupational History    Not on file. Social History Main Topics    Smoking status: Never Smoker    Smokeless tobacco: Never Used    Alcohol use No    Drug use: No    Sexual activity: Not Currently     Other Topics Concern    Not on file     Social History Narrative     Family History   Problem Relation Age of Onset    Cancer Mother        PHYSICAL EXAMINATION:    Visit Vitals    /80    Pulse 79    Resp 20    Ht 5' 5\" (1.651 m)    Wt 71.4 kg (157 lb 8 oz)    SpO2 98%    BMI 26.21 kg/m2     General:  Well defined, nourished, and groomed individual in no acute distress. Neck: Supple, nontender, no bruits, no pain with resistance to active range of motion. Heart: Regular rate and rhythm, no murmurs, rub, or gallop. Normal S1S2. Lungs:  Clear to auscultation bilaterally with equal chest expansion, no cough, no wheeze  Musculoskeletal:  Extremities revealed no edema and had full range of motion of joints. Psych:  Good mood and bright affect    NEUROLOGICAL EXAMINATION:     Mental Status:   Alert and oriented to person, place, and time with recent and remote memory intact. Attention span and concentration are normal. Speech is fluent with a full fund of knowledge. Cranial Nerves:    II, III, IV, VI:  Visual acuity grossly intact. Visual fields are normal.    Pupils are equal, round, and reactive to light and accommodation. Extra-ocular movements are full and fluid. Fundoscopic exam was benign, no ptosis or nystagmus. V-XII: Hearing is grossly intact. Facial features are symmetric, with normal sensation and strength. The palate rises symmetrically and the tongue protrudes midline. Sternocleidomastoids 5/5.       Motor Examination: Normal tone, bulk, and strength, 5/5 muscle strength throughout. Sensory exam:  Normal throughout     Coordination:  Heel-to-shin was smooth and symmetrical bilaterally. Finger to nose and rapid arm movement testing was normal.   No resting or intention tremor    Gait and Station:  Steady while walking. Normal arm swing. No Rhomberg or pronator drift. No muscle wasting or fasiculations noted. Reflexes:  DTRs 2+ throughout. ASSESSMENT AND PLAN    ICD-10-CM ICD-9-CM    1. TIA (transient ischemic attack) G45.9 435.9    2. Vertigo R42 780.4    3. High cholesterol E78.00 272.0      Discussed stroke prevention as well as signs and symptoms of stroke, BE-FAST, and when to call for EMS. Stressed importance of recognition and early intervention. LDL goal less than 70 per secondary stroke guidelines. Her last LDL was 65. Continue with Plavix and low dose ASA. Discussed lifestyle modification and importance of exercise and appropriate diet, weight management, and management of comorbid disease with appropriate follow up visits with primary care and other healthcare providers. She has some intermittent dizziness which has been present for three years since her first TIA like event. She will track these episodes for any associated symptoms but it is not bothersome and is part of her new baseline. Follow up as needed      [facility] [facility].  Laura Hayes

## 2018-10-04 NOTE — MR AVS SNAPSHOT
[address] [place] [address] [address] [facility] [id number] [phone] Patient: Umer Desir MRN: [id number] Visit Information Date & Time Provider Department Dept. Phone Encounter #  
 10/4/2018  9:00 AM Cierra Dunne NP [facility] Neurology [facility] [phone] [id number] Your Appointments 10/30/2018  7:15 AM  
ESTABLISHED PATIENT with Audelia Stewart MD  
[facility] Appt Note: 6wk fuv  
 [address] [address] [address]  
[phone]  
  
   
 [address] [address]  
  
    
 2/13/2019  7:15 AM  
ESTABLISHED PATIENT with Audelia Stewart MD  
[facility] Appt Note: [address] [address] [phone] Upcoming Health Maintenance Date Due DTaP/Tdap/Td series (1 - Tdap) 11/16/1970 Shingrix Vaccine Age 50> (1 of 2) 11/16/1999 FOBT Q 1 YEAR AGE 50-75 11/16/1999 GLAUCOMA SCREENING Q2Y 11/16/2014 Bone Densitometry (Dexa) Screening 11/16/2014 Pneumococcal 65+ Low/Medium Risk (1 of 2 - PCV13) 11/16/2014 Influenza Age 5 to Adult 3/31/2019* MEDICARE YEARLY EXAM 8/14/2019 BREAST CANCER SCRN MAMMOGRAM 4/20/2020 *Topic was postponed. The date shown is not the original due date. Allergies as of 10/4/2018  Review Complete On: 10/4/2018 By: Cierra Dunne NP Severity Noted Reaction Type Reactions Ampicillin  06/13/2017    Hives Bactrim [Sulfamethoprim]  09/12/2018    Hives Current Immunizations  Never Reviewed No immunizations on file. Not reviewed this visit You Were Diagnosed With   
  
 Codes Comments TIA (transient ischemic attack)    -  Primary ICD-10-CM: G45.9 ICD-9-CM: 435.9 Vertigo     ICD-10-CM: G66 ICD-9-CM: 780.4 High cholesterol     ICD-10-CM: E78.00 ICD-9-CM: 272.0 Vitals BP Pulse Resp Height(growth percentile) Weight(growth percentile) SpO2  
 124/80 79 20 5' 5\" (1.651 m) 157 lb 8 oz (71.4 kg) 98% BMI OB Status Smoking Status 26.21 kg/m2 Menopause Never Smoker Vitals History BMI and BSA Data Body Mass Index Body Surface Area  
 26.21 kg/m 2 1.81 m 2 Preferred Pharmacy Pharmacy Name Phone [address] [phone] Your Updated Medication List  
  
   
This list is accurate as of 10/4/18  9:53 AM.  Always use your most recent med list.  
  
  
  
  
 aspirin delayed-release 81 mg tablet Take  by mouth daily. atorvastatin 40 mg tablet Commonly known as:  LIPITOR Take 1 Tab by mouth daily. clopidogrel 75 mg Tab Commonly known as:  PLAVIX Take 1 Tab by mouth daily. Patient Instructions A Healthy Lifestyle: Care Instructions Your Care Instructions A healthy lifestyle can help you feel good, stay at a healthy weight, and have plenty of energy for both work and play. A healthy lifestyle is something you can share with your whole family. A healthy lifestyle also can lower your risk for serious health problems, such as high blood pressure, heart disease, and diabetes. You can follow a few steps listed below to improve your health and the health of your family. Follow-up care is a key part of your treatment and safety. Be sure to make and go to all appointments, and call your doctor if you are having problems. It's also a good idea to know your test results and keep a list of the medicines you take. How can you care for yourself at home? · Do not eat too much sugar, fat, or fast foods. You can still have dessert and treats now and then. The goal is moderation. · Start small to improve your eating habits. Pay attention to portion sizes, drink less juice and soda pop, and eat more fruits and vegetables. ¨ Eat a healthy amount of food. A 3-ounce serving of meat, for example, is about the size of a deck of cards. Fill the rest of your plate with vegetables and whole grains. ¨ Limit the amount of soda and sports drinks you have every day. Drink more water when you are thirsty. ¨ Eat at least 5 servings of fruits and vegetables every day. It may seem like a lot, but it is not hard to reach this goal. A serving or helping is 1 piece of fruit, 1 cup of vegetables, or 2 cups of leafy, raw vegetables. Have an apple or some carrot sticks as an afternoon snack instead of a candy bar. Try to have fruits and/or vegetables at every meal. 
· Make exercise part of your daily routine. You may want to start with simple activities, such as walking, bicycling, or slow swimming. Try to be active 30 to 60 minutes every day. You do not need to do all 30 to 60 minutes all at once. For example, you can exercise 3 times a day for 10 or 20 minutes. Moderate exercise is safe for most people, but it is always a good idea to talk to your doctor before starting an exercise program. 
· Keep moving. Billie Sauce the lawn, work in the garden, or [employer]. Take the stairs instead of the elevator at work. · If you smoke, quit. People who smoke have an increased risk for heart attack, stroke, cancer, and other lung illnesses. Quitting is hard, but there are ways to boost your chance of quitting tobacco for good. ¨ Use nicotine gum, patches, or lozenges. ¨ Ask your doctor about stop-smoking programs and medicines. ¨ Keep trying. In addition to reducing your risk of diseases in the future, you will notice some benefits soon after you stop using tobacco. If you have shortness of breath or asthma symptoms, they will likely get better within a few weeks after you quit. · Limit how much alcohol you drink. Moderate amounts of alcohol (up to 2 drinks a day for men, 1 drink a day for women) are okay.  But drinking too much can lead to liver problems, high blood pressure, and other health problems. Family health If you have a family, there are many things you can do together to improve your health. · Eat meals together as a family as often as possible. · Eat healthy foods. This includes fruits, vegetables, lean meats and dairy, and whole grains. · Include your family in your fitness plan. Most people think of activities such as jogging or tennis as the way to fitness, but there are many ways you and your family can be more active. Anything that makes you breathe hard and gets your heart pumping is exercise. Here are some tips: 
¨ Walk to do errands or to take your child to school or the bus. ¨ Go for a family bike ride after dinner instead of watching TV. Where can you learn more? Go to http://arturo-mckinley.info/. Enter L882 in the search box to learn more about \"A Healthy Lifestyle: Care Instructions. \" Current as of: December 7, 2017 Content Version: 11.8 © 8663-8694 Healthwise, Incorporated. Care instructions adapted under license by [employer] (which disclaims liability or warranty for this information). If you have questions about a medical condition or this instruction, always ask your healthcare professional. [place] [address] any warranty or liability for your use of this information. Introducing [facility]! [employer] introduces Phase III Development patient portal. Now you can access parts of your medical record, email your doctor's office, and request medication refills online. 1. In your internet browser, go to [email or web address]t 2. Click on the First Time User? Click Here link in the Sign In box. You will see the New Member Sign Up page. 3. Enter your Phase III Development Access Code exactly as it appears below. You will not need to use this code after youve completed the sign-up process.  If you do not sign up before the expiration date, you must request a new code. · [employer] Access Code: [email or web address] Expires: 10/21/2018  7:46 AM 
 
4. Enter the last four digits of your Social Security Number (xxxx) and Date of Birth (mm/dd/yyyy) as indicated and click Submit. You will be taken to the next sign-up page. 5. Create a [employer] ID. This will be your [employer] login ID and cannot be changed, so think of one that is secure and easy to remember. 6. Create a [employer] password. You can change your password at any time. 7. Enter your Password Reset Question and Answer. This can be used at a later time if you forget your password. 8. Enter your e-mail address. You will receive e-mail notification when new information is available in [address] 9. Click Sign Up. You can now view and download portions of your medical record. 10. Click the Download Summary menu link to download a portable copy of your medical information. If you have questions, please visit the Frequently Asked Questions section of the [employer] website. Remember, [employer] is NOT to be used for urgent needs. For medical emergencies, dial 911. Now available from your iPhone and Android! Please provide this summary of care documentation to your next provider. Your primary care clinician is listed as HARRIETT MARRERO. If you have any questions after today's visit, please call [phone].

## 2018-10-08 ENCOUNTER — PATIENT OUTREACH (OUTPATIENT)
Dept: FAMILY MEDICINE CLINIC | Age: 68
End: 2018-10-08

## 2018-10-08 NOTE — PROGRESS NOTES
[address]  [phone]  Fax [phone]    Patient Name: Ariel Braden  YOB: 1950     Goal:  Completion of an ACP  10/8/18 - Patient received information by mail. States that she does not want to book an appointment at this time, however, she may want to in the future. Patient will keep the information and call [facility] to book an appointment when she feels ready. BERTO Recorded information in [address]ree [address] 115; resolving Health Promotion and Risk Prevention episode.  IOANAR

## 2018-10-08 NOTE — ACP (ADVANCE CARE PLANNING)
10/8/18 - Patient received information by mail. States that she does not want to book an appointment at this time, however, she may want to in the future. Patient will keep the information and call [facility] to book an appointment when she feels ready.  BERTO

## 2018-10-08 NOTE — PROGRESS NOTES
Goals Addressed             Most Recent     Attends follow-up appointments as directed. On track (10/8/2018)             9/17/18 - Patient will attend PCP follow-up on 9/18/18 and neurology follow-up on 10/4/18. BERTO    9/27/18 - Patient attended PCP appointment on 9/18/18. Advised to return in 6 weeks for cholesterol check. Due to follow-up with neurology on 10/4/18. BERTO    10/8/18 - Attended neuro appointment on 10/4/18. Plan to resolve at next call if no further needs. BERTO       COMPLETED: Completes ACP                9/27/18 - Discussed ACP. Patient interested in receiving information. Honoring Choices folder mailed. Will discuss booking appointment with facilitator at next call. BERTO    10/8/18 - Patient received information by mail. States that she does not want to book an appointment at this time, however, she may want to in the future. Patient will keep the information and call [facility] to book an appointment when she feels ready.  BERTO

## 2018-10-15 ENCOUNTER — PATIENT OUTREACH (OUTPATIENT)
Dept: FAMILY MEDICINE CLINIC | Age: 68
End: 2018-10-15

## 2018-10-15 NOTE — PROGRESS NOTES
Patient has graduated from the Transitions of Care Coordination  program on 10/15/18. Patient's symptoms are stable at this time. Patient/family has the ability to self-manage. Care management goals have been completed at this time. No further nurse navigator follow up scheduled. Goals Addressed             Most Recent     COMPLETED: Attends follow-up appointments as directed. On track (10/8/2018)             9/17/18 - Patient will attend PCP follow-up on 9/18/18 and neurology follow-up on 10/4/18. BERTO    9/27/18 - Patient attended PCP appointment on 9/18/18. Advised to return in 6 weeks for cholesterol check. Due to follow-up with neurology on 10/4/18. BERTO    10/8/18 - Attended neuro appointment on 10/4/18. Plan to resolve at next call if no further needs. BERTO    10/15/18 - Resolving 30 day CHAKA. Patient denies any needs at this time. BERTO            Pt has nurse navigator's contact information for any further questions, concerns, or needs.   Patients upcoming visits:  Future Appointments  Date Time Provider Marsha Mary   10/30/2018 7:15 AM Eliana Tavares MD [facility]   2/13/2019 7:15 AM Eliana Tavares MD [facility] [address]

## 2018-10-30 ENCOUNTER — OFFICE VISIT (OUTPATIENT)
Dept: FAMILY MEDICINE CLINIC | Age: 68
End: 2018-10-30

## 2018-10-30 VITALS
HEIGHT: 65 IN | OXYGEN SATURATION: 98 % | WEIGHT: 158 LBS | SYSTOLIC BLOOD PRESSURE: 129 MMHG | RESPIRATION RATE: 16 BRPM | HEART RATE: 83 BPM | DIASTOLIC BLOOD PRESSURE: 82 MMHG | TEMPERATURE: 98 F | BODY MASS INDEX: 26.33 KG/M2

## 2018-10-30 DIAGNOSIS — G45.9 TIA (TRANSIENT ISCHEMIC ATTACK): ICD-10-CM

## 2018-10-30 DIAGNOSIS — E78.00 HIGH CHOLESTEROL: Primary | ICD-10-CM

## 2018-10-30 RX ORDER — ATORVASTATIN CALCIUM 40 MG/1
40 TABLET, FILM COATED ORAL DAILY
Qty: 90 TAB | Refills: 1 | Status: SHIPPED | OUTPATIENT
Start: 2018-10-30 | End: 2019-04-03 | Stop reason: SDUPTHER

## 2018-10-30 NOTE — PROGRESS NOTES
Chief Complaint   Patient presents with    TIA     [facility] 9/12/18    Anticoagulation     plavix    Labs     fasting     1. Have you been to the ER, urgent care clinic since your last visit? Hospitalized since your last visit? No    2. Have you seen or consulted any other health care providers outside of the [address] [place] [address] since your last visit? Include any pap smears or colon screening. No      Chief Complaint   Patient presents with    TIA     [facility] 9/12/18    Anticoagulation     plavix    Labs     fasting     She is a 75 y.o. female who presents for evalution. Reviewed PmHx, RxHx, FmHx, SocHx, AllgHx and updated and dated in the chart. Patient Active Problem List    Diagnosis    TIA (transient ischemic attack)    High cholesterol    Stroke syndrome    Advance care planning    Vertigo       Review of Systems - negative except as listed above in the HPI    Objective:     Vitals:    10/30/18 0716   BP: 129/82   Pulse: 83   Resp: 16   Temp: 98 °F (36.7 °C)   TempSrc: Oral   SpO2: 98%   Weight: 158 lb (71.7 kg)   Height: 5' 5\" (1.651 m)     Physical Examination: General appearance - alert, well appearing, and in no distress  Chest - clear to auscultation, no wheezes, rales or rhonchi, symmetric air entry  Heart - normal rate, regular rhythm, normal S1, S2, no murmurs, rubs, clicks or gallops    Assessment/ Plan:   Diagnoses and all orders for this visit:    1. High cholesterol  -     LIPID PANEL  -     METABOLIC PANEL, COMPREHENSIVE  -[facility] new rx    2. TIA (transient ischemic attack)  -     LIPID PANEL  -     METABOLIC PANEL, COMPREHENSIVE  -no further sxs       Follow-up Disposition:  Return if symptoms worsen or fail to improve. I have discussed the diagnosis with the patient and the intended plan as seen in the above orders. The patient understands and agrees with the plan. The patient has received an after-visit summary and questions were answered concerning future plans.      Medication Side Effects and Warnings were discussed with patient  Patient Labs were reviewed and or requested:  Patient Past Records were reviewed and or requested    Gege Cabral M.D. There are no Patient Instructions on file for this visit.

## 2018-10-31 LAB
ALBUMIN SERPL-MCNC: 4.2 G/DL (ref 3.6–4.8)
ALBUMIN/GLOB SERPL: 2 {RATIO} (ref 1.2–2.2)
ALP SERPL-CCNC: 77 IU/L (ref 39–117)
ALT SERPL-CCNC: 18 IU/L (ref 0–32)
AST SERPL-CCNC: 15 IU/L (ref 0–40)
BILIRUB SERPL-MCNC: 0.3 MG/DL (ref 0–1.2)
BUN SERPL-MCNC: 16 MG/DL (ref 8–27)
BUN/CREAT SERPL: 19 (ref 12–28)
CALCIUM SERPL-MCNC: 9.4 MG/DL (ref 8.7–10.3)
CHLORIDE SERPL-SCNC: 104 MMOL/L (ref 96–106)
CHOLEST SERPL-MCNC: 145 MG/DL (ref 100–199)
CO2 SERPL-SCNC: 23 MMOL/L (ref 20–29)
CREAT SERPL-MCNC: 0.83 MG/DL (ref 0.57–1)
GLOBULIN SER CALC-MCNC: 2.1 G/DL (ref 1.5–4.5)
GLUCOSE SERPL-MCNC: 93 MG/DL (ref 65–99)
HDLC SERPL-MCNC: 47 MG/DL
INTERPRETATION, 910389: NORMAL
LDLC SERPL CALC-MCNC: 60 MG/DL (ref 0–99)
POTASSIUM SERPL-SCNC: 4.7 MMOL/L (ref 3.5–5.2)
PROT SERPL-MCNC: 6.3 G/DL (ref 6–8.5)
SODIUM SERPL-SCNC: 143 MMOL/L (ref 134–144)
TRIGL SERPL-MCNC: 188 MG/DL (ref 0–149)
VLDLC SERPL CALC-MCNC: 38 MG/DL (ref 5–40)

## 2018-10-31 NOTE — PROGRESS NOTES
After reviewing your labs, I believe they are within normal  limits for your age and let us stay with our current plan of action. If you have any questions, feel free to email thru [facility], or give us   a call back. Eric Trevizo M.D.   Good Help to Those in Need  \"You maybe whatever you resolve to be\"

## 2018-10-31 NOTE — PROGRESS NOTES
A letter was sent to the patient at the address on file, with lab results and Dr. Lory Ahumada recommendations for the patient.

## 2019-02-13 ENCOUNTER — OFFICE VISIT (OUTPATIENT)
Dept: FAMILY MEDICINE CLINIC | Age: 69
End: 2019-02-13

## 2019-02-13 VITALS
SYSTOLIC BLOOD PRESSURE: 121 MMHG | HEART RATE: 88 BPM | OXYGEN SATURATION: 98 % | WEIGHT: 162 LBS | HEIGHT: 65 IN | RESPIRATION RATE: 16 BRPM | DIASTOLIC BLOOD PRESSURE: 82 MMHG | BODY MASS INDEX: 26.99 KG/M2 | TEMPERATURE: 97.8 F

## 2019-02-13 DIAGNOSIS — E78.00 HIGH CHOLESTEROL: ICD-10-CM

## 2019-02-13 DIAGNOSIS — M25.511 ACUTE PAIN OF RIGHT SHOULDER: Primary | ICD-10-CM

## 2019-02-13 DIAGNOSIS — G45.9 TIA (TRANSIENT ISCHEMIC ATTACK): ICD-10-CM

## 2019-02-13 RX ORDER — DICLOFENAC SODIUM 10 MG/G
4 GEL TOPICAL 4 TIMES DAILY
Qty: 100 G | Refills: 1 | Status: SHIPPED | OUTPATIENT
Start: 2019-02-13 | End: 2019-08-19

## 2019-02-13 NOTE — PROGRESS NOTES
Chief Complaint   Patient presents with    TIA     Plavix-6 month f/u     1. Have you been to the ER, urgent care clinic since your last visit? Hospitalized since your last visit? No    2. Have you seen or consulted any other health care providers outside of the [address] [place] [address] since your last visit? Include any pap smears or colon screening. No      Chief Complaint   Patient presents with    TIA     Plavix-6 month f/u     She is a 70 y.o. female who presents for evalution. Reviewed PmHx, RxHx, FmHx, SocHx, AllgHx and updated and dated in the chart. Patient Active Problem List    Diagnosis    TIA (transient ischemic attack)    High cholesterol    Stroke syndrome    Advance care planning    Vertigo    SOB (shortness of breath)    Dizziness       Review of Systems - negative except as listed above in the HPI    Objective:     Vitals:    02/13/19 0714   BP: 121/82   Pulse: 88   Resp: 16   Temp: 97.8 °F (36.6 °C)   TempSrc: Oral   SpO2: 98%   Weight: 162 lb (73.5 kg)   Height: 5' 5\" (1.651 m)     Physical Examination: General appearance - alert, well appearing, and in no distress  Neck - supple, no significant adenopathy  Chest - clear to auscultation, no wheezes, rales or rhonchi, symmetric air entry  Heart - normal rate, regular rhythm, normal S1, S2, no murmurs, rubs, clicks or gallops  Abdomen - soft, nontender, nondistended, no masses or organomegaly  Musculoskeletal - no joint tenderness, deformity or swelling    Assessment/ Plan:   Diagnoses and all orders for this visit:    1. Acute pain of right shoulder  -     diclofenac (VOLTAREN) 1 % gel; Apply 4 g to affected area four (4) times daily. shoulder    2. TIA (transient ischemic attack)  -     LIPID PANEL  -     METABOLIC PANEL, COMPREHENSIVE  -no sxs    3. High cholesterol  -     LIPID PANEL  -     METABOLIC PANEL, COMPREHENSIVE  -[facility] rx       Follow-up Disposition:  Return in about 6 months (around 8/13/2019).     I have discussed the diagnosis with the patient and the intended plan as seen in the above orders. The patient understands and agrees with the plan. The patient has received an after-visit summary and questions were answered concerning future plans. Medication Side Effects and Warnings were discussed with patient  Patient Labs were reviewed and or requested:  Patient Past Records were reviewed and or requested    Genoveva Elias M.D. There are no Patient Instructions on file for this visit.

## 2019-02-14 LAB
ALBUMIN SERPL-MCNC: 4.3 G/DL (ref 3.6–4.8)
ALBUMIN/GLOB SERPL: 2 {RATIO} (ref 1.2–2.2)
ALP SERPL-CCNC: 74 IU/L (ref 39–117)
ALT SERPL-CCNC: 18 IU/L (ref 0–32)
AST SERPL-CCNC: 13 IU/L (ref 0–40)
BILIRUB SERPL-MCNC: 0.4 MG/DL (ref 0–1.2)
BUN SERPL-MCNC: 19 MG/DL (ref 8–27)
BUN/CREAT SERPL: 23 (ref 12–28)
CALCIUM SERPL-MCNC: 9.2 MG/DL (ref 8.7–10.3)
CHLORIDE SERPL-SCNC: 108 MMOL/L (ref 96–106)
CHOLEST SERPL-MCNC: 151 MG/DL (ref 100–199)
CO2 SERPL-SCNC: 20 MMOL/L (ref 20–29)
CREAT SERPL-MCNC: 0.81 MG/DL (ref 0.57–1)
GLOBULIN SER CALC-MCNC: 2.1 G/DL (ref 1.5–4.5)
GLUCOSE SERPL-MCNC: 90 MG/DL (ref 65–99)
HDLC SERPL-MCNC: 59 MG/DL
INTERPRETATION, 910389: NORMAL
LDLC SERPL CALC-MCNC: 71 MG/DL (ref 0–99)
POTASSIUM SERPL-SCNC: 4.6 MMOL/L (ref 3.5–5.2)
PROT SERPL-MCNC: 6.4 G/DL (ref 6–8.5)
SODIUM SERPL-SCNC: 144 MMOL/L (ref 134–144)
TRIGL SERPL-MCNC: 107 MG/DL (ref 0–149)
VLDLC SERPL CALC-MCNC: 21 MG/DL (ref 5–40)

## 2019-02-14 NOTE — PROGRESS NOTES
After reviewing your labs, I believe they are within normal  limits for your age and let us stay with our current plan of action. If you have any questions, feel free to email thru [facility], or give us   a call back. Sheela Scott M.D.   Good Help to Those in Need  \"You maybe whatever you resolve to be\"

## 2019-02-19 NOTE — PROGRESS NOTES
2 pt ID verified. I have reviewed the pt's labs and  the provider's instructions with the pt answering all questions to her satisfaction. Pt expressing understanding. No further comments or questions voiced.

## 2019-03-26 ENCOUNTER — TELEPHONE (OUTPATIENT)
Dept: FAMILY MEDICINE CLINIC | Age: 69
End: 2019-03-26

## 2019-03-26 NOTE — TELEPHONE ENCOUNTER
Patient requesting refill of her generic Valtrix     Rx [facility]     Patient may be reached at [phone]

## 2019-04-03 DIAGNOSIS — I63.49 CEREBROVASCULAR ACCIDENT (CVA) DUE TO EMBOLISM OF OTHER CEREBRAL ARTERY (HCC): ICD-10-CM

## 2019-04-03 DIAGNOSIS — G45.9 TRANSIENT CEREBRAL ISCHEMIA, UNSPECIFIED TYPE: ICD-10-CM

## 2019-04-03 RX ORDER — ATORVASTATIN CALCIUM 40 MG/1
40 TABLET, FILM COATED ORAL DAILY
Qty: 90 TAB | Refills: 1 | Status: SHIPPED | OUTPATIENT
Start: 2019-04-03 | End: 2019-05-13 | Stop reason: SDUPTHER

## 2019-04-03 RX ORDER — ASPIRIN 81 MG/1
81 TABLET ORAL DAILY
Qty: 90 TAB | Refills: 0 | Status: SHIPPED | OUTPATIENT
Start: 2019-04-03 | End: 2019-08-26

## 2019-04-03 RX ORDER — CLOPIDOGREL BISULFATE 75 MG/1
75 TABLET ORAL DAILY
Qty: 30 TAB | Refills: 11 | Status: SHIPPED | OUTPATIENT
Start: 2019-04-03 | End: 2019-10-15

## 2019-04-03 RX ORDER — VALACYCLOVIR HYDROCHLORIDE 500 MG/1
500 TABLET, FILM COATED ORAL 2 TIMES DAILY
Qty: 14 TAB | Refills: 5 | Status: SHIPPED | OUTPATIENT
Start: 2019-04-03 | End: 2019-10-15

## 2019-05-13 RX ORDER — ATORVASTATIN CALCIUM 40 MG/1
40 TABLET, FILM COATED ORAL DAILY
Qty: 90 TAB | Refills: 1 | Status: SHIPPED | OUTPATIENT
Start: 2019-05-13 | End: 2019-10-15

## 2019-05-13 RX ORDER — ATORVASTATIN CALCIUM 40 MG/1
40 TABLET, FILM COATED ORAL DAILY
Qty: 90 TAB | Refills: 1 | Status: CANCELLED | OUTPATIENT
Start: 2019-05-13

## 2019-05-14 RX ORDER — ATORVASTATIN CALCIUM 20 MG/1
TABLET, FILM COATED ORAL DAILY
OUTPATIENT
Start: 2019-05-14

## 2019-08-19 ENCOUNTER — HOSPITAL ENCOUNTER (OUTPATIENT)
Dept: LAB | Age: 69
Discharge: HOME | End: 2019-08-19
Payer: MEDICARE

## 2019-08-19 ENCOUNTER — OFFICE VISIT (OUTPATIENT)
Dept: FAMILY MEDICINE CLINIC | Age: 69
End: 2019-08-19

## 2019-08-19 VITALS
OXYGEN SATURATION: 98 % | HEART RATE: 79 BPM | WEIGHT: 154.8 LBS | BODY MASS INDEX: 25.79 KG/M2 | DIASTOLIC BLOOD PRESSURE: 82 MMHG | HEIGHT: 65 IN | SYSTOLIC BLOOD PRESSURE: 127 MMHG | TEMPERATURE: 97.9 F | RESPIRATION RATE: 16 BRPM

## 2019-08-19 DIAGNOSIS — E78.00 HIGH CHOLESTEROL: ICD-10-CM

## 2019-08-19 DIAGNOSIS — I63.49 CEREBROVASCULAR ACCIDENT (CVA) DUE TO EMBOLISM OF OTHER CEREBRAL ARTERY (HCC): ICD-10-CM

## 2019-08-19 DIAGNOSIS — Z00.00 ROUTINE GENERAL MEDICAL EXAMINATION AT A HEALTH CARE FACILITY: Primary | ICD-10-CM

## 2019-08-19 DIAGNOSIS — R73.9 ELEVATED BLOOD SUGAR: ICD-10-CM

## 2019-08-19 PROCEDURE — 84443 ASSAY THYROID STIM HORMONE: CPT

## 2019-08-19 PROCEDURE — 80053 COMPREHEN METABOLIC PANEL: CPT

## 2019-08-19 PROCEDURE — 83036 HEMOGLOBIN GLYCOSYLATED A1C: CPT

## 2019-08-19 PROCEDURE — 80061 LIPID PANEL: CPT

## 2019-08-19 PROCEDURE — 85025 COMPLETE CBC W/AUTO DIFF WBC: CPT

## 2019-08-19 NOTE — PROGRESS NOTES
Chief Complaint   Patient presents with   [facility] Annual Wellness Visit    Labs     Fasting labs     1. Have you been to the ER, urgent care clinic since your last visit? Hospitalized since your last visit? No    2. Have you seen or consulted any other health care providers outside of the [address] [place] [address] since your last visit? Include any pap smears or colon screening. No      Medicare Wellness Exam:    Chief Complaint   Patient presents with    Annual Wellness Visit    Labs     Fasting labs     she is a 70y.o. year old female who presents for evaluation for their Medicare Wellness Visit. [place] [facility] is completed and assessed=yes  Depression Screen is completed and assessed=yes  Medication list reviewed and adjusted for accuracy=yes  Immunizations reviewed and updated=yes  Health/Preventative Screenings reviewed and updated=yes  ADL Functions reviewed=yes    Patient Active Problem List    Diagnosis    TIA (transient ischemic attack)    High cholesterol    Stroke syndrome    Advance care planning    Vertigo    SOB (shortness of breath)    Dizziness       Reviewed PmHx, RxHx, FmHx, SocHx, AllgHx and updated and dated in the chart. Review of Systems - negative except as listed above in the HPI    Objective:     Vitals:    08/19/19 0831   BP: 127/82   Pulse: 79   Resp: 16   Temp: 97.9 °F (36.6 °C)   TempSrc: Oral   SpO2: 98%   Weight: 154 lb 12.8 oz (70.2 kg)   Height: 5' 5\" (1.651 m)     Physical Examination: General appearance - alert, well appearing, and in no distress  Neck - supple, no significant adenopathy  Chest - clear to auscultation, no wheezes, rales or rhonchi, symmetric air entry  Heart - normal rate, regular rhythm, normal S1, S2, no murmurs, rubs, clicks or gallops  Abdomen - soft, nontender, nondistended, no masses or organomegaly    Assessment/ Plan:   Diagnoses and all orders for this visit:    1. Routine general medical examination at a health care facility  -see below    2. Cerebrovascular accident (CVA) due to embolism of other cerebral artery (HCC)  -     LIPID PANEL  -     METABOLIC PANEL, COMPREHENSIVE  -     CBC WITH AUTOMATED DIFF  -     TSH 3RD GENERATION  -     HEMOGLOBIN A1C WITH EAG  -stable    3. High cholesterol  -     LIPID PANEL  -     METABOLIC PANEL, COMPREHENSIVE  -     CBC WITH AUTOMATED DIFF  -     TSH 3RD GENERATION  -     HEMOGLOBIN A1C WITH EAG    4. Elevated blood sugar  -     METABOLIC PANEL, COMPREHENSIVE  -     HEMOGLOBIN A1C WITH EAG         -Pain evaluation performed in office  -Cognitive Screen performed in office  -Depression Screen, Fall risks (by up and go test)  and ADL functionality were addressed  -Medication list updated and reviewed for any changes   -A comprehensive review of medical issues and a plan was formulated  -End of life planning was addressed with pt   -Health Screenings for preventions were addressed and a plan was formulated  -Shingles Vaccine was recommended  -Discussed with patient cancer risk factors and appropriate screenings for age  -Patient evaluated for colonoscopy and referred if needed per screeing criteria  -Labs from previous visits were discussed with patient   -Discussed with patient diet and exercise and formulated a plan as needed  -An Advanced care plan was developed with the patient.  -Alcohol screening performed and was negative    -    I have discussed the diagnosis with the patient and the intended plan as seen in the above orders. The patient understands and agrees with the plan. The patient has received an after-visit summary and questions were answered concerning future plans. Medication Side Effects and Warnings were discussed with patien  Patient Labs were reviewed and or requested  Patient Past Records were reviewed and or requested    There are no Patient Instructions on file for this visit.       Karl Hill M.D.

## 2019-08-20 LAB
ALBUMIN SERPL-MCNC: 4.8 G/DL (ref 3.6–4.8)
ALBUMIN/GLOB SERPL: 2 {RATIO} (ref 1.2–2.2)
ALP SERPL-CCNC: 80 IU/L (ref 39–117)
ALT SERPL-CCNC: 18 IU/L (ref 0–32)
AST SERPL-CCNC: 15 IU/L (ref 0–40)
BASOPHILS # BLD AUTO: 0 X10E3/UL (ref 0–0.2)
BASOPHILS NFR BLD AUTO: 0 %
BILIRUB SERPL-MCNC: 0.6 MG/DL (ref 0–1.2)
BUN SERPL-MCNC: 15 MG/DL (ref 8–27)
BUN/CREAT SERPL: 21 (ref 12–28)
CALCIUM SERPL-MCNC: 9.8 MG/DL (ref 8.7–10.3)
CHLORIDE SERPL-SCNC: 104 MMOL/L (ref 96–106)
CHOLEST SERPL-MCNC: 145 MG/DL (ref 100–199)
CO2 SERPL-SCNC: 24 MMOL/L (ref 20–29)
CREAT SERPL-MCNC: 0.73 MG/DL (ref 0.57–1)
EOSINOPHIL # BLD AUTO: 0.1 X10E3/UL (ref 0–0.4)
EOSINOPHIL NFR BLD AUTO: 1 %
ERYTHROCYTE [DISTWIDTH] IN BLOOD BY AUTOMATED COUNT: 14.1 % (ref 12.3–15.4)
EST. AVERAGE GLUCOSE BLD GHB EST-MCNC: 117 MG/DL
GLOBULIN SER CALC-MCNC: 2.4 G/DL (ref 1.5–4.5)
GLUCOSE SERPL-MCNC: 88 MG/DL (ref 65–99)
HBA1C MFR BLD: 5.7 % (ref 4.8–5.6)
HCT VFR BLD AUTO: 43.2 % (ref 34–46.6)
HDLC SERPL-MCNC: 54 MG/DL
HGB BLD-MCNC: 14.2 G/DL (ref 11.1–15.9)
IMM GRANULOCYTES # BLD AUTO: 0 X10E3/UL (ref 0–0.1)
IMM GRANULOCYTES NFR BLD AUTO: 0 %
INTERPRETATION, 910389: NORMAL
LDLC SERPL CALC-MCNC: 58 MG/DL (ref 0–99)
LYMPHOCYTES # BLD AUTO: 5.4 X10E3/UL (ref 0.7–3.1)
LYMPHOCYTES NFR BLD AUTO: 47 %
MCH RBC QN AUTO: 29.5 PG (ref 26.6–33)
MCHC RBC AUTO-ENTMCNC: 32.9 G/DL (ref 31.5–35.7)
MCV RBC AUTO: 90 FL (ref 79–97)
MONOCYTES # BLD AUTO: 0.4 X10E3/UL (ref 0.1–0.9)
MONOCYTES NFR BLD AUTO: 3 %
NEUTROPHILS # BLD AUTO: 5.5 X10E3/UL (ref 1.4–7)
NEUTROPHILS NFR BLD AUTO: 49 %
PLATELET # BLD AUTO: 303 X10E3/UL (ref 150–450)
POTASSIUM SERPL-SCNC: 4.5 MMOL/L (ref 3.5–5.2)
PROT SERPL-MCNC: 7.2 G/DL (ref 6–8.5)
RBC # BLD AUTO: 4.82 X10E6/UL (ref 3.77–5.28)
SODIUM SERPL-SCNC: 145 MMOL/L (ref 134–144)
TRIGL SERPL-MCNC: 164 MG/DL (ref 0–149)
TSH SERPL DL<=0.005 MIU/L-ACNC: 2 UIU/ML (ref 0.45–4.5)
VLDLC SERPL CALC-MCNC: 33 MG/DL (ref 5–40)
WBC # BLD AUTO: 11.3 X10E3/UL (ref 3.4–10.8)

## 2019-08-20 NOTE — PROGRESS NOTES
Your labs look good! Let's keep with the plan as we discussed during our visit.        Mandeep Crespo M.D.

## 2019-08-26 ENCOUNTER — OFFICE VISIT (OUTPATIENT)
Dept: FAMILY MEDICINE CLINIC | Age: 69
End: 2019-08-26

## 2019-08-26 VITALS
DIASTOLIC BLOOD PRESSURE: 76 MMHG | BODY MASS INDEX: 25.66 KG/M2 | OXYGEN SATURATION: 97 % | HEIGHT: 65 IN | WEIGHT: 154 LBS | HEART RATE: 80 BPM | SYSTOLIC BLOOD PRESSURE: 126 MMHG | RESPIRATION RATE: 18 BRPM | TEMPERATURE: 98.1 F

## 2019-08-26 DIAGNOSIS — B02.9 HERPES ZOSTER WITHOUT COMPLICATION: Primary | ICD-10-CM

## 2019-08-26 RX ORDER — VALACYCLOVIR HYDROCHLORIDE 1 G/1
1000 TABLET, FILM COATED ORAL 3 TIMES DAILY
Qty: 21 TAB | Refills: 0 | Status: SHIPPED | OUTPATIENT
Start: 2019-08-26 | End: 2019-09-02

## 2019-08-26 RX ORDER — GABAPENTIN 300 MG/1
300 CAPSULE ORAL
Qty: 30 CAP | Refills: 0 | Status: SHIPPED | OUTPATIENT
Start: 2019-08-26 | End: 2019-09-11 | Stop reason: SDUPTHER

## 2019-08-26 NOTE — PROGRESS NOTES
Chief Complaint   Patient presents with    Skin Problem     Patient in presents during walk in clinic with skin problem that was noted on Friday. Skin problem is located on rt side of breast,pt has noted itching and burning sensation. Have not treated with otc. Pt has noted chickenpox in childhood. 1. Have you been to the ER, urgent care clinic since your last visit? Hospitalized since your last visit? No    2. Have you seen or consulted any other health care providers outside of the [address] [place] [address] since your last visit? Include any pap smears or colon screening.  No

## 2019-08-26 NOTE — PATIENT INSTRUCTIONS

## 2019-08-26 NOTE — PROGRESS NOTES
Chief Complaint   Patient presents with    Skin Problem     Patient in presents during walk in clinic with skin problem that was noted on Friday. Skin problem is located on rt side of breast, pt has noted itching and burning sensation. Have not treated with otc. Pt has noted chickenpox in childhood. Denies any drainage. Thinks she received shigles vaccine a few years ago. Pt felt an irritation on the bra line. Burning and itching. Wants to scratch. Denies any known exposure to anything. Reports increased stress levels over the last 6 weeks. Has not needed to take valtrex for her fever blisters. Denies any other concerns at this time. Chief Complaint   Patient presents with    Skin Problem     she is a 70y.o. year old female who presents for evalution. Reviewed PmHx, RxHx, FmHx, SocHx, AllgHx and updated and dated in the chart. Review of Systems - negative except as listed above in the HPI    Objective:     Vitals:    08/26/19 0715   BP: 126/76   Pulse: 80   Resp: 18   Temp: 98.1 °F (36.7 °C)   TempSrc: Oral   SpO2: 97%   Weight: 154 lb (69.9 kg)   Height: 5' 5\" (1.651 m)     Physical Examination: General appearance - alert, well appearing, and in no distress  Mental status - normal mood, behavior  Chest - clear to auscultation, no wheezes, rales or rhonchi, symmetric air entry  Heart - normal rate, regular rhythm, normal S1, S2, no murmurs  Skin - 3 small linear clusters of erythematous vesicular rash present right flank along the bra line that appears consistent with shingles    Assessment/ Plan:   Diagnoses and all orders for this visit:    1. Herpes zoster without complication  -     valACYclovir (VALTREX) 1 gram tablet; Take 1 Tab by mouth three (3) times daily for 7 days.  -     gabapentin (NEURONTIN) 300 mg capsule; Take 1 Cap by mouth three (3) times daily as needed for Pain. Max Daily Amount: 900 mg. Start and complete valtex as directed. PRN neurontin for pain.  Reviewed SES/adrS OF MEDICATION. Enc pt to continue to monitor closely and follow up with any new/worsening sx. Follow-up and Dispositions    · Return if symptoms worsen or fail to improve. I have discussed the diagnosis with the patient and the intended plan as seen in the above orders. The patient has received an after-visit summary and questions were answered concerning future plans. Medication Side Effects and Warnings were discussed with patient: yes  Patient Labs were reviewed and or requested: yes  Patient Past Records were reviewed and or requested  yes  Patient / Caregiver Understanding of treatment plan was verbalized during office visit YES    Albert Narvaez, FNP-C    Patient Instructions          Shingles: Care Instructions  Your Care Instructions    Shingles (herpes zoster) causes pain and a blistered rash. The rash can appear anywhere on the body but will be on only one side of the body, the left or right. It will be in a band, a strip, or a small area. The pain can be very severe. Shingles can also cause tingling or itching in the area of the rash. The blisters scab over after a few days and heal in 2 to 4 weeks. Medicines can help you feel better and may help prevent more serious problems caused by shingles. Shingles is caused by the same virus that causes chickenpox. When you have chickenpox, the virus gets into your nerve roots and stays there (becomes dormant) long after you get over the chickenpox. If the virus becomes active again, it can cause shingles. Follow-up care is a key part of your treatment and safety. Be sure to make and go to all appointments, and call your doctor if you are having problems. It's also a good idea to know your test results and keep a list of the medicines you take. How can you care for yourself at home? · Be safe with medicines. Take your medicines exactly as prescribed. Call your doctor if you think you are having a problem with your medicine.  Antiviral medicine helps you get better faster. · Try not to scratch or pick at the blisters. They will crust over and fall off on their own if you leave them alone. · Put cool, wet cloths on the area to relieve pain and itching. You can also use calamine lotion. Try not to use so much lotion that it cakes and is hard to get off. · Put cornstarch or baking soda on the sores to help dry them out so they heal faster. · Do not use thick ointment, such as petroleum jelly, on the sores. This will keep them from drying and healing. · To help remove loose crusts, soak them in tap water. This can help decrease oozing, and dry and soothe the skin. · Take an over-the-counter pain medicine, such as acetaminophen (Tylenol), ibuprofen (Advil, Motrin), or naproxen (Aleve). Read and follow all instructions on the label. · Avoid close contact with people until the blisters have healed. It is very important for you to avoid contact with anyone who has never had chickenpox or the chickenpox vaccine. Pregnant women, young babies, and anyone else who has a hard time fighting infection (such as someone with HIV, diabetes, or cancer) is especially at risk. When should you call for help? Call your doctor now or seek immediate medical care if:    · You have a new or higher fever.     · You have a severe headache and a stiff neck.     · You lose the ability to think clearly.     · The rash spreads to your forehead, nose, eyes, or eyelids.     · You have eye pain, or your vision gets worse.     · You have new pain in your face, or you cannot move the muscles in your face.     · Blisters spread to new parts of your body.    Watch closely for changes in your health, and be sure to contact your doctor if:    · The rash has not healed after 2 to 4 weeks.     · You still have pain after the rash has healed. Where can you learn more? Go to http://arturo-mckinley.info/.   Oscar Landers in the search box to learn more about \"Shingles: Care Instructions. \"  Current as of: July 30, 2018  Content Version: 12.1  © 3622-3136 [employer], [employer]. Care instructions adapted under license by [employer] (which disclaims liability or warranty for this information). If you have questions about a medical condition or this instruction, always ask your healthcare professional. [place] [address] any warranty or liability for your use of this information.

## 2019-09-11 DIAGNOSIS — B02.9 HERPES ZOSTER WITHOUT COMPLICATION: ICD-10-CM

## 2019-09-11 RX ORDER — GABAPENTIN 300 MG/1
CAPSULE ORAL
Qty: 30 CAP | Refills: 1 | OUTPATIENT
Start: 2019-09-11 | End: 2020-06-01

## 2019-10-15 ENCOUNTER — APPOINTMENT (OUTPATIENT)
Dept: CT IMAGING | Age: 69
End: 2019-10-15
Attending: EMERGENCY MEDICINE
Payer: MEDICARE

## 2019-10-15 ENCOUNTER — HOSPITAL ENCOUNTER (OUTPATIENT)
Age: 69
Setting detail: OBSERVATION
Discharge: HOME | End: 2019-10-16
Attending: EMERGENCY MEDICINE | Admitting: FAMILY MEDICINE
Payer: MEDICARE

## 2019-10-15 DIAGNOSIS — R29.90 STROKE-LIKE SYMPTOMS: Primary | ICD-10-CM

## 2019-10-15 PROBLEM — F05 ACUTE CONFUSIONAL STATE: Status: ACTIVE | Noted: 2019-10-15

## 2019-10-15 PROBLEM — R56.9 SEIZURE (HCC): Status: ACTIVE | Noted: 2019-10-15

## 2019-10-15 LAB
ALBUMIN SERPL-MCNC: 4 G/DL (ref 3.5–5)
ALBUMIN/GLOB SERPL: 1.3 {RATIO} (ref 1.1–2.2)
ALP SERPL-CCNC: 83 U/L (ref 45–117)
ALT SERPL-CCNC: 24 U/L (ref 12–78)
ANION GAP SERPL CALC-SCNC: 8 MMOL/L (ref 5–15)
APTT PPP: 23.9 SEC (ref 22.1–32)
AST SERPL-CCNC: 22 U/L (ref 15–37)
BASOPHILS # BLD: 0.1 K/UL (ref 0–0.1)
BASOPHILS NFR BLD: 1 % (ref 0–1)
BILIRUB SERPL-MCNC: 0.5 MG/DL (ref 0.2–1)
BUN SERPL-MCNC: 12 MG/DL (ref 6–20)
BUN/CREAT SERPL: 14 (ref 12–20)
CALCIUM SERPL-MCNC: 8.7 MG/DL (ref 8.5–10.1)
CHLORIDE SERPL-SCNC: 112 MMOL/L (ref 97–108)
CO2 SERPL-SCNC: 21 MMOL/L (ref 21–32)
COMMENT, HOLDF: NORMAL
CREAT SERPL-MCNC: 0.83 MG/DL (ref 0.55–1.02)
DIFFERENTIAL METHOD BLD: ABNORMAL
EOSINOPHIL # BLD: 0.1 K/UL (ref 0–0.4)
EOSINOPHIL NFR BLD: 1 % (ref 0–7)
ERYTHROCYTE [DISTWIDTH] IN BLOOD BY AUTOMATED COUNT: 12.8 % (ref 11.5–14.5)
GLOBULIN SER CALC-MCNC: 3 G/DL (ref 2–4)
GLUCOSE SERPL-MCNC: 90 MG/DL (ref 65–100)
HCT VFR BLD AUTO: 39.6 % (ref 35–47)
HGB BLD-MCNC: 13.2 G/DL (ref 11.5–16)
IMM GRANULOCYTES # BLD AUTO: 0 K/UL (ref 0–0.04)
IMM GRANULOCYTES NFR BLD AUTO: 0 % (ref 0–0.5)
INR PPP: 1 (ref 0.9–1.1)
LYMPHOCYTES # BLD: 6.3 K/UL (ref 0.8–3.5)
LYMPHOCYTES NFR BLD: 52 % (ref 12–49)
MCH RBC QN AUTO: 29.1 PG (ref 26–34)
MCHC RBC AUTO-ENTMCNC: 33.3 G/DL (ref 30–36.5)
MCV RBC AUTO: 87.4 FL (ref 80–99)
MONOCYTES # BLD: 0.6 K/UL (ref 0–1)
MONOCYTES NFR BLD: 5 % (ref 5–13)
NEUTS SEG # BLD: 5 K/UL (ref 1.8–8)
NEUTS SEG NFR BLD: 41 % (ref 32–75)
NRBC # BLD: 0 K/UL (ref 0–0.01)
NRBC BLD-RTO: 0 PER 100 WBC
PLATELET # BLD AUTO: 283 K/UL (ref 150–400)
PMV BLD AUTO: 9.4 FL (ref 8.9–12.9)
POTASSIUM SERPL-SCNC: 4.9 MMOL/L (ref 3.5–5.1)
PROT SERPL-MCNC: 7 G/DL (ref 6.4–8.2)
PROTHROMBIN TIME: 9.8 SEC (ref 9–11.1)
RBC # BLD AUTO: 4.53 M/UL (ref 3.8–5.2)
RBC MORPH BLD: ABNORMAL
SAMPLES BEING HELD,HOLD: NORMAL
SODIUM SERPL-SCNC: 141 MMOL/L (ref 136–145)
THERAPEUTIC RANGE,PTTT: NORMAL SECS (ref 58–77)
WBC # BLD AUTO: 12.1 K/UL (ref 3.6–11)

## 2019-10-15 PROCEDURE — 96365 THER/PROPH/DIAG IV INF INIT: CPT

## 2019-10-15 PROCEDURE — 80053 COMPREHEN METABOLIC PANEL: CPT

## 2019-10-15 PROCEDURE — 70496 CT ANGIOGRAPHY HEAD: CPT

## 2019-10-15 PROCEDURE — 85025 COMPLETE CBC W/AUTO DIFF WBC: CPT

## 2019-10-15 PROCEDURE — 85610 PROTHROMBIN TIME: CPT

## 2019-10-15 PROCEDURE — 74011250637 HC RX REV CODE- 250/637: Performed by: FAMILY MEDICINE

## 2019-10-15 PROCEDURE — 70450 CT HEAD/BRAIN W/O DYE: CPT

## 2019-10-15 PROCEDURE — 85730 THROMBOPLASTIN TIME PARTIAL: CPT

## 2019-10-15 PROCEDURE — 74011250636 HC RX REV CODE- 250/636: Performed by: EMERGENCY MEDICINE

## 2019-10-15 PROCEDURE — 99285 EMERGENCY DEPT VISIT HI MDM: CPT

## 2019-10-15 PROCEDURE — 99218 HC RM OBSERVATION: CPT

## 2019-10-15 PROCEDURE — 93005 ELECTROCARDIOGRAM TRACING: CPT

## 2019-10-15 PROCEDURE — 36415 COLL VENOUS BLD VENIPUNCTURE: CPT

## 2019-10-15 PROCEDURE — 96374 THER/PROPH/DIAG INJ IV PUSH: CPT

## 2019-10-15 PROCEDURE — 74011000258 HC RX REV CODE- 258: Performed by: EMERGENCY MEDICINE

## 2019-10-15 PROCEDURE — 74011636320 HC RX REV CODE- 636/320: Performed by: RADIOLOGY

## 2019-10-15 RX ORDER — ACETAMINOPHEN 650 MG/1
650 SUPPOSITORY RECTAL
Status: DISCONTINUED | OUTPATIENT
Start: 2019-10-15 | End: 2019-10-16 | Stop reason: HOSPADM

## 2019-10-15 RX ORDER — ACETAMINOPHEN 325 MG/1
650 TABLET ORAL
Status: DISCONTINUED | OUTPATIENT
Start: 2019-10-15 | End: 2019-10-16 | Stop reason: HOSPADM

## 2019-10-15 RX ORDER — VALACYCLOVIR HYDROCHLORIDE 500 MG/1
500 TABLET, FILM COATED ORAL 2 TIMES DAILY
Status: DISCONTINUED | OUTPATIENT
Start: 2019-10-16 | End: 2019-10-15

## 2019-10-15 RX ORDER — CLOPIDOGREL BISULFATE 75 MG/1
75 TABLET ORAL DAILY
Status: DISCONTINUED | OUTPATIENT
Start: 2019-10-16 | End: 2019-10-16 | Stop reason: HOSPADM

## 2019-10-15 RX ORDER — LEVETIRACETAM 5 MG/ML
500 INJECTION INTRAVASCULAR EVERY 12 HOURS
Status: DISCONTINUED | OUTPATIENT
Start: 2019-10-16 | End: 2019-10-16

## 2019-10-15 RX ORDER — ATORVASTATIN CALCIUM 20 MG/1
40 TABLET, FILM COATED ORAL DAILY
Status: DISCONTINUED | OUTPATIENT
Start: 2019-10-15 | End: 2019-10-16 | Stop reason: HOSPADM

## 2019-10-15 RX ORDER — GABAPENTIN 300 MG/1
300 CAPSULE ORAL 3 TIMES DAILY
Status: DISCONTINUED | OUTPATIENT
Start: 2019-10-15 | End: 2019-10-16

## 2019-10-15 RX ORDER — ATORVASTATIN CALCIUM 40 MG/1
40 TABLET, FILM COATED ORAL
COMMUNITY
End: 2020-06-23 | Stop reason: SDUPTHER

## 2019-10-15 RX ORDER — CLOPIDOGREL BISULFATE 75 MG/1
75 TABLET ORAL
COMMUNITY
End: 2020-04-22 | Stop reason: SDUPTHER

## 2019-10-15 RX ADMIN — LEVETIRACETAM 1000 MG: 100 INJECTION, SOLUTION INTRAVENOUS at 20:05

## 2019-10-15 RX ADMIN — IOPAMIDOL 100 ML: 755 INJECTION, SOLUTION INTRAVENOUS at 19:32

## 2019-10-15 RX ADMIN — GABAPENTIN 300 MG: 300 CAPSULE ORAL at 22:24

## 2019-10-15 RX ADMIN — ATORVASTATIN CALCIUM 40 MG: 20 TABLET, FILM COATED ORAL at 22:24

## 2019-10-15 NOTE — ED TRIAGE NOTES
Pt arrives via EMS for possible code stroke. Signs and symptoms: Slurred speech, AMS, unable to walk or write. VAN score: Negative  Last Known Well: 7195  Blood Glucose (EMS acceptable): 84  Blood Pressure (EMS acceptable): 167/87  Anticoagulants: Unknown per EMS. Pt taken straight to CT on arrival by EMS.

## 2019-10-15 NOTE — ED PROVIDER NOTES
70 y.o. female with past medical history significant for TIA, CLL, stroke, and cancer who presents from EMS with chief complaint of neurologic problem. Pt arrives to the ED with c/o slurred speech, altered mental status, and right sided extremity numbness, which began approximately 1 hour ago. EMS reports confusion and notes Pt is disoriented. Per EMS, Pt is unable to walk or write with her right hand. Per EMS, Pt's initial BG was 84. Per EMS, Pt's BP was 167/87. Pt is anticoagulated on Plavix. There are no other acute medical concerns at this time. PCP: Mariella Claude, MD    Full history, physical exam, and ROS unable to be obtained due to: Altered mental status. Note written by Lisa Armas, as dictated by Mayuri Newell MD 6:30 PM          The history is provided by the patient and the EMS personnel. No  was used.         Past Medical History:   Diagnosis Date    [facility]     [facility]LL [facility]ch[facility]ocytic leukemia) [facility]     Stroke ([facility]     TIA (transient ischemic attack) 9/2015       Past Surgical History:   Procedure Laterality Date    BREAST SURGERY PROCEDURE UNLISTED      HX GYN  2004    [address]    HX OTHER SURGICAL  1998    ganglion cyst removed- wrist R    HX OTHER SURGICAL  1998    breast surgery    HX OTHER SURGICAL  1990    fibrodema (left breast)     HX OTHER SURGICAL  1999    ganglin cyst of the right hand          Family History:   Problem Relation Age of Onset    Cancer Mother         renal, bladder       Social History     Socioeconomic History    Marital status:      Spouse name: Not on file    Number of children: Not on file    Years of education: Not on file    Highest education level: Not on file   Occupational History    Not on file   Social Needs    Financial resource strain: Not on file    Food insecurity:     Worry: Not on file     Inability: Not on file    Transportation needs:     Medical: Not on file     Non-medical: Not on file Tobacco Use    Smoking status: Never Smoker    Smokeless tobacco: Never Used   Substance and Sexual Activity    Alcohol use: No    Drug use: No    Sexual activity: Not Currently   Lifestyle    Physical activity:     Days per week: Not on file     Minutes per session: Not on file    Stress: Not on file   Relationships    Social connections:     Talks on phone: Not on file     Gets together: Not on file     Attends Rastafarian service: Not on file     Active member of club or organization: Not on file     Attends meetings of clubs or organizations: Not on file     Relationship status: Not on file    Intimate partner violence:     Fear of current or ex partner: Not on file     Emotionally abused: Not on file     Physically abused: Not on file     Forced sexual activity: Not on file   Other Topics Concern    Not on file   Social History Narrative    ** Merged History Encounter **              ALLERGIES: Ampicillin; Bacitracin; and Bactrim [sulfamethoprim]    Review of Systems   Unable to perform ROS: Mental status change   Neurological: Positive for speech difficulty and numbness. Psychiatric/Behavioral: Positive for confusion. Vitals:    10/15/19 1843   BP: 149/86   Pulse: 78   Resp: 20   Temp: 97.9 °F (36.6 °C)   SpO2: 96%   Weight: 73.1 kg (161 lb 4 oz)   Height: 5' 5\" (1.651 m)            Physical Exam   Vital signs reviewed. Nursing notes reviewed. Const:  No acute distress, well developed, well nourished  Head:  Atraumatic, normocephalic  Eyes:  PERRL, conjunctiva normal, no scleral icterus  Neck:  Supple, trachea midline  Cardiovascular:  RRR, no murmurs, no gallops, no rubs  Resp:  No resp distress, no increased work of breathing, no wheezes, no rhonchi, no rales,  Abd:  Soft, non-tender, non-distended, no rebound, no guarding, no CVA tenderness  MSK:  No pedal edema, normal ROM  Neuro:  Alert and oriented x3, no cranial nerve defect, Tremor of the upper and lower extremity.   Equal strength in upper and lower extremities. No pronator drift. Stuttering speech. Mildly slurred words. Skin:  Warm, dry, intact  Psych: normal mood and affect, behavior is normal, judgement and thought content is normal  Note written by Lisa Rodríguez, as dictated by Kaylynn Gomez MD 6:30 PM      MDM  Number of Diagnoses or Management Options  Stroke-like symptoms:      Amount and/or Complexity of Data Reviewed  Clinical lab tests: ordered and reviewed  Tests in the radiology section of CPT®: ordered and reviewed  Review and summarize past medical records: yes    Patient Progress  Patient progress: stable         Procedures    CONSULT NOTE:  7:26 PM Kaylynn Gomez MD spoke with Dr. Kala Anderson, Consult for Tele-neurology. Discussed available diagnostic tests and clinical findings. Dr. Kala Anderson recommends starting keppra and admission. Mrs. Peyton Jeff is a 50-year-old female who presents to the ER with symptoms consistent with a possible TIA. No mass or bleed on head CT. In the ER her symptoms resolved. Concern for TIA versus partial complex seizure versus ? ???  Patient seen by telemetry neurology. No TPA was recommended.   Patient to be evaluated for admission by the hospitalist.

## 2019-10-16 ENCOUNTER — HOSPITAL ENCOUNTER (OUTPATIENT)
Dept: MRI IMAGING | Age: 69
Discharge: HOME | End: 2019-10-16
Attending: FAMILY MEDICINE
Payer: MEDICARE

## 2019-10-16 ENCOUNTER — APPOINTMENT (OUTPATIENT)
Dept: NON INVASIVE DIAGNOSTICS | Age: 69
End: 2019-10-16
Attending: FAMILY MEDICINE
Payer: MEDICARE

## 2019-10-16 VITALS
DIASTOLIC BLOOD PRESSURE: 75 MMHG | BODY MASS INDEX: 26.82 KG/M2 | SYSTOLIC BLOOD PRESSURE: 135 MMHG | TEMPERATURE: 97.9 F | WEIGHT: 161 LBS | HEIGHT: 65 IN | OXYGEN SATURATION: 96 % | HEART RATE: 78 BPM | RESPIRATION RATE: 16 BRPM

## 2019-10-16 LAB
ANION GAP SERPL CALC-SCNC: 6 MMOL/L (ref 5–15)
ANION GAP SERPL CALC-SCNC: 6 MMOL/L (ref 5–15)
ATRIAL RATE: 87 BPM
BUN SERPL-MCNC: 10 MG/DL (ref 6–20)
BUN SERPL-MCNC: 10 MG/DL (ref 6–20)
BUN/CREAT SERPL: 12 (ref 12–20)
BUN/CREAT SERPL: 13 (ref 12–20)
CALCIUM SERPL-MCNC: 8.6 MG/DL (ref 8.5–10.1)
CALCIUM SERPL-MCNC: 8.8 MG/DL (ref 8.5–10.1)
CALCULATED P AXIS, ECG09: 45 DEGREES
CALCULATED R AXIS, ECG10: -4 DEGREES
CALCULATED T AXIS, ECG11: 4 DEGREES
CHLORIDE SERPL-SCNC: 109 MMOL/L (ref 97–108)
CHLORIDE SERPL-SCNC: 110 MMOL/L (ref 97–108)
CHOLEST SERPL-MCNC: 125 MG/DL
CO2 SERPL-SCNC: 27 MMOL/L (ref 21–32)
CO2 SERPL-SCNC: 27 MMOL/L (ref 21–32)
CREAT SERPL-MCNC: 0.77 MG/DL (ref 0.55–1.02)
CREAT SERPL-MCNC: 0.86 MG/DL (ref 0.55–1.02)
DIAGNOSIS, 93000: NORMAL
ECHO AO ROOT DIAM: 3.43 CM
ECHO AV AREA PEAK VELOCITY: 2.4 CM2
ECHO AV PEAK GRADIENT: 3.3 MMHG
ECHO AV PEAK VELOCITY: 91.47 CM/S
ECHO AV REGURGITANT PHT: 769.3 CM
ECHO EST RA PRESSURE: 3 MMHG
ECHO LA MAJOR AXIS: 2.55 CM
ECHO LA TO AORTIC ROOT RATIO: 0.75
ECHO LA VOL 2C: 54 ML (ref 22–52)
ECHO LA VOL 4C: 46.46 ML (ref 22–52)
ECHO LA VOL BP: 52.72 ML (ref 22–52)
ECHO LA VOL/BSA BIPLANE: 29.23 ML/M2 (ref 16–28)
ECHO LA VOLUME INDEX A2C: 29.93 ML/M2 (ref 16–28)
ECHO LA VOLUME INDEX A4C: 25.76 ML/M2 (ref 16–28)
ECHO LV INTERNAL DIMENSION DIASTOLIC: 5.4 CM (ref 3.9–5.3)
ECHO LV INTERNAL DIMENSION SYSTOLIC: 3.58 CM
ECHO LV IVSD: 0.71 CM (ref 0.6–0.9)
ECHO LV MASS 2D: 167 G (ref 67–162)
ECHO LV MASS INDEX 2D: 92.6 G/M2 (ref 43–95)
ECHO LV POSTERIOR WALL DIASTOLIC: 0.81 CM (ref 0.6–0.9)
ECHO LVOT DIAM: 2.02 CM
ECHO LVOT PEAK GRADIENT: 1.8 MMHG
ECHO LVOT PEAK VELOCITY: 67.8 CM/S
ECHO MV A VELOCITY: 95.39 CM/S
ECHO MV E DECELERATION TIME (DT): 221.3 MS
ECHO MV E VELOCITY: 69.52 CM/S
ECHO MV E/A RATIO: 0.73
ECHO MV REGURGITANT PEAK GRADIENT: 37.5 MMHG
ECHO MV REGURGITANT PEAK VELOCITY: 306.21 CM/S
ECHO PULMONARY ARTERY SYSTOLIC PRESSURE (PASP): 27.8 MMHG
ECHO PV MAX VELOCITY: 49.04 CM/S
ECHO PV PEAK GRADIENT: 1 MMHG
ECHO RIGHT VENTRICULAR SYSTOLIC PRESSURE (RVSP): 27.8 MMHG
ECHO RV INTERNAL DIMENSION: 3.01 CM
ECHO TV REGURGITANT MAX VELOCITY: 248.84 CM/S
ECHO TV REGURGITANT PEAK GRADIENT: 24.8 MMHG
ERYTHROCYTE [DISTWIDTH] IN BLOOD BY AUTOMATED COUNT: 12.9 % (ref 11.5–14.5)
ERYTHROCYTE [DISTWIDTH] IN BLOOD BY AUTOMATED COUNT: 12.9 % (ref 11.5–14.5)
EST. AVERAGE GLUCOSE BLD GHB EST-MCNC: 120 MG/DL
GLUCOSE SERPL-MCNC: 124 MG/DL (ref 65–100)
GLUCOSE SERPL-MCNC: 95 MG/DL (ref 65–100)
HBA1C MFR BLD: 5.8 % (ref 4.2–6.3)
HCT VFR BLD AUTO: 38.3 % (ref 35–47)
HCT VFR BLD AUTO: 41.7 % (ref 35–47)
HDLC SERPL-MCNC: 51 MG/DL
HDLC SERPL: 2.5 {RATIO} (ref 0–5)
HGB BLD-MCNC: 12.7 G/DL (ref 11.5–16)
HGB BLD-MCNC: 13.8 G/DL (ref 11.5–16)
LDLC SERPL CALC-MCNC: 50.8 MG/DL (ref 0–100)
LIPID PROFILE,FLP: NORMAL
MCH RBC QN AUTO: 29.1 PG (ref 26–34)
MCH RBC QN AUTO: 29.3 PG (ref 26–34)
MCHC RBC AUTO-ENTMCNC: 33.1 G/DL (ref 30–36.5)
MCHC RBC AUTO-ENTMCNC: 33.2 G/DL (ref 30–36.5)
MCV RBC AUTO: 88 FL (ref 80–99)
MCV RBC AUTO: 88.2 FL (ref 80–99)
NRBC # BLD: 0 K/UL (ref 0–0.01)
NRBC # BLD: 0 K/UL (ref 0–0.01)
NRBC BLD-RTO: 0 PER 100 WBC
NRBC BLD-RTO: 0 PER 100 WBC
P-R INTERVAL, ECG05: 144 MS
PISA AR MAX VEL: 223.45 CM/S
PLATELET # BLD AUTO: 251 K/UL (ref 150–400)
PLATELET # BLD AUTO: 286 K/UL (ref 150–400)
PMV BLD AUTO: 9.1 FL (ref 8.9–12.9)
PMV BLD AUTO: 9.3 FL (ref 8.9–12.9)
POTASSIUM SERPL-SCNC: 3.6 MMOL/L (ref 3.5–5.1)
POTASSIUM SERPL-SCNC: 3.9 MMOL/L (ref 3.5–5.1)
Q-T INTERVAL, ECG07: 388 MS
QRS DURATION, ECG06: 78 MS
QTC CALCULATION (BEZET), ECG08: 466 MS
RBC # BLD AUTO: 4.34 M/UL (ref 3.8–5.2)
RBC # BLD AUTO: 4.74 M/UL (ref 3.8–5.2)
SODIUM SERPL-SCNC: 142 MMOL/L (ref 136–145)
SODIUM SERPL-SCNC: 143 MMOL/L (ref 136–145)
TRIGL SERPL-MCNC: 116 MG/DL (ref ?–150)
TROPONIN I SERPL-MCNC: <0.05 NG/ML
VENTRICULAR RATE, ECG03: 87 BPM
VLDLC SERPL CALC-MCNC: 23.2 MG/DL
WBC # BLD AUTO: 10.7 K/UL (ref 3.6–11)
WBC # BLD AUTO: 10.8 K/UL (ref 3.6–11)

## 2019-10-16 PROCEDURE — 74011250637 HC RX REV CODE- 250/637: Performed by: FAMILY MEDICINE

## 2019-10-16 PROCEDURE — 70551 MRI BRAIN STEM W/O DYE: CPT

## 2019-10-16 PROCEDURE — 84484 ASSAY OF TROPONIN QUANT: CPT

## 2019-10-16 PROCEDURE — 93306 TTE W/DOPPLER COMPLETE: CPT

## 2019-10-16 PROCEDURE — 83036 HEMOGLOBIN GLYCOSYLATED A1C: CPT

## 2019-10-16 PROCEDURE — 80061 LIPID PANEL: CPT

## 2019-10-16 PROCEDURE — 92523 SPEECH SOUND LANG COMPREHEN: CPT

## 2019-10-16 PROCEDURE — 97535 SELF CARE MNGMENT TRAINING: CPT

## 2019-10-16 PROCEDURE — 80048 BASIC METABOLIC PNL TOTAL CA: CPT

## 2019-10-16 PROCEDURE — 85027 COMPLETE CBC AUTOMATED: CPT

## 2019-10-16 PROCEDURE — 74011250636 HC RX REV CODE- 250/636: Performed by: FAMILY MEDICINE

## 2019-10-16 PROCEDURE — 97530 THERAPEUTIC ACTIVITIES: CPT

## 2019-10-16 PROCEDURE — 97116 GAIT TRAINING THERAPY: CPT

## 2019-10-16 PROCEDURE — 97165 OT EVAL LOW COMPLEX 30 MIN: CPT

## 2019-10-16 PROCEDURE — 97161 PT EVAL LOW COMPLEX 20 MIN: CPT

## 2019-10-16 PROCEDURE — 99218 HC RM OBSERVATION: CPT

## 2019-10-16 PROCEDURE — 36415 COLL VENOUS BLD VENIPUNCTURE: CPT

## 2019-10-16 PROCEDURE — 96376 TX/PRO/DX INJ SAME DRUG ADON: CPT

## 2019-10-16 RX ORDER — GUAIFENESIN 100 MG/5ML
81 LIQUID (ML) ORAL DAILY
Qty: 30 TAB | Refills: 0 | Status: SHIPPED | OUTPATIENT
Start: 2019-10-16

## 2019-10-16 RX ADMIN — LEVETIRACETAM 500 MG: 5 INJECTION INTRAVENOUS at 12:34

## 2019-10-16 RX ADMIN — CLOPIDOGREL BISULFATE 75 MG: 75 TABLET ORAL at 08:57

## 2019-10-16 RX ADMIN — LEVETIRACETAM 500 MG: 5 INJECTION INTRAVENOUS at 00:36

## 2019-10-16 NOTE — ROUTINE PROCESS
TRANSFER - IN REPORT:    Verbal report received from [place], RN(name) on Dinesh  being received from ED(unit) for routine progression of care      Report consisted of patients Situation, Background, Assessment and   Recommendations(SBAR). Information from the following report(s) SBAR, Kardex, Intake/Output, Recent Results and Quality Measures was reviewed with the receiving nurse. Opportunity for questions and clarification was provided. Assessment completed upon patients arrival to unit and care assumed.

## 2019-10-16 NOTE — ROUTINE PROCESS
Bedside and Verbal shift change report given to Soumya Dykes RN (oncoming nurse) by Winston Ayala (offgoing nurse). Report included the following information SBAR, Kardex, Intake/Output, MAR, Accordion and Recent Results.

## 2019-10-16 NOTE — DISCHARGE SUMMARY
[address] [facility] [address] [place] [address]   Office [phone]  Fax [phone]       Discharge / Transfer / Off-Service Note     Name: Rafaela Nicole MRN: [id number]  Sex: Female   YOB: 1950  Age: 70 y.o. PCP: Benedicto Avitia MD     Date of admission: 10/15/2019  Date of discharge/transfer: 10/17/2019    Attending physician at admission: Dr. Eric Hardwick    Attending physician at discharge/transfer: Dr. Eric Hardwick  Resident physician at discharge/transfer: Aric Whiting DO     Consultants during hospitalization  Wally Cedeño NP (Neurology)     Admission diagnoses   Acute confusional state [F05]  Seizure [facility]) [R56.9]  TIA (transient ischemic attack) [G45.9]    Recommended follow-up after discharge  1. PCP Dr Keith Vidal  2. Neurology Carlos Tom NP    Things to follow up on with PCP:  TIA    History of Present Illness  Per admitting provider, \"The patient is a 60-year-old female with past medical history significant for TIA, CLL, stroke, and cancer, who presented to the emergency room with chief complaint of slurred speech, altered mental status, and right-sided numbness. Symptoms started one hour prior to arrival.  EMS reported the patient was confused and disoriented. The patient stated that she asked a neighbor to bring her a paper to see if she was able to write, but states that she was not able to write with the right hand on the piece of paper. En route, her blood glucose was 84, blood pressure 167/87, and she was getting her normal dose of Plavix. Symptoms resolved during my examination. The patient has a history of TIA in the past.     Hospitalist consulted to admit the patient for TIA workup. CT scan showed no intracranial enhancement. No occlusion or substantial stenosis of cervical or proximal intracranial arteries. We admitted the patient for further TIA workup. \"    HOSPITAL COURSE    TIA: Hx of TIA (9/8/15 & 9/12/18). CTH no acute findings.  CTA No evidence for acute large vessel arterial occlusion or significant stenosis. EKG Sinus rhythm w/ PVCs. TSH wnl. HgbA1c 5.8. TTE Left Ventricle: Normal cavity size, wall thickness and systolic function (ejection fraction normal), LVEF 56 - 60%, Mild (grade 1) left ventricular diastolic dysfunction. Carotid vascular imaging: No significant stenosis. MRI showed no acute abnormality.  - Discharged on ASA 81mg daily  - Continue home Plavix    Possible Seizures: Low suspicion for seizures. Trial of Keppra started by neurology during this admission, but discontinued prior to discharge.      HLD: Tg 116, HDL 51, LDL 50.8  - Continue home lipitor 40mg daily     Physical exam at discharge:    Vitals Reviewed. Visit Vitals  /75 (BP 1 Location: Right arm, BP Patient Position: At rest)   Pulse 78   Temp 97.9 °F (36.6 °C)   Resp 16   Ht 5' 5\" (1.651 m)   Wt 161 lb (73 kg)   SpO2 96%   BMI 26.79 kg/m²      General Oriented to person, place, and time and well-developed. Appears well-nourished, no distress. Not diaphoretic. Cardio Normal rate, regular rhythm. No murmur heard. No chest wall tenderness. Pulmonary Effort normal and breath sounds normal. No respiratory distress. No wheezes, no rales. Extremities No edema of lower extremities. No tenderness. Distal pulses intact. Neurological Alert and oriented to person, place, and time. Cranial nerves grossly in tact. Psychiatric Affect and judgment normal.        Condition at discharge: Stable.     Labs  Recent Labs     10/16/19  1017 10/16/19  0353 10/15/19  1855   WBC 10.7 10.8 12.1*   HGB 13.8 12.7 13.2   HCT 41.7 38.3 39.6    251 283     Recent Labs     10/16/19  1017 10/16/19  0353 10/15/19  1855    142 141   K 3.9 3.6 4.9   * 109* 112*   CO2 27 27 21   BUN 10 10 12   CREA 0.86 0.77 0.83   * 95 90   CA 8.8 8.6 8.7     Recent Labs     10/15/19  1855   SGOT 22   ALT 24   AP 83   TBILI 0.5   TP 7.0   ALB 4.0   GLOB 3.0     Recent Labs 10/16/19  1558 10/16/19  1017 10/16/19  0353 10/15/19  1855   TROIQ <0.05 <0.05 <0.05  --    INR  --   --   --  1.0   PTP  --   --   --  9.8   APTT  --   --   --  23.9       Procedures / Diagnostic Studies  · CT Head: No acute findings  · EKG:  Sinus rhythm with occasional premature ventricular complexes  · Echo: Left Ventricle: Normal cavity size, wall thickness and systolic function (ejection fraction normal), LVEF 56 - 60%, Mild (grade 1) left ventricular diastolic dysfunction. · CTA Head/neck: No evidence for acute large vessel arterial occlusion or significant stenosis  · MRI Brain WO Contrast: No acute intracranial abnormality. Imaging  Results from [place] encounter on 09/12/18   XR CHEST PA LAT    Narrative Indication:  recommended after portable CXR     Exam: PA and lateral views of the chest.    Direct comparison is made to prior CXR dated September 2018. Findings: Cardiomediastinal silhouette is within normal limits. Lungs are clear  bilaterally. Pleural spaces are normal. Osseous structures are intact. Impression IMPRESSION: No acute cardiopulmonary disease. No results found for this or any previous visit. No procedure found.       Chronic diagnoses   Problem List as of 10/16/2019 Date Reviewed: 8/26/2019          Codes Class Noted - Resolved    [facility] ([facility] ICD-10-CM: R56.9  ICD-9-CM: 780.39  10/15/2019 - Present        Acute confusional state ICD-10-CM: F05  ICD-9-CM: 293.0  10/15/2019 - Present        * (Principal) TIA (transient ischemic attack) ICD-10-CM: G45.9  ICD-9-CM: 435.9  9/12/2018 - Present        High cholesterol ICD-10-CM: E78.00  ICD-9-CM: 272.0  8/13/2018 - Present        Stroke syndrome ICD-10-CM: [id number]  ICD-9-CM: Ivins Cones  4/24/2018 - Present        Advance care planning ICD-10-CM: Z71.89  ICD-9-CM: V65.49  7/27/2017 - Present        Vertigo ICD-10-CM: R42  ICD-9-CM: 780.4  6/13/2017 - Present        SOB (shortness of breath) ICD-10-CM: R06.02  ICD-9-CM: 786.05  12/18/2015 - Present        Dizziness ICD-10-CM: R42  ICD-9-CM: 780.4  12/18/2015 - Present        RESOLVED: History of CVA (cerebrovascular accident) ICD-10-CM: Z86.73  ICD-9-CM: V12.54  6/13/2017 - 4/24/2018              Discharge/Transfer Medications  Discharge Medication List as of 10/16/2019  6:59 PM      START taking these medications    Details   aspirin 81 mg chewable tablet Take 1 Tab by mouth daily. , Print, Disp-30 Tab, R-0         CONTINUE these medications which have NOT CHANGED    Details   atorvastatin (LIPITOR) 40 mg tablet Take 40 mg by mouth nightly., Historical Med      clopidogrel (PLAVIX) 75 mg tab Take 75 mg by mouth nightly., Historical Med      gabapentin (NEURONTIN) 300 mg capsule TAKE 1 CAPSULE BY MOUTH THREE TIMES DAILY AS NEEDED FOR PAIN MAX  DAILY  AMOUNT  900  MG, Phone In, Disp-30 Cap, R-1              Diet:  Cardiac diet.     Activity:  As tolerated    Disposition: Home or Self Care    Discharge instructions to patient/family  Please seek medical attention for any new or worsening symptoms particularly fever, chest pain, shortness of breath, abdominal pain, nausea, vomiting    Follow up plans/appointments  Follow-up Information     Follow up With Specialties Details Why Contact Info    Sam Avitia NP Neurology In 2 weeks Hospital Follow up [address] [place] [phone]      [facility] [place], [facility] [address] on 10/18/2019 For post-hospital follow-up on Friday Oct 18th at 11:00AM [address] [facility] [address] [place] [address]             Sandy Estes DO  Family Medicine Resident       For Billing    Chief Complaint   Patient presents with    Neurologic Problem    Altered mental status       Hospital Problems  Date Reviewed: 8/26/2019          Codes Class Noted POA    Seizure ([facility] ICD-10-CM: R56.9  ICD-9-CM: 780.39  10/15/2019 Unknown        Acute confusional state ICD-10-CM: F05  ICD-9-CM: 293.0  10/15/2019 Unknown        * (Principal) TIA (transient ischemic attack) ICD-10-CM: G45.9  ICD-9-CM: 435.9  9/12/2018 Unknown        High cholesterol ICD-10-CM: E78.00  ICD-9-CM: 272.0  8/13/2018 Yes               [address] [facility] Medicine Residency Attending Addendum:  I saw and evaluated the patient on the day of the encounter with Dr. Josh Hoffman, performing the key elements of the service. I discussed the findings, assessment and plan with the resident and agree with the resident's findings and plan as documented in the resident's note.       Kentrell Engle MD, CAQSM, RMSK

## 2019-10-16 NOTE — PROGRESS NOTES
OCCUPATIONAL THERAPY EVALUATION/DISCHARGE  Patient: Arian Aguiar (11 y.o. female)  Date: 10/16/2019  Primary Diagnosis: Acute confusional state [F05]  Seizure ([address] [R56.9]  TIA (transient ischemic attack) [G45.9]       Precautions:        ASSESSMENT  Based on the objective data described below, the patient presents with baseline ability to perform ADLs tasks, and functional mobility following hospital admission for slurred speech, dizziness and inability to walk. Patient reports episode of mentioned symptoms, all now resolved. Patient requesting to complete ADL tasks this morning and performs without assistance. Patient currently without presentation of deficits and at baseline. No further OT needs at this time. Patient educated on BE FAST protocol and patient verbalized understanding. Functional Outcome Measure: The patient scored 66/66 on the [address] outcome measure. PLAN :    Recommendation for discharge: (in order for the patient to meet his/her long term goals)  No skilled occupational therapy/ follow up rehabilitation needs identified at this time. This discharge recommendation:  Has not yet been discussed the attending provider and/or case management    Equipment recommendations for successful discharge: none       SUBJECTIVE:   Patient stated I feel like myself again.     OBJECTIVE DATA SUMMARY:   HISTORY:   Past Medical History:   Diagnosis Date    [facility]     CLL (chronic lymphocytic leukemia) ([facility] [address]     Stroke ([facility] [address]     TIA (transient ischemic attack) 9/2015     Past Surgical History:   Procedure Laterality Date    BREAST SURGERY PROCEDURE UNLISTED      HX GYN  2004    [address]    HX OTHER SURGICAL  1998    ganglion cyst removed- wrist R    HX OTHER SURGICAL  1998    breast surgery    HX OTHER SURGICAL  1990    fibrodema (left breast)     HX OTHER SURGICAL  1999    ganglin cyst of the right hand        Prior Level of Function/Environment/Context: independent  Expanded or extensive additional review of patient history:   Home Situation  Home Environment: Private residence  One/Two Story Residence: One story  Living Alone: No  Support Systems: Family member(s)  Patient Expects to be Discharged to[de-identified] Private residence  Current DME Used/Available at Home: jennifer Hunter, Walker, rolling    Hand dominance: Right    EXAMINATION OF PERFORMANCE DEFICITS:  Cognitive/Behavioral Status:  Neurologic State: Alert  Orientation Level: Oriented X4  Cognition: Appropriate decision making; Follows commands  Perception: Appears intact  Perseveration: No perseveration noted  Safety/Judgement: Awareness of environment    Skin: intact as seen    Edema: none noted     Hearing: Auditory  Auditory Impairment: None    Vision/Perceptual:                                Corrective Lenses: Glasses    Range of Motion:  AROM: Within functional limits  PROM: Within functional limits                      Strength:  Strength: Within functional limits                Coordination:  Coordination: Within functional limits  Fine Motor Skills-Upper: Left Intact; Right Intact    Gross Motor Skills-Upper: Left Intact; Right Intact    Tone & Sensation:  Tone: Normal                         Balance:  Sitting: Intact  Standing: Intact    Functional Mobility and Transfers for ADLs:  Bed Mobility:  Rolling: Independent  Supine to Sit: Independent  Sit to Supine: Independent  Scooting: Independent    Transfers:  Sit to Stand: Independent  Stand to Sit: Independent  Bed to Chair: Independent  Bathroom Mobility: Independent  Toilet Transfer : Independent    ADL Assessment:  Feeding: Independent    Oral Facial Hygiene/Grooming: Independent    Bathing: Stand-by assistance    Upper Body Dressing: Independent    Lower Body Dressing: Stand-by assistance    Toileting: Stand by assistance                ADL Intervention and task modifications:                                     Cognitive Retraining  Safety/Judgement: Awareness of environment    Therapeutic Exercise:    Functional Measure:  Fugl-Worrell Assessment of Motor Recovery after Stroke:     Reflex Activity  Flexors/Biceps/Fingers: Can be elicited  Extensors/Triceps: Can be elicited  Reflex Subtotal: 4    Volitional Movement Within Synergies  Shoulder Retraction: Full  Shoulder Elevation: Full  Shoulder Abduction (90 degrees): Full  Shoulder External Rotation: Full  Elbow Flexion: Full  Forearm Supination: Full  Shoulder Adduction/Internal Rotation: Full  Elbow Extension: Full  Forearm Pronation: Full  Subtotal: 18    Volitional Movement Mixing Synergies  Hand to Lumbar Spine: Full  Shoulder Flexion (0-90 degrees): Full  Pronation-Supination: Full  Subtotal: 6    Volitional Movement With Little or No Synergy  Shoulder Abduction (0-90 degrees): Full  Shoulder Flexion ( degrees): Full  Pronation/Supination: Full  Subtotal : 6    Normal Reflex Activity  Biceps, Triceps, Finger Flexors: Full  Subtotal : 2    Upper Extremity Total   Upper Extremity Total: 36    Wrist  Stability at 15 Degree Dorsiflexion: Full  Repeated Dorsiflexion/ Volar Flexion: Full  Stability at 15 Degree Dorsiflexion: Full  Repeated Dorsiflexion/ Volar Flexion: Full  Circumduction: Full  Wrist Total: 10    Hand  Mass Flexion: Full  Mass Extension: Full  Grasp A: Full  Grasp B: Full  Grasp C: Full  Grasp D: Full  Grasp E: Full  Hand Total: 14    Coordination/Speed  Tremor: None  Dysmetria: None  Time: <1s  Coordination/Speed Total : 6    Total A-D  Total A-D (Motor Function): 66/66         This is a reliable/valid measure of arm function after a neurological event. It has established value to characterize functional status and for measuring spontaneous and therapy-induced recovery; tests proximal and distal motor functions. Fugl-Worrell Assessment - UE scores recorded between five and 30 days post neurologic event can be used to predict UE recovery at six months post neurologic event.   Severe = 0-21 points Moderately Severe = 22-33 points   Moderate = 34-47 points   Mild = 48-66 points  Corene Habermann, D., CLEM Tierney, & BOY Davis (1992). Measurement of motor recovery after stroke: Outcome assessment and sample size requirements. Stroke, 23, pp. 5439-2174.   --------------------------------------------------------------------------------------------------------------------------------------------------------------------  MCID:  Stroke:   Dario Underwood et al, 2001; n = 171; mean age 79 (6) years; assessed within 16 (12) days of stroke, Acute Stroke)  FMA Motor Scores from Admission to Discharge   10 point increase in FMA Upper Extremity = 1.5 change in discharge FIM   10 point increase in FMA Lower Extremity = 1.9 change in discharge FIM  MDC:   Stroke:   Edmar Church et al, 2008, n = 14, mean age = 59.9 (14.6) years, assessed on average 14 (6.5) months post stroke, Chronic Stroke)   FMA = 5.2 points for the Upper Extremity portion of the assessment         Occupational Therapy Evaluation Charge Determination   History Examination Decision-Making   LOW Complexity : Brief history review  LOW Complexity : 1-3 performance deficits relating to physical, cognitive , or psychosocial skils that result in activity limitations and / or participation restrictions  LOW Complexity : No comorbidities that affect functional and no verbal or physical assistance needed to complete eval tasks       Based on the above components, the patient evaluation is determined to be of the following complexity level: LOW   Pain Rating:      Activity Tolerance: WNL  Please refer to the flowsheet for vital signs taken during this treatment. After treatment patient left in no apparent distress:    Sitting in chair, Call bell within reach and Bed / chair alarm activated    COMMUNICATION/EDUCATION:   The patients plan of care was discussed with: Registered Nurse.     Thank you for this referral.  Juvenal Hidalgo OTR/EMIL  Time Calculation: 30 mins

## 2019-10-16 NOTE — ED NOTES
Pt Throughput: Charge Nurse on 5th floor  made aware of patient's bed assignment, room 524. Karuna Nickerson RN  Emergency Dept Charge RN.

## 2019-10-16 NOTE — PROGRESS NOTES
[facility]   Senior Resident Admission Note    CC: slurred speech, unable to walk or write, dizziness    HPI:  Lara Barth is a 70 y.o. female who presents to the ER complaining of slurred speech, unable to walk or write,dizziness. Chart reviewed. Patient seen, examined, and discussed with Dr. Mario Jackson (PGY-1). See H&P and progress note for more details. Patient was transferred from hospitalist service     TIA- with 2 previous TIA in past 3 years. CT head and prelim CTA nml  - F/u MRI and ECHO  - Lipid panel, A1c  - Trend troponin  - Continue plavix and lipitor  - Bedside swallow   - Neuro consult    ? Seziure- s/p Keppra 1000 mg in the ED  - Await neuro recs  - Continue Keppra 500 mg Q12h    I agree with remaining assessment and plan as documented in Dr. Mario Jackson note.       Pt discussed with Dr. Jack Lopez (on-call attending physician)    Hallie Paige MD  Family Medicine Resident

## 2019-10-16 NOTE — DISCHARGE INSTRUCTIONS
[address] [facility] / [id number] : 1950    Admission date: 10/15/2019 Discharge date: 10/16/2019     Please bring this form with you to show your care provider at your follow-up appointment. Primary care provider:  Chyna Aguilar MD    Discharging provider:  Richard Nguyen MD  - Family Medicine Resident  Shawn Acosta MD - Attending, Family Medicine     You have been admitted to the hospital with the following diagnoses:    ACUTE DIAGNOSES:  Acute confusional state [F05]  Seizure ([address] [R56.9]  TIA (transient ischemic attack) [G45.9]  . . . . . . . . . . . . . . . . . . . . . . . . . . . . . . . . . . . . . . . . . . . . . . . . . . . . . . . . . . . . . . . . . . . . . . . [facility] FOLLOW-UP CARE RECOMMENDATIONS:    Appointments  Follow-up Information     Follow up With Specialties Details Why Contact Info    Niyah Webber NP Neurology In 2 weeks Hospital Follow up [address] [place] [phone]      Kamilla Briceño DO [facility] Go on 10/18/2019 For post-hospital follow-up on Friday Oct 18th at 11:00AM [address]  [address] [facility]  [phone]             Please follow up with your PCP regarding:  - TIA    Follow-up tests needed: none    Pending test results: At the time of your discharge the following test results are still pending: none. Please make sure you review these results with your outpatient follow-up provider(s). Specific symptoms to watch for: chest pain, shortness of breath, fever, chills, nausea, vomiting, diarrhea, change in mentation, falling, weakness, bleeding. DIET/what to eat:  Cardiac Diet    ACTIVITY:  Activity as tolerated    Wound care: none    Equipment needed:  none    What to do if new or unexpected symptoms occur? If you experience any of the above symptoms (or should other concerns or questions arise after discharge) please call your primary care physician.  Return to the emergency room if you cannot get hold of your doctor. · It is very important that you keep your follow-up appointment(s). · Please bring discharge papers, medication list (and/or medication bottles) to your follow-up appointments for review by your outpatient provider(s). · Please check the list of medications and be sure it includes every medication (even non-prescription medications) that your provider wants you to take. · It is important that you take the medication exactly as they are prescribed. · Keep your medication in the bottles provided by the pharmacist and keep a list of the medication names, dosages, and times to be taken in your wallet. · Do not take other medications without consulting your doctor. · If you have any questions about your medications or other instructions, please talk to your nurse or care provider before you leave the hospital.     Information obtained by:     I understand that if any problems occur once I am at home I am to contact my physician. These instructions were explained to me and I had the opportunity to ask questions. I understand and acknowledge receipt of the instructions indicated above.                                                                                                                                                Physician's or R.N.'s Signature                                                                  Date/Time                                                                                                                                              Patient or Representative Signature                                                          Date/Time

## 2019-10-16 NOTE — PROGRESS NOTES
[facility]   Daily Progress Note    Date: 10/16/2019  PCP: Ernie Hi MD     [address] [facility] Medicine Residency Attending Addendum:  I saw and evaluated the patient on the day of the encounter with Dr. Sierra Machado, performing the key elements of the service. I discussed the findings, assessment and plan with the resident and agree with the resident's findings and plan as documented in the resident's note. Pending MRI, ECHO, and CTA. PT/OT to eval and appreciate Neurology recommendations. Hema Romero MD, Kamala Carpenter, DOROTHY      Assessment/Plan:     Andrew Chung is a 70 y.o. female with history of Hx of TIAs (2015, 2018) and HLD who has spent 1 night(s) in the hospital, who is admitted for TIA/stroke rule out vs. Complex seizure. 24 hour event: No acute events      CVA/TIA vs. Complex seizure: Seen by tele neuro in ED who expressed concern for seizure. CT head- no acute findings. TSH wnl.  - Admit to remote telemetry   - Vital signs per unit protocol  - Neurology consulted and appreciate the assistance and recommendations. - Keppra 500mg bid per tele neuro for possible seizure   - CTA head/neck- prelim reading shows no occlusion, follow up final read  - Echocardiogram  - MRI brain  - HgA1C, lipid panel  - Trops x 3   - Permissive HTN for the first 24-48 hours SBP<220 and DBP<110    - Continue home Plavix 75mg daily  - Consulted PT/OT   - Neuro check q4h   - CMP, CBC daily    HLD: 8/19/19 , HDL 54, LDL 53,   - Continue home atorvastatin 40mg daily  - Repeat lipid panel pending      FEN/GI: NPO until passes bedside swallow  DVT prophylaxis:SCDs  GI prophylaxis:not indicated at this time  Disposition: TBD, consult to PT/OT    CODE STATUS: FULL    Pt discussed with Dr. Jaycee Murray,    Family Medicine Resident        Subjective  Patient resting comfortably in bed upon entering room. Reports feeling much better than she did when she came in. Denies any current neurologic symptoms such as weakness, dizziness, numbness, slurred speech at this time. No other complaints at this time. Inpatient Medications  Current Facility-Administered Medications   Medication Dose Route Frequency    clopidogrel (PLAVIX) tablet 75 mg  75 mg Oral DAILY    atorvastatin (LIPITOR) tablet 40 mg  40 mg Oral DAILY    acetaminophen (TYLENOL) tablet 650 mg  650 mg Oral Q4H PRN    Or    acetaminophen (TYLENOL) solution 650 mg  650 mg Per NG tube Q4H PRN    Or    acetaminophen (TYLENOL) suppository 650 mg  650 mg Rectal Q4H PRN    levETIRAcetam (KEPPRA) 500 mg in saline (iso-osm) 100 mL IVPB (premix)  500 mg IntraVENous Q12H         Allergies  Allergies   Allergen Reactions    Ampicillin Hives    Bacitracin Other (comments)    Bactrim [Sulfamethoprim] Hives         Objective  Vitals:  Visit Vitals  /62 (BP 1 Location: Left arm, BP Patient Position: At rest)   Pulse 63   Temp 97.5 °F (36.4 °C)   Resp 17   Ht 5' 5\" (1.651 m)   Wt 161 lb 4 oz (73.1 kg)   SpO2 97%   BMI 26.83 kg/m²      Temp (24hrs), Av.7 °F (36.5 °C), Min:97.5 °F (36.4 °C), Max:97.9 °F (36.6 °C)         O2 Device: Room air    I/O:  No intake or output data in the 24 hours ending 10/16/19 0350  Last 3 shifts:    No intake/output data recorded. Physical Exam   Constitutional: She is oriented to person, place, and time and well-developed, well-nourished, and in no distress. HENT:   Head: Normocephalic and atraumatic. Eyes: Pupils are equal, round, and reactive to light. Conjunctivae and EOM are normal. No scleral icterus. Cardiovascular: Normal rate, regular rhythm, normal heart sounds and intact distal pulses. Exam reveals no gallop and no friction rub. No murmur heard. Pulmonary/Chest: Effort normal and breath sounds normal. No respiratory distress. She has no wheezes. She has no rales. Neurological: She is alert and oriented to person, place, and time. No cranial nerve deficit. Strength 5/5 UE and LE b/l  Sensation in tact    Skin: She is not diaphoretic.           Labs and Data:    Recent Labs     10/15/19  1855   WBC 12.1*   HGB 13.2   HCT 39.6        Recent Labs     10/15/19  1855      K 4.9   *   CO2 21   GLU 90   BUN 12   CREA 0.83   CA 8.7   ALB 4.0   SGOT 22   ALT 24   INR 1.0       Imaging/procedures: CT head- no acute findings, CTA head and neck- prelim with no blockage, MRI brain pending, echo pending      Signed by: Lenny Barreto DO  Resident, Family Medicine  10/16/19

## 2019-10-16 NOTE — ROUTINE PROCESS
I have reviewed discharge instructions with the patient. The patient verbalized understanding. Paper copy of AVS with 1 prescription given to patient. Patient signed RN copy of AVS in chart. PIV removed with tip intact. Patient did not require assistance getting dressed or gathering belongings. Waiting for patient ride at this time.

## 2019-10-16 NOTE — CONSULTS
[place] [facility]: [id number] [address] Neurology  [place] [address]  [phone]      Name:   Allison Dewitt record #: [id number]  Admission Date: 10/15/2019     Who Consulted: Dr. Jose F Patterson    Reason for Consult:  Right sided weakness and slurred speech    HISTORY OF PRESENT ILLNESS:     This is a 70 y.o. female who is admitted for slurred speech, altered mental status, right-sided numbness . The Neurology Service is asked to evaluate for stroke. Ms. Kenia Kirby presented to the ED yesterday with a one hour history of slurred speech, altered mental status, and right sided extremity numbness. Upon arrival by EMS he was foound to be confused and disoriented, unable to walk or write. Her admission Bp was 149/86, she was found the by the ED team to have tremor of the upper and lower extremity. Equal strength in upper and lower extremities. No pronator drift. Stuttering speech. Mildly slurred words. She was evaluated by teleneurology who thought her symptoms were more consistent with seizure or TIA. In discussion with Ms. Kenia Kirby, she says that she did not think she was ever confused but was having difficulty speaking and getting her right side to work. She denies biting her tongue or incontinence. She feels that she was able to speak and move her right side at baseline by 7:15pm.  She was last seen by our team in 2018 after a TIA in which she presented with sudden onset lightheadedness. Neuro-imaging:     CT Head: No acute findings    CTA Head and Neck: No evidence for acute large vessel arterial occlusion or significant stenosis       EKG:  Sinus rhythm with occasional premature ventricular complexes    Care Plan discussed with:  Patient x   Family    RN    Care Manager    Consultant/Specialist:         Thank you for allowing the Neurology Service the pleasure of participating in the care of your patient.   This patient was discussed with my collaborating care team physician,  Dr. Karlene Woodard. He is in agreement with the current plan of care and will see this patient in the morning. Tiffanie Azul, ACNP-BC  ====================  Assessment/Plan:     1. Transient Ischemic Attack, r/o Stroke:    · Continue home Plavix, add back in ASA 81 mg  · Will need ASA at discharge  · Neurochecks:  Every 4 hours  · Blood Sugar Goal:  140-180  · BP Goal: Less than 160  · Telemetry for at least 24 hours  · Low suspicion for seizure, Keppra stopped    Stroke work up  · A1C:  5.8  · LDL:  50.8, continue atorvastatin  · TTE:  Normal cavity size, wall thickness and systolic function (ejection fraction normal). Estimated left ventricular ejection fraction is 56 - 60%. · MRI:  · Carotid vascular imaging:  No significant stenosis    Risk factors for stroke include:  HTN, CAD, HLD, physical inactivity  · Discussed with patient    · Discussed signs/symptoms of stroke and when to call 911    3. Mobility:   · Has been OOB. · PT/OT to eval for rehab    4. Diet:    · Does not need SLP, passed STAND    5. VTE Prophylaxes:   · Per Primary team           Review of Systems: 10 point ROS was performed. Pertinent positives listed in HPI. Negative ROS is as follows. Pt denies: angina, palpitations, paresthesias, vision loss, slurred speech,  confusion, fever, chills, falls, headache, diplopia, back pain, neck pain, prior episodes of vertigo, hallucinations, new medications or dosage changes. Physical Exam    General:   Alert, cooperative, no acute distress. Lungs:   Clear to auscultation bilaterally. No crackles/wheezes. Heart:  Abdominal:  Normal rhythm, no carotid bruits, no peripheral edema  Soft and nondistended   NEUROLOGICAL EXAM:     Mental Status: Oriented to time, place and person. Fully attentive. No aphasia. Full fund of knowledge. Normal recent and remote memory. Cranial Nerves:   Visual Fields:  normal in all quadrants in both eyes. EOM: no nystagmus.  Facial movements: symmetric, no ptosis Facial sensation:  intact to LT on both sides. Hearing:  normal.       Language:  no dysarthria, but very focused and slow with diction, no aphasia, normal repetition. Tongue: midline. Soft palate: not examined  SCMs: normal, symmetric. Reflexes:   LUExt: 2+/ 4                 RUExt: 2+/ 4  LLExt: 2+/4                  RLExt: 2+/ 4          Sensory:   LT and Temp intact in all extremities           Cerebellar:  No resting, no postural tremors, normal finger nose finger. No pronator drift                            Motor:           LUExt: 5/ 5               RUExt: 5/ 5                                              LLExt: 5/ 5                RLExt: 5/ 5        Gait:   Not tested              Allergies: Allergies   Allergen Reactions    Ampicillin Hives    Bacitracin Other (comments)    Bactrim [Sulfamethoprim] Hives       Outpatient Meds  No current facility-administered medications on file prior to encounter. Current Outpatient Medications on File Prior to Encounter   Medication Sig Dispense Refill    atorvastatin (LIPITOR) 40 mg tablet Take 40 mg by mouth nightly.  clopidogrel (PLAVIX) 75 mg tab Take 75 mg by mouth nightly.       gabapentin (NEURONTIN) 300 mg capsule TAKE 1 CAPSULE BY MOUTH THREE TIMES DAILY AS NEEDED FOR PAIN MAX  DAILY  AMOUNT  900  MG 30 Cap 1       Inpatient Meds    Current Facility-Administered Medications   Medication Dose Route Frequency Provider Last Rate Last Dose    influenza vaccine 2019-20 (6 mos+)(PF) (FLUARIX/FLULAVAL/FLUZONE QUAD) injection 0.5 mL  0.5 mL IntraMUSCular PRIOR TO DISCHARGE Vitor Bazan MD        clopidogrel (PLAVIX) tablet 75 mg  75 mg Oral DAILY Dheeraj Snell MD   75 mg at 10/16/19 0857    atorvastatin (LIPITOR) tablet 40 mg  40 mg Oral DAILY Dheeraj Snell MD   40 mg at 10/15/19 2224    acetaminophen (TYLENOL) tablet 650 mg  650 mg Oral Q4H PRN Dheeraj Snell MD        Or    acetaminophen (TYLENOL) solution 650 mg 650 mg Per NG tube Q4H PRN Berta Zapata MD        Or   Shasha.Hitch acetaminophen (TYLENOL) suppository 650 mg  650 mg Rectal Q4H PRN Berta Zapata MD        levETIRAcetam (KEPPRA) 500 mg in saline (iso-osm) 100 mL IVPB (premix)  500 mg IntraVENous Q12H Berta Zapata  mL/hr at 10/16/19 0036 500 mg at 10/16/19 0036           Past Medical History:   Diagnosis Date    Cancer [facility])     [facility]     Stroke [facility]     TIA (transient ischemic attack) 9/2015       Past Surgical History:   Procedure Laterality Date    BREAST SURGERY PROCEDURE UNLISTED      HX GYN  2004    [address]    HX OTHER SURGICAL  1998    ganglion cyst removed- wrist R    HX OTHER SURGICAL  1998    breast surgery    HX OTHER SURGICAL  1990    fibrodema (left breast)     HX OTHER SURGICAL  1999    ganglin cyst of the right hand        family history includes Cancer in her mother. reports that she has never smoked. She has never used smokeless tobacco. She reports that she does not drink alcohol or use drugs.            Lab Results (last 24 hrs)  Recent Results (from the past 24 hour(s))   EKG, 12 LEAD, INITIAL    Collection Time: 10/15/19  6:54 PM   Result Value Ref Range    Ventricular Rate 87 BPM    Atrial Rate 87 BPM    P-R Interval 144 ms    QRS Duration 78 ms    Q-T Interval 388 ms    QTC Calculation (Bezet) 466 ms    Calculated P Axis 45 degrees    Calculated R Axis -4 degrees    Calculated T Axis 4 degrees    Diagnosis       Sinus rhythm with occasional premature ventricular complexes  Otherwise normal ECG  When compared with ECG of 12-SEP-2018 12:45,  premature ventricular complexes are now present     CBC WITH AUTOMATED DIFF    Collection Time: 10/15/19  6:55 PM   Result Value Ref Range    WBC 12.1 (H) 3.6 - 11.0 K/uL    RBC 4.53 3.80 - 5.20 M/uL    HGB 13.2 11.5 - 16.0 g/dL    HCT 39.6 35.0 - 47.0 %    MCV 87.4 80.0 - 99.0 FL    MCH 29.1 26.0 - 34.0 PG    MCHC 33.3 30.0 - 36.5 g/dL    RDW 12.8 11.5 - 14.5 %    PLATELET 564 558 - 330 K/uL    MPV 9.4 8.9 - 12.9 FL    NRBC 0.0 0  WBC    ABSOLUTE NRBC 0.00 0.00 - 0.01 K/uL    NEUTROPHILS 41 32 - 75 %    LYMPHOCYTES 52 (H) 12 - 49 %    MONOCYTES 5 5 - 13 %    EOSINOPHILS 1 0 - 7 %    BASOPHILS 1 0 - 1 %    IMMATURE GRANULOCYTES 0 0.0 - 0.5 %    ABS. NEUTROPHILS 5.0 1.8 - 8.0 K/UL    ABS. LYMPHOCYTES 6.3 (H) 0.8 - 3.5 K/UL    ABS. MONOCYTES 0.6 0.0 - 1.0 K/UL    ABS. EOSINOPHILS 0.1 0.0 - 0.4 K/UL    ABS. BASOPHILS 0.1 0.0 - 0.1 K/UL    ABS. IMM. GRANS. 0.0 0.00 - 0.04 K/UL    DF SMEAR SCANNED      RBC COMMENTS NORMOCYTIC, NORMOCHROMIC     METABOLIC PANEL, COMPREHENSIVE    Collection Time: 10/15/19  6:55 PM   Result Value Ref Range    Sodium 141 136 - 145 mmol/L    Potassium 4.9 3.5 - 5.1 mmol/L    Chloride 112 (H) 97 - 108 mmol/L    CO2 21 21 - 32 mmol/L    Anion gap 8 5 - 15 mmol/L    Glucose 90 65 - 100 mg/dL    BUN 12 6 - 20 MG/DL    Creatinine 0.83 0.55 - 1.02 MG/DL    BUN/Creatinine ratio 14 12 - 20      GFR est AA >60 >60 ml/min/1.73m2    GFR est non-AA >60 >60 ml/min/1.73m2    Calcium 8.7 8.5 - 10.1 MG/DL    Bilirubin, total 0.5 0.2 - 1.0 MG/DL    ALT (SGPT) 24 12 - 78 U/L    AST (SGOT) 22 15 - 37 U/L    Alk. phosphatase 83 45 - 117 U/L    Protein, total 7.0 6.4 - 8.2 g/dL    Albumin 4.0 3.5 - 5.0 g/dL    Globulin 3.0 2.0 - 4.0 g/dL    A-G Ratio 1.3 1.1 - 2.2     PROTHROMBIN TIME + INR    Collection Time: 10/15/19  6:55 PM   Result Value Ref Range    INR 1.0 0.9 - 1.1      Prothrombin time 9.8 9.0 - 11.1 sec   PTT    Collection Time: 10/15/19  6:55 PM   Result Value Ref Range    aPTT 23.9 22.1 - 32.0 sec    aPTT, therapeutic range     58.0 - 77.0 SECS   SAMPLES BEING HELD    Collection Time: 10/15/19  6:55 PM   Result Value Ref Range    SAMPLES BEING HELD 1SST,1 RED     COMMENT        Add-on orders for these samples will be processed based on acceptable specimen integrity and analyte stability, which may vary by analyte.    LIPID PANEL    Collection Time: 10/16/19  3:22 AM   Result Value Ref Range    LIPID PROFILE          Cholesterol, total 125 <200 MG/DL    Triglyceride 116 <150 MG/DL    HDL Cholesterol 51 MG/DL    LDL, calculated 50.8 0 - 100 MG/DL    VLDL, calculated 23.2 MG/DL    CHOL/HDL Ratio 2.5 0.0 - 5.0     HEMOGLOBIN A1C WITH EAG    Collection Time: 10/16/19  3:22 AM   Result Value Ref Range    Hemoglobin A1c 5.8 4.2 - 6.3 %    Est. average glucose 120 mg/dL   CBC W/O DIFF    Collection Time: 10/16/19  3:53 AM   Result Value Ref Range    WBC 10.8 3.6 - 11.0 K/uL    RBC 4.34 3.80 - 5.20 M/uL    HGB 12.7 11.5 - 16.0 g/dL    HCT 38.3 35.0 - 47.0 %    MCV 88.2 80.0 - 99.0 FL    MCH 29.3 26.0 - 34.0 PG    MCHC 33.2 30.0 - 36.5 g/dL    RDW 12.9 11.5 - 14.5 %    PLATELET 431 553 - 812 K/uL    MPV 9.3 8.9 - 12.9 FL    NRBC 0.0 0  WBC    ABSOLUTE NRBC 0.00 0.00 - 1.85 K/uL   METABOLIC PANEL, BASIC    Collection Time: 10/16/19  3:53 AM   Result Value Ref Range    Sodium 142 136 - 145 mmol/L    Potassium 3.6 3.5 - 5.1 mmol/L    Chloride 109 (H) 97 - 108 mmol/L    CO2 27 21 - 32 mmol/L    Anion gap 6 5 - 15 mmol/L    Glucose 95 65 - 100 mg/dL    BUN 10 6 - 20 MG/DL    Creatinine 0.77 0.55 - 1.02 MG/DL    BUN/Creatinine ratio 13 12 - 20      GFR est AA >60 >60 ml/min/1.73m2    GFR est non-AA >60 >60 ml/min/1.73m2    Calcium 8.6 8.5 - 10.1 MG/DL   TROPONIN I    Collection Time: 10/16/19  3:53 AM   Result Value Ref Range    Troponin-I, Qt. <0.05 <0.05 ng/mL   ECHO ADULT COMPLETE    Collection Time: 10/16/19  9:14 AM   Result Value Ref Range    LA Volume 52.72 22 - 52 mL    Ao Root D 3.43 cm   CBC W/O DIFF    Collection Time: 10/16/19 10:17 AM   Result Value Ref Range    WBC 10.7 3.6 - 11.0 K/uL    RBC 4.74 3.80 - 5.20 M/uL    HGB 13.8 11.5 - 16.0 g/dL    HCT 41.7 35.0 - 47.0 %    MCV 88.0 80.0 - 99.0 FL    MCH 29.1 26.0 - 34.0 PG    MCHC 33.1 30.0 - 36.5 g/dL    RDW 12.9 11.5 - 14.5 %    PLATELET 849 827 - 494 K/uL    MPV 9.1 8.9 - 12.9 FL    NRBC 0.0 0  WBC    ABSOLUTE NRBC 0.00 0.00 - 0.01 K/uL

## 2019-10-16 NOTE — PROGRESS NOTES
[facility]: MED RECONCILIATION    Comments/Recommendations:   Med rec performed via interview with patient who was a good historian. Patient states she recently finished a 7 day course of valtrex for shingles. Patient reports taking her gabapentin only as needed for pain. Allergies: Ampicillin; Bacitracin; and Bactrim [sulfamethoprim]    Prior to Admission Medications:     Medication Documentation Review Audit       Reviewed by ANDRÉS GudinoD (Pharmacist) on 10/15/19 at 2143      Medication Sig Documenting Provider Last Dose Status Taking?   atorvastatin (LIPITOR) 40 mg tablet Take 40 mg by mouth nightly. Provider, Historical 10/14/2019 Unknown time Active Yes   clopidogrel (PLAVIX) 75 mg tab Take 75 mg by mouth nightly.  Provider, Historical 10/15/2019 PM Active Yes   gabapentin (NEURONTIN) 300 mg capsule TAKE 1 CAPSULE BY MOUTH THREE TIMES DAILY AS NEEDED FOR PAIN MAX  DAILY  AMOUNT  900  MG Spring Notice, NP  [address] [facility], PHARMD   Contact: [phone]

## 2019-10-16 NOTE — ED NOTES
TRANSFER - OUT REPORT:    Verbal report given to Sascha Bishop RN(name) on Dinesh  being transferred to [facility](unit) for routine progression of care       Report consisted of patients Situation, Background, Assessment and   Recommendations(SBAR). Information from the following report(s) SBAR, ED Summary, Procedure Summary, MAR, Recent Results and Cardiac Rhythm NSR was reviewed with the receiving nurse. Lines:   Peripheral IV 10/15/19 Left Antecubital (Active)   Site Assessment Clean, dry, & intact 10/15/2019  6:52 PM   Phlebitis Assessment 0 10/15/2019  6:52 PM   Infiltration Assessment 0 10/15/2019  6:52 PM   Dressing Status Clean, dry, & intact 10/15/2019  6:52 PM       Peripheral IV 10/15/19 Right Antecubital (Active)   Site Assessment Clean, dry, & intact 10/15/2019  6:53 PM   Phlebitis Assessment 0 10/15/2019  6:53 PM   Infiltration Assessment 0 10/15/2019  6:53 PM   Dressing Status Clean, dry, & intact 10/15/2019  6:53 PM        Opportunity for questions and clarification was provided.       Patient transported with:   Monitor  Registered Nurse

## 2019-10-16 NOTE — ROUTINE PROCESS
Bedside shift change report given to Leonel Perez RN (oncoming nurse) by Misael Vogel RN (offgoing nurse). Report included the following information SBAR, Kardex, Intake/Output, Recent Results and Quality Measures.

## 2019-10-16 NOTE — PROGRESS NOTES
10/16/2019  Reason for Admission:   TIA/CVA vs sz                  RRAT Score:   11; Low/green              Do you (patient/family) have any concerns for transition/discharge? No              Plan for utilizing home health:   No    Current Advanced Directive/Advance Care Plan:  Full Code; Adv. Care Plan not on file    10/16/2019   CARE MANAGEMENT NOTE:  CM is following pt for initial discharge planning. EMR reviewed. CM met with pt who was her own historian for this needs assessment. Reportedly, pt resides with her boyfriend in a two story home with bedroom on the second floor. There is a chairlift and a bedroom on the ground floor as needed. Pt has a son and dtr who reside locally. PTA, pt was ambulatory without any assistive device, and she is indepn with ADLs. Pt drives. She is partially retired but she assists her  with his job. Pt obtains medications from [address] [place] [address] on [facility]. She does not have home healthcare currently. DME in the home includes a cane, rolling walker, BSC, and shower chair. PCP is Dr. Carolynn Woods. Pt was admitted under OBS status. CM provided written and oral explanation of Medicare Outpt and State OBS letters. Signed copies were placed onto pt's chart. TransPition of Care Plan:  1. Plan is to return home without any anticipated post discharge needs at this writing. 2.  Outpt f/u  3. Pt will arrange her own transportation home. CM will continue to follow pt until discharge.   Yuridia

## 2019-10-16 NOTE — PROGRESS NOTES
Speech LAnguage Pathology evaluation/discharge  Patient: Arian Aguiar (28 y.o. female)  Date: 10/16/2019  Primary Diagnosis: Acute confusional state [F05]  Seizure ([address] [R56.9]  TIA (transient ischemic attack) [G45.9]       Precautions:        ASSESSMENT :  All four domains of language were assessed at bedside. Patient's symptoms from yesterday are resolving. Based on the objective data described below, the patient presents with intact speech and language skills. No dysarthria noted. No aphasia noted. Mild wording finding difficulties in conversation. No voice concerns. Patient also presents with a functional swallow. No s/s of aspiration noted during eval.     Patient was admitted 10-15-19 with possible stroke. Patient presented with slurred speech, AMS, R side numbness, and inability to read or write. CTA head: neg. CT head: neg. PMH: TIA (2015), CLL, stroke, cancer     Skilled acute therapy provided by a speech-language pathologist is not indicated at this time. PLAN :  Recommendations:  No needs at this time. Discharge Recommendations: None     SUBJECTIVE:   Patient that symptoms started yesterday and lasted for about three hours. At first she was able to verbalize, but about 10 minutes later lost the ability to verbalize and write. Went to the ER shortly after.      OBJECTIVE:     Past Medical History:   Diagnosis Date    [facility]     CLL (chronic [facility]ocytic leukemia) ([facility] [address] [facility]     Stroke ([facility] [facility]     TIA (transient ischemic attack) 9/2015     Past Surgical History:   Procedure Laterality Date    BREAST SURGERY PROCEDURE UNLISTED      HX GYN  2004    [address]    HX OTHER SURGICAL  1998    ganglion cyst removed- wrist R    HX OTHER SURGICAL  1998    breast surgery    HX OTHER SURGICAL  1990    fibrodema (left breast)     HX OTHER SURGICAL  1999    ganglin cyst of the right hand      Prior Level of Function/Home Situation: lives independently with boyfriend  Home Situation  Home Environment: Private residence  One/Two Story Residence: Two story, live on 1st floor  Lift Chair Available: Yes  Living Alone: No  Support Systems: Family member(s)  Patient Expects to be Discharged to[de-identified] Private residence  Current DME Used/Available at Home: [employer], straight, Walker, rolling  Mental Status:  Neurologic State: Alert  Orientation Level: Oriented X4  Cognition: Appropriate decision making, Follows commands, Appropriate for age attention/concentration, Appropriate safety awareness  Perception: Appears intact  Perseveration: No perseveration noted  Safety/Judgement: Awareness of environment, Insight into deficits, Good awareness of safety precautions     Language Comprehension and Expression:  Auditory Comprehension  Auditory Impairment: Yes  Response to Complex Yes/No Questions (%) : 100 %  Three-Step Basic Commands (%): 100 %     Verbal Expression  Primary Mode of Expression: Verbal   Patient completed confrontation naming tasks with 100% accuracy across 12 trials. Patient's ability to perform divergent naming tasks were WNL. Reading:  Patient read 5 sentences and 5 headlines with 100% accuracy. Writing:  Patient successfully wrote her name in cursive and printed her address. Patient also successfully wrote two sentences. Pragmatics: WNL     Voice:   WNL      Swallowing:  Patient took trials of water and emiliano cracker. No s/s of aspiration noted during trials. Swallow strength is strong. NOMS: The NOMS functional outcome measure was used to quantify this patient's level of expressive language impairment. Based on the NOMS, the patient was determined to be at level 7 for spoken language expression. NOMS Spoken Language Expression:  Level 1 (CN): Verbalizations not meaningful to anyone. Level 2 (CM): Few attempts accurate/appropriate. Max cues to elicit automatic/imitative words/phrases.   Level 3 (CL): Communication partner responsible for communication; Mod cues for words/phrases meaningful in context  Level 4 (CK): Initiate during simple, routine activities w/familiar partner. Mod cues to produce simple sentences  Level 5 (Jami Challis): Initiates communication with familiar and unfamiliar partners. Min cues for complex sentences. Level 6 (CI): Communicates for most activities. Some limitations still present for vocational/social activities. Rarely cued for complex info  Level 7 [facility]): Independent communication. FABIÁN. (2003). National Outcomes Measurement System (NOMS): Adult Speech-Language Pathology User's Guide. After treatment:   []   Patient left in no apparent distress sitting up in chair  [x]   Patient left in no apparent distress in bed  [x]   Call bell left within reach  [x]   Nursing notified  []   Caregiver present  []   Bed alarm activated    COMMUNICATION/EDUCATION:   The patients plan of care including findings and recommendations was discussed with: Registered Nurse. [x]   Patient/family have participated as able and agree with findings and recommendations. []   Patient is unable to participate in plan of care at this time. Thank you for this referral.  Eugene Shi               Regarding student involvement in patient care:  A student participated in this treatment session. Per CMS Medicare statements and FABIÁN guidelines I certify that the following was true:  1. I was present and directly observed the entire session. 2. I made all skilled judgments and clinical decisions regarding care. 3. I am the practitioner responsible for assessment, treatment, and documentation.

## 2019-10-16 NOTE — H&P
Richie Coleman [facility] 78  HISTORY AND PHYSICAL    Name:  Oksana Carbone  MR#:  [id number]  :  1950  ACCOUNT #:  [de-identified]  ADMIT DATE:  10/15/2019      CHIEF COMPLAINT:  Slurred speech, altered mental status, right-sided numbness. HISTORY OF PRESENT ILLNESS:  The patient is a 69-year-old female with past medical history significant for TIA, CLL, stroke, and cancer, who presented to the emergency room with chief complaint of slurred speech, altered mental status, and right-sided numbness. Symptoms started one hour prior to arrival.  EMS reported the patient was confused and disoriented. The patient stated that she asked a neighbor to bring her a paper to see if she was able to write, but states that she was not able to write with the right hand on the piece of paper. En route, her blood glucose was 84, blood pressure 167/87, and she was getting her normal dose of Plavix. Symptoms resolved during my examination. The patient has a history of TIA in the past.    Hospitalist consulted to admit the patient for TIA workup. CT scan showed no intracranial enhancement. No occlusion or substantial stenosis of cervical or proximal intracranial arteries. We admitted the patient for further TIA workup. PAST MEDICAL HISTORY:  1. Cancer. 2.  CLL. 3.  Stroke. 4.  TIA. PAST SURGICAL HISTORY:  Breast surgery, D and C, ganglion cyst removal of right wrist.    ALLERGIES:  AMPICILLIN, BACTRIM. SOCIAL HISTORY:  Nonsmoker, no EtOH. FAMILY HISTORY:  Renal and bladder cancer. REVIEW OF SYSTEMS:  Review of Systems   Constitutional: Negative for chills, fever and weight loss. Respiratory: Negative for cough and shortness of breath. Cardiovascular: Negative for chest pain and palpitations. Musculoskeletal: Negative for back pain, falls, joint pain, myalgias and neck pain. Skin: Negative for rash.    Neurological: Positive for dizziness, tingling, sensory change, speech change, focal weakness and weakness. Negative for tremors and headaches. Psychiatric/Behavioral: Negative for depression, hallucinations, substance abuse and suicidal ideas. The patient is not nervous/anxious and does not have insomnia. PHYSICAL EXAMINATION:   Visit Vitals  /60   Pulse 72   Temp 97.7 °F (36.5 °C)   Resp 17   Ht 5' 5\" (1.651 m)   Wt 73.1 kg (161 lb 4 oz)   SpO2 92%   BMI 26.83 kg/m²   Physical Exam   Constitutional: She is oriented to person, place, and time. No distress. HENT:   Head: Normocephalic and atraumatic. Eyes: Right eye exhibits no discharge. Left eye exhibits no discharge. No scleral icterus. Neck: No tracheal deviation present. No thyromegaly present. Cardiovascular: Normal rate, regular rhythm, normal heart sounds and intact distal pulses. Exam reveals no gallop. No murmur heard. Pulmonary/Chest: No respiratory distress. She has no wheezes. She has no rales. She exhibits no tenderness. Abdominal: She exhibits no distension and no mass. There is no tenderness. There is no rebound and no guarding. Musculoskeletal: She exhibits no edema or tenderness. Lymphadenopathy:     She has no cervical adenopathy. Neurological: She is alert and oriented to person, place, and time. She displays normal reflexes. No cranial nerve deficit. She exhibits normal muscle tone. Coordination normal. GCS score is 15. Skin: No rash noted. She is not diaphoretic. No erythema. No pallor. Psychiatric: Affect and judgment normal.       IMAGING STUDIES:  CT scan with contrast of the head and neck shows no intracranial enhancement abnormalities demonstrated. No occlusion or substantial stenosis of cervical or proximal intracranial arteries.   Recent Results (from the past 24 hour(s))   EKG, 12 LEAD, INITIAL    Collection Time: 10/15/19  6:54 PM   Result Value Ref Range    Ventricular Rate 87 BPM    Atrial Rate 87 BPM    P-R Interval 144 ms    QRS Duration 78 ms    Q-T Interval 388 ms    QTC Calculation (Bezet) 466 ms    Calculated P Axis 45 degrees    Calculated R Axis -4 degrees    Calculated T Axis 4 degrees    Diagnosis       Sinus rhythm with occasional premature ventricular complexes  Otherwise normal ECG  When compared with ECG of 12-SEP-2018 12:45,  premature ventricular complexes are now present     CBC WITH AUTOMATED DIFF    Collection Time: 10/15/19  6:55 PM   Result Value Ref Range    WBC 12.1 (H) 3.6 - 11.0 K/uL    RBC 4.53 3.80 - 5.20 M/uL    HGB 13.2 11.5 - 16.0 g/dL    HCT 39.6 35.0 - 47.0 %    MCV 87.4 80.0 - 99.0 FL    MCH 29.1 26.0 - 34.0 PG    MCHC 33.3 30.0 - 36.5 g/dL    RDW 12.8 11.5 - 14.5 %    PLATELET 069 628 - 449 K/uL    MPV 9.4 8.9 - 12.9 FL    NRBC 0.0 0  WBC    ABSOLUTE NRBC 0.00 0.00 - 0.01 K/uL    NEUTROPHILS 41 32 - 75 %    LYMPHOCYTES 52 (H) 12 - 49 %    MONOCYTES 5 5 - 13 %    EOSINOPHILS 1 0 - 7 %    BASOPHILS 1 0 - 1 %    IMMATURE GRANULOCYTES 0 0.0 - 0.5 %    ABS. NEUTROPHILS 5.0 1.8 - 8.0 K/UL    ABS. LYMPHOCYTES 6.3 (H) 0.8 - 3.5 K/UL    ABS. MONOCYTES 0.6 0.0 - 1.0 K/UL    ABS. EOSINOPHILS 0.1 0.0 - 0.4 K/UL    ABS. BASOPHILS 0.1 0.0 - 0.1 K/UL    ABS. IMM. GRANS. 0.0 0.00 - 0.04 K/UL    DF SMEAR SCANNED      RBC COMMENTS NORMOCYTIC, NORMOCHROMIC     METABOLIC PANEL, COMPREHENSIVE    Collection Time: 10/15/19  6:55 PM   Result Value Ref Range    Sodium 141 136 - 145 mmol/L    Potassium 4.9 3.5 - 5.1 mmol/L    Chloride 112 (H) 97 - 108 mmol/L    CO2 21 21 - 32 mmol/L    Anion gap 8 5 - 15 mmol/L    Glucose 90 65 - 100 mg/dL    BUN 12 6 - 20 MG/DL    Creatinine 0.83 0.55 - 1.02 MG/DL    BUN/Creatinine ratio 14 12 - 20      GFR est AA >60 >60 ml/min/1.73m2    GFR est non-AA >60 >60 ml/min/1.73m2    Calcium 8.7 8.5 - 10.1 MG/DL    Bilirubin, total 0.5 0.2 - 1.0 MG/DL    ALT (SGPT) 24 12 - 78 U/L    AST (SGOT) 22 15 - 37 U/L    Alk.  phosphatase 83 45 - 117 U/L    Protein, total 7.0 6.4 - 8.2 g/dL    Albumin 4.0 3.5 - 5.0 g/dL    Globulin 3.0 2.0 - 4.0 g/dL    A-G Ratio 1.3 1.1 - 2.2     PROTHROMBIN TIME + INR    Collection Time: 10/15/19  6:55 PM   Result Value Ref Range    INR 1.0 0.9 - 1.1      Prothrombin time 9.8 9.0 - 11.1 sec   PTT    Collection Time: 10/15/19  6:55 PM   Result Value Ref Range    aPTT 23.9 22.1 - 32.0 sec    aPTT, therapeutic range     58.0 - 77.0 SECS   SAMPLES BEING HELD    Collection Time: 10/15/19  6:55 PM   Result Value Ref Range    SAMPLES BEING HELD 1SST,1 RED     COMMENT        Add-on orders for these samples will be processed based on acceptable specimen integrity and analyte stability, which may vary by analyte. CBC W/O DIFF    Collection Time: 10/16/19  3:53 AM   Result Value Ref Range    WBC 10.8 3.6 - 11.0 K/uL    RBC 4.34 3.80 - 5.20 M/uL    HGB 12.7 11.5 - 16.0 g/dL    HCT 38.3 35.0 - 47.0 %    MCV 88.2 80.0 - 99.0 FL    MCH 29.3 26.0 - 34.0 PG    MCHC 33.2 30.0 - 36.5 g/dL    RDW 12.9 11.5 - 14.5 %    PLATELET 150 207 - 262 K/uL    MPV 9.3 8.9 - 12.9 FL    NRBC 0.0 0  WBC    ABSOLUTE NRBC 0.00 0.00 - 5.67 K/uL   METABOLIC PANEL, BASIC    Collection Time: 10/16/19  3:53 AM   Result Value Ref Range    Sodium 142 136 - 145 mmol/L    Potassium 3.6 3.5 - 5.1 mmol/L    Chloride 109 (H) 97 - 108 mmol/L    CO2 27 21 - 32 mmol/L    Anion gap 6 5 - 15 mmol/L    Glucose 95 65 - 100 mg/dL    BUN 10 6 - 20 MG/DL    Creatinine 0.77 0.55 - 1.02 MG/DL    BUN/Creatinine ratio 13 12 - 20      GFR est AA >60 >60 ml/min/1.73m2    GFR est non-AA >60 >60 ml/min/1.73m2    Calcium 8.6 8.5 - 10.1 MG/DL   TROPONIN I    Collection Time: 10/16/19  3:53 AM   Result Value Ref Range    Troponin-I, Qt. <0.05 <0.05 ng/mL     ASSESSMENT AND PLAN:  The patient is a 65-year-old female admitted for transient ischemic attack workup. Admit the patient to telemetry unit. CT scan, no occlusion of major vessels, no intracranial bleeding. We admitted the patient for MRI, MRA, echo, Neurology consult as well as seizure workup.   1.  History of transient ischemic attack, continue Plavix. 2.  Hyperlipidemia, continue Lipitor. 3.  Neuropathy, continue gabapentin. 4.  Code status:  Full code. 5.  Deep venous thrombosis prophylaxis, heparin. 6.  Consultation:  Neurology. 7.  Disposition:  Home when medically stable.         Vivian Munoz MD      [id number]  D:  10/15/2019 23:47  T:  10/16/2019 4:04  JOB #:  [id number]

## 2019-10-16 NOTE — ED NOTES
Pt is resting comfortably with family at bedside. NAD noted. Will continue to monitor.  Awaiting orders from hospitalist.

## 2019-10-17 ENCOUNTER — PATIENT OUTREACH (OUTPATIENT)
Dept: CARDIOLOGY CLINIC | Age: 69
End: 2019-10-17

## 2019-10-17 NOTE — PROGRESS NOTES
As per patient, ride will be here to pick her at 2030. Pt has all paper for discharge instruction and summary. Will wait for family for ride.

## 2019-10-17 NOTE — PROGRESS NOTES
Hospital Discharge Follow-Up      Date/Time:  10/17/2019 11:55 AM    Patient was admitted to [facility] on 10/15/19 and discharged on 10/17/19 for TIA r/o CVA. The physician discharge summary was available at the time of outreach. Patient was contacted within one business days of discharge. Top Challenges reviewed with the provider   PCP- Dr. Mandy Sibley seeing for DAMIAN MANE- let him know about stress and other RF reductions. Relayed that she had previously stopping ASA per MD recommendations. Relayed I was going to mail out resources for above and about completing AMD.    Advance Care Planning:   Does patient have an Advance Directive:  not on file        Method of communication with provider :staff message    Inpatient RRAT score: 6  Was this a readmission? no     Care Transition Nurse (CTN) contacted the patient by telephone to perform post hospital discharge assessment. Verified name and  with patient as identifiers. Provided introduction to self, and explanation of the CTN role. Patient received hospital discharge instructions. CTN reviewed discharge instructions and red flags with patient who verbalized understanding. Patient given an opportunity to ask questions and does not have any further questions or concerns at this time. The patient agrees to contact the PCP office for questions related to their healthcare. CTN provided contact information for future reference. Disease Specific:   N/A    Patients top risk factors for readmission:  caregiver stress, ineffective coping    Home Health orders at discharge: none    [facility] ordered at discharge: none    Medication(s):   New Medications at Discharge: resume ASA 81 mg daily  Changed Medications at Discharge: none  Discontinued Medications at Discharge: none    Medication reconciliation was performed with patient, who verbalizes understanding of administration of home medications.   There were no barriers to obtaining medications identified at this time. Referral to Pharm D needed: no     Current Outpatient Medications   Medication Sig    aspirin 81 mg chewable tablet Take 1 Tab by mouth daily.  atorvastatin (LIPITOR) 40 mg tablet Take 40 mg by mouth nightly.  clopidogrel (PLAVIX) 75 mg tab Take 75 mg by mouth nightly.  gabapentin (NEURONTIN) 300 mg capsule TAKE 1 CAPSULE BY MOUTH THREE TIMES DAILY AS NEEDED FOR PAIN MAX  DAILY  AMOUNT  900  MG     No current facility-administered medications for this visit. There are no discontinued medications. [facility] follow up appointment(s):   Future Appointments   Date Time Provider Marsha Mary   10/18/2019 11:00 AM Chuck [address] [place] [address]    Neurology- she has called the office and they are calling her back with her 2 wk follow up appointment. Non-[facility] follow up appointment(s): NA at this time  [facility]  [facility]     Attends follow-up appointments as directed. 10/17/19-   Future Appointments   Date Time Provider Marsha Mary   10/18/2019 11:00 AM Elmira Woods, DO [facility] [address]   she states that she has called neurology office and they are going to call her back with appointment for her 2 wk f/u. [facility]        Understands red flags post discharge. 10/17/19-  She states that she is tired but overall feeling well. Reports no symptoms at this time. Discussion with Ms. Lucero Sarah about that she will most likely need to be on both ASA 81 and plavix life-long. Do not stop unless there are concerns about bleeding. Discussion about risk factor reduction:   · She states that she knows that her BP was high in the hospital and she acknowledges that she has high values when she is \"upset\" but states that otherwise her values are good. · She states that she is a hyper person and quick to [employer] react\" feels a lot of her stress is self-inducted to these character traits.  Discussion potential for caregiver stress- she does take care of her boyfriend who has dementia- she used to work in home health and states that she is familiar with resources to help. She states that she has started to slow down and plans on slowing down to take better care of herself. Encouraged her to take care of herself and make changes and ask for help. Explained that respid care is available through the [employer]. PLAN- Relayed above to PCP for tomorrow's visit. Will mail out resources.   [facility]

## 2019-10-17 NOTE — PROGRESS NOTES
Visit documented 10/17/19  Spiritual Care Partner Volunteer visited patient in Med Surg on 10/16/19.   Documented by: Karma Borden, MS., [address] ([id number])

## 2019-10-18 ENCOUNTER — OFFICE VISIT (OUTPATIENT)
Dept: FAMILY MEDICINE CLINIC | Age: 69
End: 2019-10-18

## 2019-10-18 VITALS
WEIGHT: 155 LBS | OXYGEN SATURATION: 98 % | HEIGHT: 65 IN | BODY MASS INDEX: 25.83 KG/M2 | RESPIRATION RATE: 18 BRPM | HEART RATE: 77 BPM | TEMPERATURE: 98.2 F | SYSTOLIC BLOOD PRESSURE: 128 MMHG | DIASTOLIC BLOOD PRESSURE: 82 MMHG

## 2019-10-18 DIAGNOSIS — G45.9 TIA (TRANSIENT ISCHEMIC ATTACK): Primary | ICD-10-CM

## 2019-10-18 NOTE — PROGRESS NOTES
Chief Complaint   Patient presents with   [facility] Follow Up     Patient presents in office today for hospital f/u from [facility]. Admitted on 10/15/19 for TIA. Discharged on 10/17/19. No concerns. 1. Have you been to the ER, urgent care clinic since your last visit? Hospitalized since your last visit? Yes When: 10/15/19 Where: [facility] Reason for visit: TIA    2. Have you seen or consulted any other health care providers outside of the [address] [place] [address] since your last visit? Include any pap smears or colon screening.  No    Learning Assessment 10/4/2018   PRIMARY LEARNER Patient   HIGHEST LEVEL OF EDUCATION - PRIMARY LEARNER  -   BARRIERS PRIMARY LEARNER -   CO-LEARNER CAREGIVER -   PRIMARY LANGUAGE ENGLISH   LEARNER PREFERENCE PRIMARY READING   ANSWERED BY Tasneem RAHMAN SELF

## 2019-10-18 NOTE — PROGRESS NOTES
Omega Mitchell is a 70 y.o. female   Chief Complaint   Patient presents with   [facility] Follow Up    pt here for follow up from recent hospitalization and states she could noit write her name so had rescue squad called. All symptoms have completely resolved. Pt had been off of asa since 2018 but was on plavix 75. She will now stay on asa plavix. This is her 3rd TIA. Chol is well controlled on lipitor 40mg. Had dexa thru obgyn nd has q5 years. she is a 70y.o. year old female who presents for evalution. Reviewed PmHx, RxHx, FmHx, SocHx, AllgHx and updated and dated in the chart. Review of Systems - negative except as listed above in the HPI    Objective:     Vitals:    10/18/19 1039   BP: 128/82   Pulse: 77   Resp: 18   Temp: 98.2 °F (36.8 °C)   TempSrc: Oral   SpO2: 98%   Weight: 155 lb (70.3 kg)   Height: 5' 5\" (1.651 m)       Current Outpatient Medications   Medication Sig    aspirin 81 mg chewable tablet Take 1 Tab by mouth daily.  atorvastatin (LIPITOR) 40 mg tablet Take 40 mg by mouth nightly.  clopidogrel (PLAVIX) 75 mg tab Take 75 mg by mouth nightly.  gabapentin (NEURONTIN) 300 mg capsule TAKE 1 CAPSULE BY MOUTH THREE TIMES DAILY AS NEEDED FOR PAIN MAX  DAILY  AMOUNT  900  MG     No current facility-administered medications for this visit.         Physical Examination: General appearance - alert, well appearing, and in no distress  Eyes - pupils equal and reactive, extraocular eye movements intact  Chest - clear to auscultation, no wheezes, rales or rhonchi, symmetric air entry  Heart - normal rate, regular rhythm, normal S1, S2, no murmurs, rubs, clicks or gallops  Neurological - alert, oriented, normal speech, no focal findings or movement disorder noted, cranial nerves II through XII intact, normal muscle tone, no tremors, strength 5/5  Musculoskeletal - no joint tenderness, deformity or swelling  Extremities - peripheral pulses normal, no pedal edema, no clubbing or cyanosis Assessment/ Plan:   Diagnoses and all orders for this visit:    1. TIA (transient ischemic attack)  Symptoms completely resolved. Discussed need to stay on asa plavix     Follow-up and Dispositions    · Return in about 6 months (around 4/18/2020), or if symptoms worsen or fail to improve. I have discussed the diagnosis with the patient and the intended plan as seen in the above orders. The patient has received an after-visit summary and questions were answered concerning future plans. Pt conveyed understanding of plan.     Medication Side Effects and Warnings were discussed with patient      Karthik Boland DO

## 2019-10-18 NOTE — PATIENT INSTRUCTIONS
Transient Ischemic Attack: Care Instructions  Your Care Instructions    A transient ischemic attack (TIA) is when blood flow to a part of your brain is blocked for a short time. A TIA is like a stroke but usually lasts only a few minutes. A TIA does not cause lasting brain damage. Any vision problems, slurred speech, or other symptoms usually go away in 10 to 20 minutes. But they may last for up to 24 hours. TIAs are often warning signs of a stroke. Some people who have a TIA may have a stroke in the future. A stroke can cause symptoms like those of a TIA. But a stroke causes lasting damage to your brain. You can take steps to help prevent a stroke. One thing you can do is get early treatment. If you have other new symptoms, or if your symptoms do not get better, go back to the emergency room or call your doctor right away. Getting treatment right away may prevent long-term brain damage caused by a stroke. The doctor has checked you carefully, but problems can develop later. If you notice any problems or new symptoms, get medical treatment right away. Follow-up care is a key part of your treatment and safety. Be sure to make and go to all appointments, and call your doctor if you are having problems. It's also a good idea to know your test results and keep a list of the medicines you take. How can you care for yourself at home? Medicines    · Be safe with medicines. Take your medicines exactly as prescribed. Call your doctor if you think you are having a problem with your medicine.     · If you take a blood thinner, such as aspirin, be sure you get instructions about how to take your medicine safely.  Blood thinners can cause serious bleeding problems.     · Call your doctor if you are not able to take your medicines for any reason.     · Do not take any over-the-counter medicines or herbal products without talking to your doctor first.     · If you take birth control pills or hormone therapy, talk to your doctor. Ask if these treatments are right for you.    Lifestyle changes    · Do not smoke. If you need help quitting, talk to your doctor about stop-smoking programs and medicines.     · Be active. If your doctor recommends it, get more exercise. Walking is a good choice. Bit by bit, increase the amount you walk every day. Try for at least 30 minutes on most days of the week. You also may want to swim, bike, or do other activities.     · Eat heart-healthy foods. These include fruits, vegetables, high-fiber foods, fish, and foods that are low in sodium, saturated fat, and trans fat.     · Stay at a healthy weight. Lose weight if you need to.     · Limit alcohol to 2 drinks a day for men and 1 drink a day for women.    Staying healthy    · Manage other health problems such as diabetes, high blood pressure, and high cholesterol.     · Get the flu vaccine every year. When should you call for help? Call 911 anytime you think you may need emergency care. For example, call if:    · You have new or worse symptoms of a stroke. These may include:  ? Sudden numbness, tingling, weakness, or loss of movement in your face, arm, or leg, especially on only one side of your body. ? Sudden vision changes. ? Sudden trouble speaking. ? Sudden confusion or trouble understanding simple statements. ? Sudden problems with walking or balance. ? A sudden, severe headache that is different from past headaches. Call 911 even if these symptoms go away in a few minutes.     · You feel like you are having another TIA.    Watch closely for changes in your health, and be sure to contact your doctor if you have any problems. Where can you learn more? Go to http://arturo-mckinley.info/. Enter ([phone] [phone] in the search box to learn more about \"Transient Ischemic Attack: Care Instructions. \"  Current as of: September 26, 2018  Content Version: 12.2  © 5804-7292 [employer].  Care instructions adapted under license by [employer] (which disclaims liability or warranty for this information). If you have questions about a medical condition or this instruction, always ask your healthcare professional. [address] any warranty or liability for your use of this information.

## 2019-11-06 ENCOUNTER — OFFICE VISIT (OUTPATIENT)
Dept: NEUROLOGY | Age: 69
End: 2019-11-06

## 2019-11-06 VITALS
RESPIRATION RATE: 18 BRPM | SYSTOLIC BLOOD PRESSURE: 130 MMHG | WEIGHT: 158.7 LBS | BODY MASS INDEX: 26.44 KG/M2 | HEIGHT: 65 IN | DIASTOLIC BLOOD PRESSURE: 82 MMHG | OXYGEN SATURATION: 98 % | HEART RATE: 85 BPM

## 2019-11-06 DIAGNOSIS — G45.9 TIA (TRANSIENT ISCHEMIC ATTACK): Primary | ICD-10-CM

## 2019-11-06 NOTE — PATIENT INSTRUCTIONS
[facility]   Statement to Patients  April 1, 2014      In an effort to ensure the large volume of patient prescription refills is processed in the most efficient and expeditious manner, we are asking our patients to assist us by calling your Pharmacy for all prescription refills, this will include also your  Mail Order Pharmacy. The pharmacy will contact our office electronically to continue the refill process. Please do not wait until the last minute to call your pharmacy. We need at least 48 hours (2days) to fill prescriptions. We also encourage you to call your pharmacy before going to  your prescription to make sure it is ready. With regard to controlled substance prescription refill requests (narcotic refills) that need to be picked up at our office, we ask your cooperation by providing us with at least 72 hours (3days) notice that you will need a refill. We will not refill narcotic prescription refill requests after 4:00pm on any weekday, Monday through Thursday, or after 2:00pm on Fridays, or on the weekends. We encourage everyone to explore another way of getting your prescription refill request processed using [employer], our patient web portal through our electronic medical record system. [employer] is an efficient and effective way to communicate your medication request directly to the office and  downloadable as an mike on your smart phone . [employer] also features a review functionality that allows you to view your medication list as well as leave messages for your physician. Are you ready to get connected? If so please review the attatched instructions or speak to any of our staff to get you set up right away! Thank you so much for your cooperation. Should you have any questions please contact our Practice Administrator.     The Physicians and Staff,  [facility]

## 2019-11-06 NOTE — PROGRESS NOTES
[address] [facility],5Th [address]  [address] Emila Fieldorfa "[address]   [place] [address] [facility]   [phone] Office   [phone] Fax           Date:  19     Name:  Eloisa Nunez  :  1950  MRN:  [id number]     PCP:  Mariel Pierce MD    Chief Complaint   Patient presents with   [facility] Follow Up     10/2019  [facility]  TIA    Sleep Problem     HISTORY OF PRESENT ILLNESS: Ms. Andrew Villalpando is a 68yo, right handed,  woman who presents today for follow up from recent TIA in 2019. I have previously seen Ms. Bridges for similar complaints about one year ago. This would be her fourth presentation for possible TIA/CVA. She presented to the ER with slurred speech, altered mental status, right-sided numbness, was admitted for stroke work up which was essentially negative. She is back to her baseline at this point. Upon discharge, she was instructed to continue Atorvastatin and add an 81mg ASA to the Plavix. Stroke work up  · A1C:  5.8  · LDL:  50.8  · TTE:  Normal cavity size, wall thickness and systolic function (ejection fraction normal). Estimated left ventricular ejection fraction is 56 - 60%. · MRI:No acute intracranial abnormality. · Carotid vascular imaging:  No significant stenosis  · CT Head: No acute findings  · CTA Head and Neck: No evidence for acute large vessel arterial occlusion or significant stenosis  · EKG:  Sinus rhythm with occasional premature ventricular complexes    Except as noted above, denies  fever, chills, cough. No CP or SOB. No dysuria, loss of bowel or bladder control. No Weight loss. Appetite good. Sleeping well. No sweats. No edema. No bruising or bleeding. No nausea or vomit. No diarrhea. No frequency, urgency, No depressive sxs. No anxiety. Denies sore throat, nasal congestion, nasal discharge, epistaxis, tinnitus, hearing loss, back pain, muscle pain, or joint pain.        Current Outpatient Medications   Medication Sig    aspirin 81 mg chewable tablet Take 1 Tab by mouth daily.  atorvastatin (LIPITOR) 40 mg tablet Take 40 mg by mouth nightly.  clopidogrel (PLAVIX) 75 mg tab Take 75 mg by mouth nightly.  gabapentin (NEURONTIN) 300 mg capsule TAKE 1 CAPSULE BY MOUTH THREE TIMES DAILY AS NEEDED FOR PAIN MAX  DAILY  AMOUNT  900  MG     No current facility-administered medications for this visit.       Allergies   Allergen Reactions    Ampicillin Hives    Bacitracin Other (comments)    Bactrim [Sulfamethoprim] Hives     Past Medical History:   Diagnosis Date    [facility]     [facility]ch[facility]ocytic [facility]) ([facility]     Coronary artery disease     Dyslipidemia     TIA (transient ischemic attack) 9/2015     Past Surgical History:   Procedure Laterality Date    BREAST SURGERY PROCEDURE UNLISTED      HX GYN  2004    [address]    HX OTHER SURGICAL  1998    ganglion cyst removed- wrist R    HX OTHER SURGICAL  1998    breast surgery    HX OTHER SURGICAL  1990    fibrodema (left breast)     HX OTHER SURGICAL  1999    ganglin cyst of the right hand      Social History     Socioeconomic History    Marital status:      Spouse name: Not on file    Number of children: Not on file    Years of education: Not on file    Highest education level: Not on file   Occupational History    Not on file   Social Needs    Financial resource strain: Not on file    Food insecurity:     Worry: Not on file     Inability: Not on file    Transportation needs:     Medical: Not on file     Non-medical: Not on file   Tobacco Use    Smoking status: Never Smoker    Smokeless tobacco: Never Used   Substance and Sexual Activity    Alcohol use: No     Frequency: Patient refused    Drug use: No    Sexual activity: Not Currently   Lifestyle    Physical activity:     Days per week: Not on file     Minutes per session: Not on file    Stress: Not on file   Relationships    Social connections: Talks on phone: Not on file     Gets together: Not on file     Attends Sikh service: Not on file     Active member of club or organization: Not on file     Attends meetings of clubs or organizations: Not on file     Relationship status: Not on file    Intimate partner violence:     Fear of current or ex partner: Not on file     Emotionally abused: Not on file     Physically abused: Not on file     Forced sexual activity: Not on file   Other Topics Concern    Not on file   Social History Narrative    ** Merged History Encounter **          Family History   Problem Relation Age of Onset    Cancer Mother         renal, bladder       PHYSICAL EXAMINATION:    Visit Vitals  /82   Pulse 85   Resp 18   Ht 5' 5\" (1.651 m)   Wt 72 kg (158 lb 11.2 oz)   SpO2 98%   BMI 26.41 kg/m²     General:  Well defined, nourished, and groomed individual in no acute distress. Neck: Supple, nontender, no bruits, no pain with resistance to active range of motion. Heart: Regular rate and rhythm, no murmurs, rub, or gallop. Normal S1S2. Lungs:  Clear to auscultation bilaterally with equal chest expansion, no cough, no wheeze  Musculoskeletal:  Extremities revealed no edema and had full range of motion of joints. Psych:  Good mood and bright affect    NEUROLOGICAL EXAMINATION:     Mental Status:   Alert and oriented to person, place, and time with recent and remote memory intact. Attention span and concentration are normal. Speech is fluent with a full fund of knowledge.       Cranial Nerves:  I: smell Not tested   II: visual fields Full to confrontation   II: pupils Equal, round, reactive to light   II: optic disc No papilledema   III,VII: ptosis none   III,IV,VI: extraocular muscles  Full ROM   V: mastication normal   V: facial light touch sensation  normal   VII: facial muscle function   symmetric   VIII: hearing symmetric   IX: soft palate elevation  normal   XI: trapezius strength  5/5   XI: sternocleidomastoid strength 5/5   XI: neck flexion strength  5/5   XII: tongue  midline     Motor Examination:     Normal tone, bulk, and strength, 5/5 muscle strength throughout.       Sensory exam:  Normal throughout      Coordination:  Heel-to-shin was smooth and symmetrical bilaterally. Finger to nose and rapid arm movement testing was normal.   No resting or intention tremor     Gait and Station:  Steady while walking. Normal arm swing. No Rhomberg or pronator drift. No muscle wasting or fasiculations noted.       Reflexes:  DTRs 2+ throughout. ASSESSMENT AND PLAN    ICD-10-CM ICD-9-CM    1. TIA (transient ischemic attack) G45.9 435.9 REFERRAL TO CARDIOLOGY     Ms. Bettie Dudley presented today for follow up after recent hospitalization and suspected TIA. This would be her third event in four years without any significant finding. For further evaluation of possible paroxsymal arhythmia to explain her events, I would like to refer her to electrophysiological cardiology. Otherwise, she should continue with secondary stroke prevention. Remain on ASA and Plavix for now. Continue with Lipitor as previously instructed. Follow up in neurology as needed. Annamaria Gil

## 2019-11-08 ENCOUNTER — OFFICE VISIT (OUTPATIENT)
Dept: CARDIOLOGY CLINIC | Age: 69
End: 2019-11-08

## 2019-11-08 VITALS
RESPIRATION RATE: 16 BRPM | HEART RATE: 72 BPM | DIASTOLIC BLOOD PRESSURE: 82 MMHG | HEIGHT: 65 IN | SYSTOLIC BLOOD PRESSURE: 130 MMHG | BODY MASS INDEX: 26.66 KG/M2 | WEIGHT: 160 LBS | OXYGEN SATURATION: 96 %

## 2019-11-08 DIAGNOSIS — R56.9 SEIZURE (HCC): ICD-10-CM

## 2019-11-08 DIAGNOSIS — R42 DIZZINESS: ICD-10-CM

## 2019-11-08 DIAGNOSIS — G45.9 TIA (TRANSIENT ISCHEMIC ATTACK): Primary | ICD-10-CM

## 2019-11-08 NOTE — PROGRESS NOTES
HISTORY OF PRESENTING ILLNESS      Tasneem Waldrop is a 70 y.o. female with history of multiple TIAs/possible stroke who is referred for further evaluation of arrhythmia in the setting of her cryptogenic stroke. She had echocardiogram which demonstrated preserved LV function. She denies cardiac complaints.         ACTIVE PROBLEM LIST     Patient Active Problem List    Diagnosis Date Noted    Seizure [facility]) 10/15/2019    Acute confusional state 10/15/2019    TIA (transient ischemic attack) 09/12/2018    High cholesterol 08/13/2018    Stroke syndrome 04/24/2018    Advance care planning 07/27/2017    Vertigo 06/13/2017    SOB (shortness of breath) 12/18/2015    Dizziness 12/18/2015           PAST MEDICAL HISTORY     Past Medical History:   Diagnosis Date    [facility]     CLL (chronic lymphocytic leukemia) ([facility]     Coronary artery disease     Dyslipidemia     TIA (transient ischemic attack) 9/2015           PAST SURGICAL HISTORY     Past Surgical History:   Procedure Laterality Date    BREAST SURGERY PROCEDURE UNLISTED      HX GYN  2004    [address]    HX OTHER SURGICAL  1998    ganglion cyst removed- wrist R    HX OTHER SURGICAL  1998    breast surgery    HX OTHER SURGICAL  1990    fibrodema (left breast)     HX OTHER SURGICAL  1999    ganglin cyst of the right hand           ALLERGIES     Allergies   Allergen Reactions    Ampicillin Hives    Bacitracin Other (comments)    Bactrim [Sulfamethoprim] Hives          FAMILY HISTORY     Family History   Problem Relation Age of Onset    Cancer Mother         renal, bladder    negative for cardiac disease       SOCIAL HISTORY     Social History     Socioeconomic History    Marital status:      Spouse name: Not on file    Number of children: Not on file    Years of education: Not on file    Highest education level: Not on file   Tobacco Use    Smoking status: Never Smoker    Smokeless tobacco: Never Used   Substance and Sexual Activity  Alcohol use: No     Frequency: Patient refused    Drug use: No    Sexual activity: Not Currently   Social History Narrative    ** Merged History Encounter **              MEDICATIONS     Current Outpatient Medications   Medication Sig    aspirin 81 mg chewable tablet Take 1 Tab by mouth daily.  atorvastatin (LIPITOR) 40 mg tablet Take 40 mg by mouth nightly.  clopidogrel (PLAVIX) 75 mg tab Take 75 mg by mouth nightly.  gabapentin (NEURONTIN) 300 mg capsule TAKE 1 CAPSULE BY MOUTH THREE TIMES DAILY AS NEEDED FOR PAIN MAX  DAILY  AMOUNT  900  MG     No current facility-administered medications for this visit. I have reviewed the nurses notes, vitals, problem list, allergy list, medical history, family, social history and medications. REVIEW OF SYMPTOMS      General: Pt denies excessive weight gain or loss. Pt is able to conduct ADL's  HEENT: Denies blurred vision, headaches, hearing loss, epistaxis and difficulty swallowing. Respiratory: Denies cough, congestion, shortness of breath, SERRANO, wheezing or stridor. Cardiovascular: Denies precordial pain, palpitations, edema or PND  Gastrointestinal: Denies poor appetite, indigestion, abdominal pain or blood in stool  Genitourinary: Denies hematuria, dysuria, increased urinary frequency  Musculoskeletal: Denies joint pain or swelling from muscles or joints  Neurologic: Denies tremor, paresthesias, headache, or sensory motor disturbance  Psychiatric: Denies confusion, insomnia, depression  Integumentray: Denies rash, itching or ulcers. Hematologic: Denies easy bruising, bleeding       PHYSICAL EXAMINATION      Vitals:    11/08/19 1413   BP: 130/82   Pulse: 72   Resp: 16   SpO2: 96%   Weight: 160 lb (72.6 kg)   Height: 5' 5\" (1.651 m)     General: Well developed, in no acute distress. HEENT: No jaundice, oral mucosa moist, no oral ulcers  Neck: Supple, no stiffness, no lymphadenopathy, supple  Heart:  Normal S1/S2 negative S3 or S4.  Regular, no murmur, gallop or rub, no jugular venous distention  Respiratory: Clear bilaterally x 4, no wheezing or rales  Abdomen:   Soft, non-tender, bowel sounds are active.   Extremities:  No edema, normal cap refill, no cyanosis. Musculoskeletal: No clubbing, no deformities  Neuro: A&Ox3, speech clear, gait stable, cooperative, no focal neurologic deficits  Skin: Skin color is normal. No rashes or lesions. Non diaphoretic, moist.  Vascular: 2+ pulses symmetric in all extremities       DIAGNOSTIC DATA      EKG:        LABORATORY DATA      Lab Results   Component Value Date/Time    WBC 10.7 10/16/2019 10:17 AM    HGB 13.8 10/16/2019 10:17 AM    HCT 41.7 10/16/2019 10:17 AM    PLATELET 237 02/03/9246 10:17 AM    MCV 88.0 10/16/2019 10:17 AM      Lab Results   Component Value Date/Time    Sodium 143 10/16/2019 10:17 AM    Potassium 3.9 10/16/2019 10:17 AM    Chloride 110 (H) 10/16/2019 10:17 AM    CO2 27 10/16/2019 10:17 AM    Anion gap 6 10/16/2019 10:17 AM    Glucose 124 (H) 10/16/2019 10:17 AM    BUN 10 10/16/2019 10:17 AM    Creatinine 0.86 10/16/2019 10:17 AM    BUN/Creatinine ratio 12 10/16/2019 10:17 AM    GFR est AA >60 10/16/2019 10:17 AM    GFR est non-AA >60 10/16/2019 10:17 AM    Calcium 8.8 10/16/2019 10:17 AM    Bilirubin, total 0.5 10/15/2019 06:55 PM    AST (SGOT) 22 10/15/2019 06:55 PM    Alk. phosphatase 83 10/15/2019 06:55 PM    Protein, total 7.0 10/15/2019 06:55 PM    Albumin 4.0 10/15/2019 06:55 PM    Globulin 3.0 10/15/2019 06:55 PM    A-G Ratio 1.3 10/15/2019 06:55 PM    ALT (SGPT) 24 10/15/2019 06:55 PM           ASSESSMENT      1. Cryptogenic stroke  2. CAD  3. Dyslipidemia       PLAN     Plan for implantable loop monitor ([id number]) for further evaluation of arrhythmia. FOLLOW-UP     2 weeks post procedure     Thank you, Anali Merritt MD and Andra Gonzales NP for allowing me to participate in the care of this extraordinarily pleasant female.  Please do not hesitate to contact me for further questions/concerns. Cherelle Hernandes NP    Patient seen and examined by me with nurse practitioner. I personally performed all components of the history, physical, and medical decision making and agree with the assessment and plan with minor modifications as noted.        Ryan Ventura MD  Cardiac Electrophysiology / Cardiology    [address]  [address] [place] [address]  [phone] / [phone] Fax   [phone] / [phone] Fax

## 2019-11-08 NOTE — PROGRESS NOTES
ROOM # 2  [facility]: 10/15-10/16/19- TIA  Denies any cardiac complaints at this time.   Visit Vitals  /82 (BP 1 Location: Left arm, BP Patient Position: Sitting)   Pulse 72   Resp 16   Ht 5' 5\" (1.651 m)   Wt 160 lb (72.6 kg)   SpO2 96%   BMI 26.63 kg/m²

## 2019-11-15 ENCOUNTER — TELEPHONE (OUTPATIENT)
Dept: CARDIOLOGY CLINIC | Age: 69
End: 2019-11-15

## 2019-11-15 NOTE — TELEPHONE ENCOUNTER
Pt was calling to inquire about setting up an appt to get an inplantable loop monitor. Please advise.     Phone: [phone]

## 2019-11-29 RX ORDER — DOXYCYCLINE HYCLATE 100 MG
100 TABLET ORAL ONCE
Status: CANCELLED | OUTPATIENT
Start: 2019-11-29 | End: 2019-11-30

## 2019-11-29 RX ORDER — DIAZEPAM 5 MG/1
5 TABLET ORAL ONCE
Status: CANCELLED | OUTPATIENT
Start: 2019-12-02 | End: 2019-12-02

## 2019-12-02 ENCOUNTER — HOSPITAL ENCOUNTER (OUTPATIENT)
Age: 69
Setting detail: OUTPATIENT SURGERY
Discharge: HOME | End: 2019-12-02
Attending: INTERNAL MEDICINE | Admitting: INTERNAL MEDICINE
Payer: MEDICARE

## 2019-12-02 VITALS — HEIGHT: 65 IN | WEIGHT: 165.34 LBS | BODY MASS INDEX: 27.55 KG/M2

## 2019-12-02 DIAGNOSIS — I63.9 CRYPTOGENIC STROKE (HCC): ICD-10-CM

## 2019-12-02 PROCEDURE — C1764 EVENT RECORDER, CARDIAC: HCPCS | Performed by: INTERNAL MEDICINE

## 2019-12-02 PROCEDURE — 33285 INSJ SUBQ CAR RHYTHM MNTR: CPT | Performed by: INTERNAL MEDICINE

## 2019-12-02 PROCEDURE — 74011250637 HC RX REV CODE- 250/637: Performed by: INTERNAL MEDICINE

## 2019-12-02 PROCEDURE — 74011000250 HC RX REV CODE- 250: Performed by: INTERNAL MEDICINE

## 2019-12-02 PROCEDURE — 77030012935 HC DRSG AQUACEL BMS -B: Performed by: INTERNAL MEDICINE

## 2019-12-02 DEVICE — MON LNQ11 REVEAL LINQ USA FW2.0
Type: IMPLANTABLE DEVICE | Status: FUNCTIONAL
Brand: REVEAL LINQ™

## 2019-12-02 RX ORDER — DOXYCYCLINE HYCLATE 100 MG
100 TABLET ORAL ONCE
Status: COMPLETED | OUTPATIENT
Start: 2019-12-02 | End: 2019-12-02

## 2019-12-02 RX ORDER — DIAZEPAM 5 MG/1
5 TABLET ORAL ONCE
Status: DISCONTINUED | OUTPATIENT
Start: 2019-12-02 | End: 2019-12-02 | Stop reason: HOSPADM

## 2019-12-02 RX ORDER — DOXYCYCLINE 100 MG/1
100 TABLET ORAL 2 TIMES DAILY
Qty: 14 TAB | Refills: 0 | Status: SHIPPED | OUTPATIENT
Start: 2019-12-02 | End: 2019-12-09

## 2019-12-02 RX ADMIN — DOXYCYCLINE HYCLATE 100 MG: 100 TABLET, COATED ORAL at 12:45

## 2019-12-02 NOTE — DISCHARGE INSTRUCTIONS
Loop Recorder  Discharge Instructions    Please make sure you have received your Temporary Loop Recorder identification card with your discharge instructions      MEDICATIONS         Take only the medications prescribed to you at discharge. ACTIVITY         Return to your normal activity, except as noted below. o Avoid tight clothes or unnecessary pressure over your incision (such as bra straps or seat belts). If it is tender or sensitive to clothing, cover the incision with a soft dressing or pad.  o Questions about driving are individualized and should be discussed with one of the EP Physicians prior to discharge. SHOWERING         Leave the bandage over your incision for 7 days after the Loop Recorder implant. You bandage will be removed in clinic in 7 days.  It is important to keep the bandaged area clean and dry. You may shower around the site until the bandage is removed in clinic. Thereafter, you may shower after the bandage is removed, washing it gently with soap and water. Do not apply any lotions, powders, or perfumes to the incision line.  Avoid submerging your incision in water (tub baths, hot tubs, or swimming) for two weeks.  Underneath the dressing. o If you have white steri-strips over your incision (underneath the gauze dressing), they will curl up at the end and fall off, usually within 10 days. Do not pull them off.  - OR -   o You may have a different type of closure for the incision. If Dermabond Adhesive was used to close your incision, you will receive a separate instruction sheet. DISCHARGE PRECAUTIONS         Record your temperature every day, at the same time, for 3 weeks after your implant. A temperature of 100.5 F, or higher, can be the first sign of infection. This should be reported to your Doctor immediately.  You can have an MRI after 6 weeks. You must be aware that any strong magnet or magnetic field can affect your Loop Recorder.   In general, be careful of metal detectors, heavy machinery, and any area where arc-welding is performed. Avoid metal detectors such as the ones in security checkpoints at [facility] or [address] [place] [address]. When approaching a security checkpoint show your Loop Recorder ID Card to security personnel and ask to be hand searched.  Always tell your doctor or dentist that you have a Loop Recorder. Antibiotics may be prescribed before certain procedures.  Your temporary identification will be given to you with these instructions. Keep your device card in your wallet or on your person at all times. You should receive your device in 8 weeks. If you do not receive your permanent card, please call the office at [phone]. TAKING YOUR PULSE         Take your pulse the same time every day, preferably in the morning.  Sit down and rest for 5 minutes prior to taking your pulse.  Take your pulse for 1 full minute, use a clock or stop watch with a second hand.  To feel your pulse, use the first two fingers of one hand; place them on the thumb side of the wrist of the opposite hand. The pulse will be steady, regular and throbbing.  Call the EP Lab Doctors if your pulse is less than 40 beats per minute. SYMPTOMS THAT NEED TO BE REPORTED IMMEDIATELY         Temperature more than 100.4 F     Redness or warmth at the incision site, or pain for longer than the first 5 days after the implant.  Drainage from the incision site.  Swelling around the incision site.  Shortness of breath.  Rapid heart rate or palpitations.  Dizziness, lightheadedness, fainting.  Slow pulse below 40 beats per minute.  REMEMBER: If you feel something is an emergency or cannot be handled over the phone, call 911 or go to the closest emergency room.           Roberta Lynn MD  Cardiac Electrophysiology / Cardiology    [address] [place] [address] [address] [facility], [address] [facility], [address] [place] [address],[address]  [address]         Kayla Eubanksmoanabel  [phone] / [phone] Fax       [phone] / [phone] Fax

## 2019-12-02 NOTE — PROGRESS NOTES
1220 Received patient from waiting area. Armband and allergies verbally confirmed with patient. Procedure explained and consents signed. [phone] [id number] Dr. Pascual Ferrari in to see, rep explaining how to use bedside monitor pt refused the valium 1340 reviewed discharge instructions with pt she understands and has no questions Patient escorted via wheelchair to entrance. friend driving. Patient discharged into care of friend dressing dry in tact.

## 2019-12-02 NOTE — H&P
HISTORY OF PRESENTING ILLNESS      Tasneem Goff is a 70 y.o. female with history of multiple TIAs/possible stroke who is referred for further evaluation of arrhythmia in the setting of her cryptogenic stroke. She had echocardiogram which demonstrated preserved LV function.  She denies cardiac complaints.          ACTIVE PROBLEM LIST           Patient Active Problem List     Diagnosis Date Noted    Seizure ([facility] 10/15/2019    Acute confusional state 10/15/2019    TIA (transient ischemic attack) 09/12/2018    High cholesterol 08/13/2018    Stroke syndrome 04/24/2018    Advance care planning 07/27/2017    Vertigo 06/13/2017    SOB (shortness of breath) 12/18/2015    Dizziness 12/18/2015             PAST MEDICAL HISTORY      Past Medical History:   Diagnosis Date    Cancer ([facility]      [facility]      Coronary artery disease      Dyslipidemia      TIA (transient ischemic attack) 9/2015             PAST SURGICAL HISTORY            Past Surgical History:   Procedure Laterality Date    BREAST SURGERY PROCEDURE UNLISTED        HX GYN   2004     DNC    HX OTHER SURGICAL   1998     ganglion cyst removed- wrist R    HX OTHER SURGICAL   1998     breast surgery    HX OTHER SURGICAL   1990     fibrodema (left breast)     HX OTHER SURGICAL   1999     ganglin cyst of the right hand              ALLERGIES           Allergies   Allergen Reactions    Ampicillin Hives    Bacitracin Other (comments)    Bactrim [Sulfamethoprim] Hives            FAMILY HISTORY            Family History   Problem Relation Age of Onset    Cancer Mother           renal, bladder    negative for cardiac disease         SOCIAL HISTORY      Social History               Socioeconomic History    Marital status:        Spouse name: Not on file    Number of children: Not on file    Years of education: Not on file    Highest education level: Not on file   Tobacco Use    Smoking status: Never Smoker    Smokeless tobacco: Never Used   Substance and Sexual Activity    Alcohol use: No       Frequency: Patient refused    Drug use: No    Sexual activity: Not Currently   Social History Narrative     ** Merged History Encounter **                     MEDICATIONS           Current Outpatient Medications   Medication Sig    aspirin 81 mg chewable tablet Take 1 Tab by mouth daily.  atorvastatin (LIPITOR) 40 mg tablet Take 40 mg by mouth nightly.  clopidogrel (PLAVIX) 75 mg tab Take 75 mg by mouth nightly.  gabapentin (NEURONTIN) 300 mg capsule TAKE 1 CAPSULE BY MOUTH THREE TIMES DAILY AS NEEDED FOR PAIN MAX  DAILY  AMOUNT  900  MG      No current facility-administered medications for this visit.          I have reviewed the nurses notes, vitals, problem list, allergy list, medical history, family, social history and medications.         REVIEW OF SYMPTOMS      General: Pt denies excessive weight gain or loss. Pt is able to conduct ADL's  HEENT: Denies blurred vision, headaches, hearing loss, epistaxis and difficulty swallowing. Respiratory: Denies cough, congestion, shortness of breath, SERRANO, wheezing or stridor. Cardiovascular: Denies precordial pain, palpitations, edema or PND  Gastrointestinal: Denies poor appetite, indigestion, abdominal pain or blood in stool  Genitourinary: Denies hematuria, dysuria, increased urinary frequency  Musculoskeletal: Denies joint pain or swelling from muscles or joints  Neurologic: Denies tremor, paresthesias, headache, or sensory motor disturbance  Psychiatric: Denies confusion, insomnia, depression  Integumentray: Denies rash, itching or ulcers. Hematologic: Denies easy bruising, bleeding         PHYSICAL EXAMINATION          Vitals:     11/08/19 1413   BP: 130/82   Pulse: 72   Resp: 16   SpO2: 96%   Weight: 160 lb (72.6 kg)   Height: 5' 5\" (1.651 m)      General: Well developed, in no acute distress.   HEENT: No jaundice, oral mucosa moist, no oral ulcers  Neck: Supple, no stiffness, no lymphadenopathy, supple  Heart:  Normal S1/S2 negative S3 or S4. Regular, no murmur, gallop or rub, no jugular venous distention  Respiratory: Clear bilaterally x 4, no wheezing or rales  Abdomen:   Soft, non-tender, bowel sounds are active.   Extremities:  No edema, normal cap refill, no cyanosis. Musculoskeletal: No clubbing, no deformities  Neuro: A&Ox3, speech clear, gait stable, cooperative, no focal neurologic deficits  Skin: Skin color is normal. No rashes or lesions. Non diaphoretic, moist.  Vascular: 2+ pulses symmetric in all extremities         DIAGNOSTIC DATA      EKG:          LABORATORY DATA            Lab Results   Component Value Date/Time     WBC 10.7 10/16/2019 10:17 AM     HGB 13.8 10/16/2019 10:17 AM     HCT 41.7 10/16/2019 10:17 AM     PLATELET 400 56/48/0135 10:17 AM     MCV 88.0 10/16/2019 10:17 AM            Lab Results   Component Value Date/Time     Sodium 143 10/16/2019 10:17 AM     Potassium 3.9 10/16/2019 10:17 AM     Chloride 110 (H) 10/16/2019 10:17 AM     CO2 27 10/16/2019 10:17 AM     Anion gap 6 10/16/2019 10:17 AM     Glucose 124 (H) 10/16/2019 10:17 AM     BUN 10 10/16/2019 10:17 AM     Creatinine 0.86 10/16/2019 10:17 AM     BUN/Creatinine ratio 12 10/16/2019 10:17 AM     GFR est AA >60 10/16/2019 10:17 AM     GFR est non-AA >60 10/16/2019 10:17 AM     Calcium 8.8 10/16/2019 10:17 AM     Bilirubin, total 0.5 10/15/2019 06:55 PM     AST (SGOT) 22 10/15/2019 06:55 PM     Alk. phosphatase 83 10/15/2019 06:55 PM     Protein, total 7.0 10/15/2019 06:55 PM     Albumin 4.0 10/15/2019 06:55 PM     Globulin 3.0 10/15/2019 06:55 PM     A-G Ratio 1.3 10/15/2019 06:55 PM     ALT (SGPT) 24 10/15/2019 06:55 PM             ASSESSMENT      1. Cryptogenic stroke  2. CAD  3.  Dyslipidemia         PLAN      Plan for implantable loop monitor       Nav Cox MD  Cardiac Electrophysiology / Cardiology     [address], [address] [address] [address], [address], [address] [place] [address]                                         Kayla Eubanksjovanny  [phone] / [phone] Fax                                    [phone] / [phone] Fax

## 2019-12-02 NOTE — Clinical Note
Dressed using bacteriostatic and non-adherent dressing. Site: clean, dry, & intact, no bleeding and no hematoma.

## 2019-12-10 ENCOUNTER — TELEPHONE (OUTPATIENT)
Dept: CARDIOLOGY CLINIC | Age: 69
End: 2019-12-10

## 2019-12-10 NOTE — TELEPHONE ENCOUNTER
Pt states that she would like to speak with because he has some questions regarding her monitor she got Monday. Please advise.      Phone: [phone]

## 2019-12-10 NOTE — TELEPHONE ENCOUNTER
Returned call, ID verified using two patient identifiers. Patient states that her dressing @ her ILR implant site is peeling up around the edges. Advised patient that she can remove the dressing and cover the site with a large Band-Aid. Also advised her to keep the skin glue over the incision intact. She denies any redness, swelling, drainage, fever os chills. Patient verbalizes understanding of all information.

## 2019-12-17 NOTE — PROGRESS NOTES
Patient presents for wound check post-device implantation (ILR s/p cryptogenic stroke). The dressing was removed and the site was inspected. The site appeared to be well-healing without ecchymosis/tenderness/erythema. Denies pain, fevers, discharge. Device interrogation shows normal functioning (sinus rhythm with PVCs)    Plan:    Continue follow up in device clinic as planned. Follow up 1 year.       Dinesh Blackmon, NP

## 2019-12-18 ENCOUNTER — OFFICE VISIT (OUTPATIENT)
Dept: CARDIOLOGY CLINIC | Age: 69
End: 2019-12-18

## 2019-12-18 ENCOUNTER — CLINICAL SUPPORT (OUTPATIENT)
Dept: CARDIOLOGY CLINIC | Age: 69
End: 2019-12-18

## 2019-12-18 DIAGNOSIS — Z95.818 STATUS POST PLACEMENT OF IMPLANTABLE LOOP RECORDER: Primary | ICD-10-CM

## 2019-12-18 DIAGNOSIS — Z51.89 VISIT FOR WOUND CHECK: ICD-10-CM

## 2020-01-02 ENCOUNTER — OFFICE VISIT (OUTPATIENT)
Dept: CARDIOLOGY CLINIC | Age: 70
End: 2020-01-02

## 2020-01-02 DIAGNOSIS — Z95.818 STATUS POST PLACEMENT OF IMPLANTABLE LOOP RECORDER: Primary | ICD-10-CM

## 2020-01-15 ENCOUNTER — TELEPHONE (OUTPATIENT)
Dept: CARDIOLOGY CLINIC | Age: 70
End: 2020-01-15

## 2020-01-15 NOTE — TELEPHONE ENCOUNTER
Franklin Morales w/ [facility], Dr. Elva Griggs's office, is calling to see if there are any precautions they need to take when working with the patient as she has a loop recorder. Please advise.     Phone #: [phone]  Thanks

## 2020-01-15 NOTE — TELEPHONE ENCOUNTER
Called and spoke with [facility] w/ [facility], Dr. Alberto List Office and informed that patient will not need and any pre/post precautions for dental procedures. Letter stating No pre/post precautions was faxed to [facility].     [phone]    Mrs. Richardson voiced understanding.

## 2020-02-03 ENCOUNTER — OFFICE VISIT (OUTPATIENT)
Dept: CARDIOLOGY CLINIC | Age: 70
End: 2020-02-03

## 2020-02-03 DIAGNOSIS — Z95.818 STATUS POST PLACEMENT OF IMPLANTABLE LOOP RECORDER: Primary | ICD-10-CM

## 2020-02-28 ENCOUNTER — TELEPHONE (OUTPATIENT)
Dept: CARDIOLOGY CLINIC | Age: 70
End: 2020-02-28

## 2020-02-28 NOTE — TELEPHONE ENCOUNTER
Received pts Linq summary reports & Dr Dwain Winter wanted to know if pt was symptomatic during her episodes that were noted for tachycardia. She had 5 episodes from time period of 12/2/19 - 2/2/20 of tachycardia showing SVT. Spoke to pt & she stated she has not had any symptoms since Linq implant. Verified that the times of episodes coorelate with her being active. 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 Informed Dr Dwain Winter & he does want pt to keep her appt for 3 month s/p Linq implant with him on 3/11. Informed pt & she stated she understands.

## 2020-03-10 NOTE — PROGRESS NOTES
HISTORY OF PRESENTING ILLNESS      Tasneem Cha is a 78 y.o. female female with history of multiple TIAs/possible stroke who is referred for further evaluation of arrhythmia in the setting of her cryptogenic stroke. She had echocardiogram which demonstrated preserved LV function. She denies cardiac complaints. She underwent ILR injection. ILR had demonstrated several episodes of rapid tachycardia with HRs in the 150's. She appears to be asymptomatic during these and believes these occurred while she was outside working in the yard.         ACTIVE PROBLEM LIST     Patient Active Problem List    Diagnosis Date Noted    Seizure [facility]) 10/15/2019    Acute confusional state 10/15/2019    TIA (transient ischemic attack) 09/12/2018    High cholesterol 08/13/2018    Stroke syndrome 04/24/2018    Advance care planning 07/27/2017    Vertigo 06/13/2017    SOB (shortness of breath) 12/18/2015    Dizziness 12/18/2015           PAST MEDICAL HISTORY     Past Medical History:   Diagnosis Date    Cancer ([facility]     CLL (chronic lymphocytic leukemia) ([facility]     Coronary artery disease     Dyslipidemia     TIA (transient ischemic attack) 9/2015           PAST SURGICAL HISTORY     Past Surgical History:   Procedure Laterality Date    BREAST SURGERY PROCEDURE UNLISTED      HX GYN  2004    [address]    HX OTHER SURGICAL  1998    ganglion cyst removed- wrist R    HX OTHER SURGICAL  1998    breast surgery    HX OTHER SURGICAL  1990    fibrodema (left breast)     HX OTHER SURGICAL  1999    ganglin cyst of the right hand     [place] INSERTION SUBQ CARDIAC RHYTHM MONITOR W/[facility] N/A 12/2/2019    LOOP RECORDER INSERT performed [facility] M[facility] Dowd [facility]          ALLERGIES     Allergies   Allergen Reactions    Ampicillin Hives    Bacitracin Other (comments)    Bactrim [Sulfamethoprim] Hives          FAMILY HISTORY     Family History   Problem Relation Age of Onset    Cancer Mother         renal, bladder negative for cardiac disease       SOCIAL HISTORY     Social History     Socioeconomic History    Marital status:      Spouse name: Not on file    Number of children: Not on file    Years of education: Not on file    Highest education level: Not on file   Tobacco Use    Smoking status: Never Smoker    Smokeless tobacco: Never Used   Substance and Sexual Activity    Alcohol use: No     Frequency: Patient refused    Drug use: No    Sexual activity: Not Currently   Social History Narrative    ** Merged History Encounter **              MEDICATIONS     Current Outpatient Medications   Medication Sig    aspirin 81 mg chewable tablet Take 1 Tab by mouth daily.  atorvastatin (LIPITOR) 40 mg tablet Take 40 mg by mouth nightly.  clopidogrel (PLAVIX) 75 mg tab Take 75 mg by mouth nightly.  gabapentin (NEURONTIN) 300 mg capsule TAKE 1 CAPSULE BY MOUTH THREE TIMES DAILY AS NEEDED FOR PAIN MAX  DAILY  AMOUNT  900  MG     No current facility-administered medications for this visit. I have reviewed the nurses notes, vitals, problem list, allergy list, medical history, family, social history and medications. REVIEW OF SYMPTOMS      General: Pt denies excessive weight gain or loss. Pt is able to conduct ADL's  HEENT: Denies blurred vision, headaches, hearing loss, epistaxis and difficulty swallowing. Respiratory: Denies cough, congestion, shortness of breath, SERRANO, wheezing or stridor. Cardiovascular: Denies precordial pain, palpitations, edema or PND  Gastrointestinal: Denies poor appetite, indigestion, abdominal pain or blood in stool  Genitourinary: Denies hematuria, dysuria, increased urinary frequency  Musculoskeletal: Denies joint pain or swelling from muscles or joints  Neurologic: Denies tremor, paresthesias, headache, or sensory motor disturbance  Psychiatric: Denies confusion, insomnia, depression  Integumentray: Denies rash, itching or ulcers.   Hematologic: Denies easy bruising, bleeding       PHYSICAL EXAMINATION      There were no vitals filed for this visit. General: Well developed, in no acute distress. HEENT: No jaundice, oral mucosa moist, no oral ulcers  Neck: Supple, no stiffness, no lymphadenopathy, supple  Heart:  Normal S1/S2 negative S3 or S4. Regular, no murmur, gallop or rub, no jugular venous distention  Respiratory: Clear bilaterally x 4, no wheezing or rales  Abdomen:   Soft, non-tender, bowel sounds are active.   Extremities:  No edema, normal cap refill, no cyanosis. Musculoskeletal: No clubbing, no deformities  Neuro: A&Ox3, speech clear, gait stable, cooperative, no focal neurologic deficits  Skin: Skin color is normal. No rashes or lesions. Non diaphoretic, moist.  Vascular: 2+ pulses symmetric in all extremities       DIAGNOSTIC DATA      EKG:        LABORATORY DATA      Lab Results   Component Value Date/Time    WBC 10.7 10/16/2019 10:17 AM    HGB 13.8 10/16/2019 10:17 AM    HCT 41.7 10/16/2019 10:17 AM    PLATELET 330 31/17/2065 10:17 AM    MCV 88.0 10/16/2019 10:17 AM      Lab Results   Component Value Date/Time    Sodium 143 10/16/2019 10:17 AM    Potassium 3.9 10/16/2019 10:17 AM    Chloride 110 (H) 10/16/2019 10:17 AM    CO2 27 10/16/2019 10:17 AM    Anion gap 6 10/16/2019 10:17 AM    Glucose 124 (H) 10/16/2019 10:17 AM    BUN 10 10/16/2019 10:17 AM    Creatinine 0.86 10/16/2019 10:17 AM    BUN/Creatinine ratio 12 10/16/2019 10:17 AM    GFR est AA >60 10/16/2019 10:17 AM    GFR est non-AA >60 10/16/2019 10:17 AM    Calcium 8.8 10/16/2019 10:17 AM    Bilirubin, total 0.5 10/15/2019 06:55 PM    AST (SGOT) 22 10/15/2019 06:55 PM    Alk. phosphatase 83 10/15/2019 06:55 PM    Protein, total 7.0 10/15/2019 06:55 PM    Albumin 4.0 10/15/2019 06:55 PM    Globulin 3.0 10/15/2019 06:55 PM    A-G Ratio 1.3 10/15/2019 06:55 PM    ALT (SGPT) 24 10/15/2019 06:55 PM           ASSESSMENT      1. Cryptogenic stroke  2.  CAD  3. Dyslipidemia           PLAN     Continue monitoring ILR; patient will journal when she is outside gardening to better understand whether the tachycardias observed on her monitor represent an arrhythmia or escalation of HR in response to activity. FOLLOW-UP     3 months      Thank you, Aminata Thomas MD for allowing me to participate in the care of this extraordinarily pleasant female. Please do not hesitate to contact me for further questions/concerns.          Shanelle Tapia MD  Cardiac Electrophysiology / Cardiology    [address]  [address] [place] [address], [address]  [phone] / [phone] Fax   [phone] / [phone] Fax

## 2020-03-11 ENCOUNTER — OFFICE VISIT (OUTPATIENT)
Dept: CARDIOLOGY CLINIC | Age: 70
End: 2020-03-11

## 2020-03-11 VITALS
OXYGEN SATURATION: 98 % | BODY MASS INDEX: 25.76 KG/M2 | RESPIRATION RATE: 16 BRPM | SYSTOLIC BLOOD PRESSURE: 144 MMHG | WEIGHT: 154.6 LBS | DIASTOLIC BLOOD PRESSURE: 82 MMHG | HEART RATE: 88 BPM | HEIGHT: 65 IN

## 2020-03-11 DIAGNOSIS — Z95.818 STATUS POST PLACEMENT OF IMPLANTABLE LOOP RECORDER: ICD-10-CM

## 2020-03-11 DIAGNOSIS — R00.0 TACHYCARDIA: Primary | ICD-10-CM

## 2020-03-11 DIAGNOSIS — E78.00 HIGH CHOLESTEROL: ICD-10-CM

## 2020-03-11 DIAGNOSIS — G45.9 TIA (TRANSIENT ISCHEMIC ATTACK): ICD-10-CM

## 2020-03-11 DIAGNOSIS — R56.9 SEIZURE (HCC): ICD-10-CM

## 2020-03-11 NOTE — PROGRESS NOTES
ROOM # 3  No ER visits or hospital admissions recently  Denies any cardiac complaints at this time.    Visit Vitals  /82 (BP 1 Location: Left arm, BP Patient Position: Sitting)   Pulse 88   Resp 16   Ht 5' 5\" (1.651 m)   Wt 154 lb 9.6 oz (70.1 kg)   SpO2 98%   BMI 25.73 kg/m²

## 2020-04-22 RX ORDER — CLOPIDOGREL BISULFATE 75 MG/1
75 TABLET ORAL
Qty: 90 TAB | Refills: 3 | Status: SHIPPED | OUTPATIENT
Start: 2020-04-22 | End: 2021-01-12 | Stop reason: SDUPTHER

## 2020-05-01 ENCOUNTER — TELEPHONE (OUTPATIENT)
Dept: FAMILY MEDICINE CLINIC | Age: 70
End: 2020-05-01

## 2020-05-01 RX ORDER — VALACYCLOVIR HYDROCHLORIDE 500 MG/1
500 TABLET, FILM COATED ORAL 2 TIMES DAILY
Qty: 30 TAB | Refills: 5 | Status: SHIPPED | OUTPATIENT
Start: 2020-05-01 | End: 2020-05-08

## 2020-06-01 ENCOUNTER — HOSPITAL ENCOUNTER (OUTPATIENT)
Age: 70
Setting detail: OBSERVATION
Discharge: HOME | End: 2020-06-02
Attending: STUDENT IN AN ORGANIZED HEALTH CARE EDUCATION/TRAINING PROGRAM | Admitting: FAMILY MEDICINE
Payer: MEDICARE

## 2020-06-01 ENCOUNTER — APPOINTMENT (OUTPATIENT)
Dept: CT IMAGING | Age: 70
End: 2020-06-01
Attending: STUDENT IN AN ORGANIZED HEALTH CARE EDUCATION/TRAINING PROGRAM
Payer: MEDICARE

## 2020-06-01 ENCOUNTER — HOSPITAL ENCOUNTER (OUTPATIENT)
Dept: MRI IMAGING | Age: 70
Setting detail: OBSERVATION
Discharge: HOME | End: 2020-06-01
Attending: STUDENT IN AN ORGANIZED HEALTH CARE EDUCATION/TRAINING PROGRAM
Payer: MEDICARE

## 2020-06-01 ENCOUNTER — APPOINTMENT (OUTPATIENT)
Dept: GENERAL RADIOLOGY | Age: 70
End: 2020-06-01
Attending: STUDENT IN AN ORGANIZED HEALTH CARE EDUCATION/TRAINING PROGRAM
Payer: MEDICARE

## 2020-06-01 DIAGNOSIS — R27.0 ATAXIA: Primary | ICD-10-CM

## 2020-06-01 DIAGNOSIS — R42 DIZZINESS: ICD-10-CM

## 2020-06-01 PROBLEM — I63.9 CVA (CEREBRAL VASCULAR ACCIDENT) (HCC): Status: ACTIVE | Noted: 2020-06-01

## 2020-06-01 LAB
ALBUMIN SERPL-MCNC: 4.2 G/DL (ref 3.5–5)
ALBUMIN/GLOB SERPL: 1.3 {RATIO} (ref 1.1–2.2)
ALP SERPL-CCNC: 90 U/L (ref 45–117)
ALT SERPL-CCNC: 30 U/L (ref 12–78)
AMPHET UR QL SCN: NEGATIVE
ANION GAP SERPL CALC-SCNC: 5 MMOL/L (ref 5–15)
APPEARANCE UR: ABNORMAL
AST SERPL-CCNC: 16 U/L (ref 15–37)
BACTERIA URNS QL MICRO: ABNORMAL /HPF
BARBITURATES UR QL SCN: NEGATIVE
BASOPHILS # BLD: 0.1 K/UL (ref 0–0.1)
BASOPHILS NFR BLD: 0 % (ref 0–1)
BENZODIAZ UR QL: NEGATIVE
BILIRUB SERPL-MCNC: 0.6 MG/DL (ref 0.2–1)
BILIRUB UR QL: NEGATIVE
BUN SERPL-MCNC: 20 MG/DL (ref 6–20)
BUN/CREAT SERPL: 19 (ref 12–20)
CALCIUM SERPL-MCNC: 8.9 MG/DL (ref 8.5–10.1)
CANNABINOIDS UR QL SCN: NEGATIVE
CHLORIDE SERPL-SCNC: 109 MMOL/L (ref 97–108)
CO2 SERPL-SCNC: 26 MMOL/L (ref 21–32)
COCAINE UR QL SCN: NEGATIVE
COLOR UR: ABNORMAL
COMMENT, HOLDF: NORMAL
COMMENT, HOLDF: NORMAL
CREAT SERPL-MCNC: 1.04 MG/DL (ref 0.55–1.02)
CRP SERPL-MCNC: 0.89 MG/DL (ref 0–0.6)
D DIMER PPP FEU-MCNC: 0.35 MG/L FEU (ref 0–0.65)
DIFFERENTIAL METHOD BLD: ABNORMAL
DRUG SCRN COMMENT,DRGCM: NORMAL
EOSINOPHIL # BLD: 0.1 K/UL (ref 0–0.4)
EOSINOPHIL NFR BLD: 0 % (ref 0–7)
EPITH CASTS URNS QL MICRO: ABNORMAL /LPF
ERYTHROCYTE [DISTWIDTH] IN BLOOD BY AUTOMATED COUNT: 12.9 % (ref 11.5–14.5)
ETHANOL SERPL-MCNC: <10 MG/DL
FOLATE SERPL-MCNC: 17.4 NG/ML (ref 5–21)
GLOBULIN SER CALC-MCNC: 3.2 G/DL (ref 2–4)
GLUCOSE BLD STRIP.AUTO-MCNC: 105 MG/DL (ref 65–100)
GLUCOSE SERPL-MCNC: 117 MG/DL (ref 65–100)
GLUCOSE UR STRIP.AUTO-MCNC: NEGATIVE MG/DL
HCT VFR BLD AUTO: 46.3 % (ref 35–47)
HGB BLD-MCNC: 15.5 G/DL (ref 11.5–16)
HGB UR QL STRIP: NEGATIVE
IMM GRANULOCYTES # BLD AUTO: 0.1 K/UL (ref 0–0.04)
IMM GRANULOCYTES NFR BLD AUTO: 0 % (ref 0–0.5)
INR PPP: 1 (ref 0.9–1.1)
KETONES UR QL STRIP.AUTO: 40 MG/DL
LDH SERPL L TO P-CCNC: 148 U/L (ref 81–246)
LEUKOCYTE ESTERASE UR QL STRIP.AUTO: NEGATIVE
LYMPHOCYTES # BLD: 3.6 K/UL (ref 0.8–3.5)
LYMPHOCYTES NFR BLD: 18 % (ref 12–49)
MCH RBC QN AUTO: 29 PG (ref 26–34)
MCHC RBC AUTO-ENTMCNC: 33.5 G/DL (ref 30–36.5)
MCV RBC AUTO: 86.7 FL (ref 80–99)
METHADONE UR QL: NEGATIVE
MONOCYTES # BLD: 1 K/UL (ref 0–1)
MONOCYTES NFR BLD: 5 % (ref 5–13)
MUCOUS THREADS URNS QL MICRO: ABNORMAL /LPF
NEUTS SEG # BLD: 15.9 K/UL (ref 1.8–8)
NEUTS SEG NFR BLD: 77 % (ref 32–75)
NITRITE UR QL STRIP.AUTO: POSITIVE
NRBC # BLD: 0 K/UL (ref 0–0.01)
NRBC BLD-RTO: 0 PER 100 WBC
OPIATES UR QL: NEGATIVE
PCP UR QL: NEGATIVE
PH UR STRIP: 5 [PH] (ref 5–8)
PLATELET # BLD AUTO: 301 K/UL (ref 150–400)
PMV BLD AUTO: 9.8 FL (ref 8.9–12.9)
POTASSIUM SERPL-SCNC: 4.1 MMOL/L (ref 3.5–5.1)
PROT SERPL-MCNC: 7.4 G/DL (ref 6.4–8.2)
PROT UR STRIP-MCNC: NEGATIVE MG/DL
PROTHROMBIN TIME: 10.4 SEC (ref 9–11.1)
RBC # BLD AUTO: 5.34 M/UL (ref 3.8–5.2)
RBC #/AREA URNS HPF: ABNORMAL /HPF (ref 0–5)
SAMPLES BEING HELD,HOLD: NORMAL
SAMPLES BEING HELD,HOLD: NORMAL
SERVICE CMNT-IMP: ABNORMAL
SODIUM SERPL-SCNC: 140 MMOL/L (ref 136–145)
SP GR UR REFRACTOMETRY: 1.03 (ref 1–1.03)
TROPONIN I SERPL-MCNC: <0.05 NG/ML
TROPONIN I SERPL-MCNC: <0.05 NG/ML
TSH SERPL DL<=0.05 MIU/L-ACNC: 0.61 UIU/ML (ref 0.36–3.74)
UROBILINOGEN UR QL STRIP.AUTO: 0.2 EU/DL (ref 0.2–1)
VIT B12 SERPL-MCNC: 438 PG/ML (ref 193–986)
WBC # BLD AUTO: 20.7 K/UL (ref 3.6–11)
WBC URNS QL MICRO: ABNORMAL /HPF (ref 0–4)

## 2020-06-01 PROCEDURE — 70544 MR ANGIOGRAPHY HEAD W/O DYE: CPT

## 2020-06-01 PROCEDURE — 70450 CT HEAD/BRAIN W/O DYE: CPT

## 2020-06-01 PROCEDURE — 70548 MR ANGIOGRAPHY NECK W/DYE: CPT

## 2020-06-01 PROCEDURE — 74011250636 HC RX REV CODE- 250/636: Performed by: RADIOLOGY

## 2020-06-01 PROCEDURE — 85025 COMPLETE CBC W/AUTO DIFF WBC: CPT

## 2020-06-01 PROCEDURE — 74011250637 HC RX REV CODE- 250/637: Performed by: STUDENT IN AN ORGANIZED HEALTH CARE EDUCATION/TRAINING PROGRAM

## 2020-06-01 PROCEDURE — 87635 SARS-COV-2 COVID-19 AMP PRB: CPT

## 2020-06-01 PROCEDURE — 83520 IMMUNOASSAY QUANT NOS NONAB: CPT

## 2020-06-01 PROCEDURE — 99218 HC RM OBSERVATION: CPT

## 2020-06-01 PROCEDURE — 99285 EMERGENCY DEPT VISIT HI MDM: CPT

## 2020-06-01 PROCEDURE — 86140 C-REACTIVE PROTEIN: CPT

## 2020-06-01 PROCEDURE — 82728 ASSAY OF FERRITIN: CPT

## 2020-06-01 PROCEDURE — 80307 DRUG TEST PRSMV CHEM ANLYZR: CPT

## 2020-06-01 PROCEDURE — 96374 THER/PROPH/DIAG INJ IV PUSH: CPT

## 2020-06-01 PROCEDURE — 87086 URINE CULTURE/COLONY COUNT: CPT

## 2020-06-01 PROCEDURE — 71046 X-RAY EXAM CHEST 2 VIEWS: CPT

## 2020-06-01 PROCEDURE — A9575 INJ GADOTERATE MEGLUMI 0.1ML: HCPCS | Performed by: RADIOLOGY

## 2020-06-01 PROCEDURE — 83615 LACTATE (LD) (LDH) ENZYME: CPT

## 2020-06-01 PROCEDURE — 82746 ASSAY OF FOLIC ACID SERUM: CPT

## 2020-06-01 PROCEDURE — 70551 MRI BRAIN STEM W/O DYE: CPT

## 2020-06-01 PROCEDURE — 74011250636 HC RX REV CODE- 250/636: Performed by: STUDENT IN AN ORGANIZED HEALTH CARE EDUCATION/TRAINING PROGRAM

## 2020-06-01 PROCEDURE — 87186 SC STD MICRODIL/AGAR DIL: CPT

## 2020-06-01 PROCEDURE — 82962 GLUCOSE BLOOD TEST: CPT

## 2020-06-01 PROCEDURE — 0100U RESPIRATORY PANEL,PCR,NASOPHARYNGEAL: CPT

## 2020-06-01 PROCEDURE — 36415 COLL VENOUS BLD VENIPUNCTURE: CPT

## 2020-06-01 PROCEDURE — 84443 ASSAY THYROID STIM HORMONE: CPT

## 2020-06-01 PROCEDURE — 82607 VITAMIN B-12: CPT

## 2020-06-01 PROCEDURE — 85379 FIBRIN DEGRADATION QUANT: CPT

## 2020-06-01 PROCEDURE — 84484 ASSAY OF TROPONIN QUANT: CPT

## 2020-06-01 PROCEDURE — 96372 THER/PROPH/DIAG INJ SC/IM: CPT

## 2020-06-01 PROCEDURE — 81001 URINALYSIS AUTO W/SCOPE: CPT

## 2020-06-01 PROCEDURE — 87077 CULTURE AEROBIC IDENTIFY: CPT

## 2020-06-01 PROCEDURE — 85610 PROTHROMBIN TIME: CPT

## 2020-06-01 PROCEDURE — 83036 HEMOGLOBIN GLYCOSYLATED A1C: CPT

## 2020-06-01 PROCEDURE — 80053 COMPREHEN METABOLIC PANEL: CPT

## 2020-06-01 PROCEDURE — 74011000258 HC RX REV CODE- 258: Performed by: STUDENT IN AN ORGANIZED HEALTH CARE EDUCATION/TRAINING PROGRAM

## 2020-06-01 PROCEDURE — 93005 ELECTROCARDIOGRAM TRACING: CPT

## 2020-06-01 RX ORDER — ENOXAPARIN SODIUM 100 MG/ML
40 INJECTION SUBCUTANEOUS EVERY 24 HOURS
Status: DISCONTINUED | OUTPATIENT
Start: 2020-06-01 | End: 2020-06-01

## 2020-06-01 RX ORDER — GADOTERATE MEGLUMINE 376.9 MG/ML
14 INJECTION INTRAVENOUS
Status: COMPLETED | OUTPATIENT
Start: 2020-06-01 | End: 2020-06-01

## 2020-06-01 RX ORDER — ONDANSETRON 2 MG/ML
4 INJECTION INTRAMUSCULAR; INTRAVENOUS
Status: DISCONTINUED | OUTPATIENT
Start: 2020-06-01 | End: 2020-06-01

## 2020-06-01 RX ORDER — SODIUM CHLORIDE 0.9 % (FLUSH) 0.9 %
5-40 SYRINGE (ML) INJECTION AS NEEDED
Status: DISCONTINUED | OUTPATIENT
Start: 2020-06-01 | End: 2020-06-02 | Stop reason: HOSPADM

## 2020-06-01 RX ORDER — SODIUM CHLORIDE 9 MG/ML
100 INJECTION, SOLUTION INTRAVENOUS CONTINUOUS
Status: DISPENSED | OUTPATIENT
Start: 2020-06-01 | End: 2020-06-02

## 2020-06-01 RX ORDER — PROCHLORPERAZINE EDISYLATE 5 MG/ML
5 INJECTION INTRAMUSCULAR; INTRAVENOUS
Status: DISCONTINUED | OUTPATIENT
Start: 2020-06-01 | End: 2020-06-02 | Stop reason: HOSPADM

## 2020-06-01 RX ORDER — ACETAMINOPHEN 650 MG/1
650 SUPPOSITORY RECTAL
Status: DISCONTINUED | OUTPATIENT
Start: 2020-06-01 | End: 2020-06-02 | Stop reason: HOSPADM

## 2020-06-01 RX ORDER — CLOPIDOGREL BISULFATE 75 MG/1
75 TABLET ORAL
Status: DISCONTINUED | OUTPATIENT
Start: 2020-06-01 | End: 2020-06-02 | Stop reason: HOSPADM

## 2020-06-01 RX ORDER — ACETAMINOPHEN 325 MG/1
650 TABLET ORAL
Status: DISCONTINUED | OUTPATIENT
Start: 2020-06-01 | End: 2020-06-02 | Stop reason: HOSPADM

## 2020-06-01 RX ORDER — ATORVASTATIN CALCIUM 20 MG/1
40 TABLET, FILM COATED ORAL
Status: DISCONTINUED | OUTPATIENT
Start: 2020-06-01 | End: 2020-06-02 | Stop reason: HOSPADM

## 2020-06-01 RX ORDER — SODIUM CHLORIDE 0.9 % (FLUSH) 0.9 %
5-40 SYRINGE (ML) INJECTION EVERY 8 HOURS
Status: DISCONTINUED | OUTPATIENT
Start: 2020-06-01 | End: 2020-06-02 | Stop reason: HOSPADM

## 2020-06-01 RX ORDER — HEPARIN SODIUM 5000 [USP'U]/ML
5000 INJECTION, SOLUTION INTRAVENOUS; SUBCUTANEOUS EVERY 8 HOURS
Status: DISCONTINUED | OUTPATIENT
Start: 2020-06-01 | End: 2020-06-02 | Stop reason: HOSPADM

## 2020-06-01 RX ORDER — GUAIFENESIN 100 MG/5ML
81 LIQUID (ML) ORAL DAILY
Status: DISCONTINUED | OUTPATIENT
Start: 2020-06-02 | End: 2020-06-02 | Stop reason: HOSPADM

## 2020-06-01 RX ADMIN — CLOPIDOGREL BISULFATE 75 MG: 75 TABLET ORAL at 21:57

## 2020-06-01 RX ADMIN — SODIUM CHLORIDE 100 ML/HR: 900 INJECTION, SOLUTION INTRAVENOUS at 17:57

## 2020-06-01 RX ADMIN — ACETAMINOPHEN 650 MG: 325 TABLET ORAL at 17:56

## 2020-06-01 RX ADMIN — ATORVASTATIN CALCIUM 40 MG: 20 TABLET, FILM COATED ORAL at 21:57

## 2020-06-01 RX ADMIN — GADOTERATE MEGLUMINE 14 ML: 376.9 INJECTION INTRAVENOUS at 20:58

## 2020-06-01 RX ADMIN — HEPARIN SODIUM 5000 UNITS: 5000 INJECTION INTRAVENOUS; SUBCUTANEOUS at 21:56

## 2020-06-01 RX ADMIN — CEFTRIAXONE SODIUM 1 G: 1 INJECTION, POWDER, FOR SOLUTION INTRAMUSCULAR; INTRAVENOUS at 21:53

## 2020-06-01 RX ADMIN — Medication 10 ML: at 21:58

## 2020-06-01 NOTE — ED PROVIDER NOTES
Patient is a 79-year-old female presenting to the emergency department for dizziness, nausea, vomiting, headache. Patient went to bed last night at approximate 7 PM without any symptoms this morning woke up with a diffuse headache dizziness nausea and vomiting. Patient has a past medical history significant for CLL, TIAs. Patient recently had a loop recorder inserted by Dr. Emanuel Patel. Patient denies any upper or lower extremity weakness, numbness or tingling. Patient denies any recent illnesses she feels that she has not slept in the last 4 months because of the fact that her  has dementia when she states \"I am up every hour on the hour\".            Past Medical History:   Diagnosis Date    [facility]     CLL (chronic lymphocytic leukemia) ([facility]     Coronary artery disease     Dyslipidemia     TIA (transient ischemic attack) 9/2015       Past Surgical History:   Procedure Laterality Date    BREAST SURGERY PROCEDURE UNLISTED      HX GYN  2004    [address]    HX OTHER SURGICAL  1998    ganglion cyst removed- wrist R    HX OTHER SURGICAL  1998    breast surgery    HX OTHER SURGICAL  1990    fibrodema (left breast)     HX OTHER SURGICAL  1999    ganglin cyst of the right hand     [place] INSERTION SUBQ CARDIAC RHYTHM MONITOR W/[facility] N/A 12/2/2019    LOOP RECORDER INSERT performed by Lucrecia Feliciano [facility]         Family History:   Problem Relation Age of Onset    Cancer Mother         renal, bladder       Social History     Socioeconomic History    Marital status:      Spouse name: Not on file    Number of children: Not on file    Years of education: Not on file    Highest education level: Not on file   Occupational History    Not on file   Social Needs    Financial resource strain: Not on file    Food insecurity     Worry: Not on file     Inability: Not on file    Transportation needs     Medical: Not on file     Non-medical: Not on file   Tobacco Use    Smoking status: Never Smoker    Smokeless tobacco: Never Used   Substance and Sexual Activity    Alcohol use: No     Frequency: Patient refused    Drug use: No    Sexual activity: Not Currently   Lifestyle    Physical activity     Days per week: Not on file     Minutes per session: Not on file    Stress: Not on file   Relationships    Social connections     Talks on phone: Not on file     Gets together: Not on file     Attends [employer] service: Not on file     Active member of club or organization: Not on file     Attends meetings of clubs or organizations: Not on file     Relationship status: Not on file    Intimate partner violence     Fear of current or ex partner: Not on file     Emotionally abused: Not on file     Physically abused: Not on file     Forced sexual activity: Not on file   Other Topics Concern    Not on file   Social History Narrative    ** Merged History Encounter **              ALLERGIES: Ampicillin; Bacitracin; and Bactrim [sulfamethoprim]    Review of Systems   Constitutional: Negative for fever. Respiratory: Negative for cough. Cardiovascular: Negative for chest pain. Gastrointestinal: Positive for nausea and vomiting. Negative for abdominal pain. Neurological: Positive for dizziness and headaches. Negative for tremors, seizures, syncope, speech difficulty, weakness and numbness. All other systems reviewed and are negative. Vitals:    06/01/20 1256 06/01/20 1311   BP: 121/79    Pulse: (!) 106    Resp: 16    Temp: 98.2 °F (36.8 °C)    SpO2: 97% 97%   Weight: 72.1 kg (159 lb)    Height: 5' 5\" (1.651 m)             Physical Exam  Eyes:      General: No visual field deficit. Neurological:      Mental Status: She is oriented to person, place, and time. GCS: GCS eye subscore is 4. GCS verbal subscore is 5. GCS motor subscore is 6. Cranial Nerves: No cranial nerve deficit, dysarthria or facial asymmetry. Sensory: No sensory deficit. Motor: No weakness. Coordination: Coordination abnormal. Finger-Nose-Finger Test abnormal. Heel to Santos Test normal.          MDM  Number of Diagnoses or Management Options  Ataxia:   Dizziness:   Diagnosis management comments: A/P: Cerebellar infarct, BPPV, TIA. 24-year-old female presenting with dizziness, nausea, vomiting bilateral upper extremity ataxia concerning for cerebellar infarct. Code neuro level 2 called CT head without contrast only. No indication for CTA or perfusion as patient has no evidence of LVO. Amount and/or Complexity of Data Reviewed  Clinical lab tests: ordered and reviewed  Tests in the radiology section of CPT®: ordered and reviewed  Review and summarize past medical records: yes  Independent visualization of images, tracings, or specimens: yes    Risk of Complications, Morbidity, and/or Mortality  Presenting problems: moderate  Diagnostic procedures: moderate  Management options: moderate    Critical Care  Total time providing critical care: 30-74 minutes (Total critical care time spent exclusive of procedures:  38 min)    Patient Progress  Patient progress: stable         Procedures    Spoke to tele-neurology who agrees with plan of care CT head without contrast no indication for CTA. Patient will require MRI imaging and admission. Hospitalist Mona Serve for Admission  3:09 PM    ED Room Number: ER18/18  Patient Name and age: Jesus Bridges 78 y.o.  female  Working Diagnosis:   1. Ataxia    2. Dizziness        COVID-19 Suspicion:  no    Code Status:  Full Code  Readmission: no  Isolation Requirements:  no  Recommended Level of Care:  telemetry  Department:[facility] ED - [phone]  Other: 24-year-old female admission for upper extremity ataxia, dizziness.

## 2020-06-01 NOTE — H&P
[facility]   Admission H&P    Date of admission: 6/1/2020    Patient name: Seldon Gottron  MRN: [id number]  YOB: 1950  Age: 78 y.o. Primary care provider:  Nilton Goodman MD     Source of Information: patient, medical records    Chief complaint:  Dizziness. Headache    History of Present Illness  Tasneem Flower is a 78 y.o. female with known history of cryptogenic strokes (4 noted episodes) and dyslipidemia who presents to the ER complaining of headache, dizziness and nausea. Patient states she had been doing fine up till about 8 AM this morning. Patient states she had a transient episode of a mild, right-sided headache with accompanying dizziness, loss of balance and inability to note her positional changes. Patient states \"I cannot tell where my feet were. It was as if there were holes on the ground. \"  She also endorses associated nausea with 4 episodes of nonbloody nonbilious emesis. She describes her dizziness as feeling as though the room around her was spinning and states this has been a chronic issue for which she has been followed by neurology and audiology outpatient. She admits to recurrent episodes with rapid changes in position or with side-to-side head movement. She denies any abdominal pain, fevers, chills or night sweats. She denies any urinary symptoms to include dysuria, urgency, frequency, or suprapubic tenderness or flank pain. Denies any recent sick contacts, travel or visits to healthcare facilities. In ER vital signs were remarkable for heart rate of 106. Labs were notable for leukocytosis to 20.7 creatinine of 1.04 with baseline of 0.8-0.9. Urinalysis showed cloudy urine with positive nitrites, ketones and 1+ bacteria with moderate epithelial cells. Home Medications   Prior to Admission medications    Medication Sig Start Date End Date Taking?  Authorizing Provider   clopidogreL (Plavix) 75 mg tab Take 1 Tab by mouth nightly. 4/22/20  Yes Adarsh Woods,    aspirin 81 mg chewable tablet Take 1 Tab by mouth daily. 10/16/19  Yes Stacey Barnes MD   atorvastatin (LIPITOR) 40 mg tablet Take 40 mg by mouth nightly.    Yes Provider, Historical   gabapentin (NEURONTIN) 300 mg capsule TAKE 1 CAPSULE BY MOUTH THREE TIMES DAILY AS NEEDED FOR PAIN MAX  DAILY  AMOUNT  900  MG 9/11/19   Freddy Ramos NP     Allergies   Allergies   Allergen Reactions    Ampicillin Hives    Bacitracin Other (comments)    Bactrim [Sulfamethoprim] Hives       Past Medical History:   Diagnosis Date    [facility]     CLL [facility]ronic [facility]ocytic leukemia) [facility]     Coronary artery disease     Dyslipidemia     Neurological disorder     TIA (transient ischemic attack) 9/2015    TIA (transient ischemic attack)      Past Surgical History:   Procedure Laterality Date    BREAST SURGERY PROCEDURE UNLISTED      HX GYN  2004    [address]    HX OTHER SURGICAL  1998    ganglion cyst removed- wrist R    HX OTHER SURGICAL  1998    breast surgery    HX OTHER SURGICAL  1990    fibrodema (left breast)     HX OTHER SURGICAL  1999    ganglin cyst of the right hand     AR INSERTION SUBQ CARDIAC RHYTHM MONITOR W/PRGRMG N/A 12/2/2019    LOOP RECORDER INSERT performed by Sergey Sandhu MD at [facility]       Family History   Problem Relation Age of Onset    Cancer Mother         renal, bladder       Social History   Patient resides  x  Independently      With family care      Assisted living      SNF      Ambulates  x  Independently      With cane       Assisted walker           Alcohol history   x  None     Social     Chronic     Smoking history  x  None     Former smoker     Current smoker     Social History     Tobacco Use   Smoking Status Never Smoker   Smokeless Tobacco Never Used       Drug history  x  None     Former drug user     Current drug user       Code status  x  Full code     DNR/DNI     Partial    Code status discussed with the patient/caregivers. Review of Systems  See HPI    Physical Exam  Visit Vitals  /83   Pulse 96   Temp 98.2 °F (36.8 °C)   Resp 23   Ht 5' 5\" (1.651 m)   Wt 159 lb (72.1 kg)   SpO2 97%   BMI 26.46 kg/m²        General: No acute distress. Alert. Cooperative. Head: Normocephalic. Atraumatic. Eyes:  Conjunctiva pink. Sclera white. PERRL. Ears:  Ear canals patent. TM non-erythematous. Nose:  Septum midline. Mucosa pink. No drainage. Throat: Mucosa pink. Moist mucous membranes. No tonsillar exudates or erythema. Palate movement equal bilaterally. Neck: Supple. Normal ROM. No stiffness. Respiratory: CTAB. No w/r/r/c.   Cardiovascular: RRR. Normal S1,S2. No m/r/g. Pulses 2+ throughout. GI: + bowel sounds. Nontender. No rebound tenderness or guarding. Nondistended. Extremities: No edema. No palpable cord. No tenderness. Musculoskeletal: Full ROM in all extremities. Neuro: CN II-XII grossly intact. Strength 5/5 in all extremities. Sensation intact in all extremities. DTRs 2+ throughout. No dysmetria. Normal finger-to-nose testing. No dysdiadochokinesia. Heel-to-toe testing normal.  Gait and Romberg not tested. Skin: Clear. No rashes. No ulcers. : Deferred   Rectal: Deferred       Laboratory Data  Recent Results (from the past 24 hour(s))   SAMPLES BEING HELD    Collection Time: 06/01/20  1:11 PM   Result Value Ref Range    SAMPLES BEING HELD 1LAV,1SST,1PST,1RD,1BL     COMMENT        Add-on orders for these samples will be processed based on acceptable specimen integrity and analyte stability, which may vary by analyte.    ETHYL ALCOHOL    Collection Time: 06/01/20  1:11 PM   Result Value Ref Range    ALCOHOL(ETHYL),SERUM <10 <10 MG/DL   CBC WITH AUTOMATED DIFF    Collection Time: 06/01/20  1:11 PM   Result Value Ref Range    WBC 20.7 (H) 3.6 - 11.0 K/uL    RBC 5.34 (H) 3.80 - 5.20 M/uL    HGB 15.5 11.5 - 16.0 g/dL    HCT 46.3 35.0 - 47.0 %    MCV 86.7 80.0 - 99.0 FL    MCH 29.0 26.0 - 34.0 PG    MCHC 33.5 30.0 - 36.5 g/dL    RDW 12.9 11.5 - 14.5 %    PLATELET 589 765 - 156 K/uL    MPV 9.8 8.9 - 12.9 FL    NRBC 0.0 0  WBC    ABSOLUTE NRBC 0.00 0.00 - 0.01 K/uL    NEUTROPHILS 77 (H) 32 - 75 %    LYMPHOCYTES 18 12 - 49 %    MONOCYTES 5 5 - 13 %    EOSINOPHILS 0 0 - 7 %    BASOPHILS 0 0 - 1 %    IMMATURE GRANULOCYTES 0 0.0 - 0.5 %    ABS. NEUTROPHILS 15.9 (H) 1.8 - 8.0 K/UL    ABS. LYMPHOCYTES 3.6 (H) 0.8 - 3.5 K/UL    ABS. MONOCYTES 1.0 0.0 - 1.0 K/UL    ABS. EOSINOPHILS 0.1 0.0 - 0.4 K/UL    ABS. BASOPHILS 0.1 0.0 - 0.1 K/UL    ABS. IMM. GRANS. 0.1 (H) 0.00 - 0.04 K/UL    DF AUTOMATED     METABOLIC PANEL, COMPREHENSIVE    Collection Time: 06/01/20  1:11 PM   Result Value Ref Range    Sodium 140 136 - 145 mmol/L    Potassium 4.1 3.5 - 5.1 mmol/L    Chloride 109 (H) 97 - 108 mmol/L    CO2 26 21 - 32 mmol/L    Anion gap 5 5 - 15 mmol/L    Glucose 117 (H) 65 - 100 mg/dL    BUN 20 6 - 20 MG/DL    Creatinine 1.04 (H) 0.55 - 1.02 MG/DL    BUN/Creatinine ratio 19 12 - 20      GFR est AA >60 >60 ml/min/1.73m2    GFR est non-AA 52 (L) >60 ml/min/1.73m2    Calcium 8.9 8.5 - 10.1 MG/DL    Bilirubin, total 0.6 0.2 - 1.0 MG/DL    ALT (SGPT) 30 12 - 78 U/L    AST (SGOT) 16 15 - 37 U/L    Alk.  phosphatase 90 45 - 117 U/L    Protein, total 7.4 6.4 - 8.2 g/dL    Albumin 4.2 3.5 - 5.0 g/dL    Globulin 3.2 2.0 - 4.0 g/dL    A-G Ratio 1.3 1.1 - 2.2     PROTHROMBIN TIME + INR    Collection Time: 06/01/20  1:11 PM   Result Value Ref Range    INR 1.0 0.9 - 1.1      Prothrombin time 10.4 9.0 - 11.1 sec   GLUCOSE, POC    Collection Time: 06/01/20  1:31 PM   Result Value Ref Range    Glucose (POC) 105 (H) 65 - 100 mg/dL    Performed by Laci DUARTE/ [facility]    Collection Time: 06/01/20  3:04 PM   Result Value Ref Range    Color YELLOW/STRAW      Appearance CLOUDY (A) CLEAR      Specific gravity 1.030 1.003 - 1.030      pH (UA) 5.0 5.0 - 8.0      Protein Negative NEG mg/dL    Glucose Negative NEG mg/dL    Ketone 40 (A) NEG mg/dL    Bilirubin Negative NEG      Blood Negative NEG      Urobilinogen 0.2 0.2 - 1.0 EU/dL    Nitrites Positive (A) NEG      Leukocyte Esterase Negative NEG      WBC 0-4 0 - 4 /hpf    RBC 0-5 0 - 5 /hpf    Epithelial cells MODERATE (A) FEW /lpf    Bacteria 1+ (A) NEG /hpf    Mucus 2+ (A) NEG /lpf   DRUG SCREEN, URINE    Collection Time: 06/01/20  3:04 PM   Result Value Ref Range    AMPHETAMINES Negative NEG      BARBITURATES Negative NEG      BENZODIAZEPINES Negative NEG      COCAINE Negative NEG      METHADONE Negative NEG      OPIATES Negative NEG      PCP(PHENCYCLIDINE) Negative NEG      THC (TH-CANNABINOL) Negative NEG      Drug screen comment (NOTE)    SAMPLES BEING HELD    Collection Time: 06/01/20  3:04 PM   Result Value Ref Range    SAMPLES BEING HELD 1UC     COMMENT        Add-on orders for these samples will be processed based on acceptable specimen integrity and analyte stability, which may vary by analyte. Imaging  CT code Neuro - head: No acute intracranial process. EKG: Normal sinus rhythm with occasional PVCs and nonspecific T wave changes. Assessment and Plan     Dana Barker is a 78 y.o. female with known history of cryptogenic strokes (4 noted episodes) and dyslipidemia who is admitted for severe work-up and dizziness. 1. TIA  -Patient with history of recurrent TIAs. - Followed by neurology and cardiology outpatient and has active implanted loop recorder. -CT had on admission was negative.   EKG normal sinus rhythm with occasional PVCs  -Patient had full TIA work-up in November 2019 which was unremarkable.  -Had suspected seizure activity at last admission and underwent trial of Keppra which was discontinued prior to discharge.  -Patient currently on aspirin, Lipitor and Plavix  -Patient evaluated by tele-neurology upon ER presentation who recommended brain MRI  -We will admit patient to telemetry with cardiac monitoring.  -Follow-up serial troponins, UDS, TSH, mag, phosphorus, B12, folate chest x-ray, echo, MRI brain, MRA head and neck  -Continue home Lipitor aspirin and Plavix  -Permissive hypertension  -Neurology on consult appreciate recs. 2. Dizziness  -This appears to be a chronic issue for which she has had extensive negative work-up. -Repeat testing as above  -Can consider trial of meclizine after testing complete  -Neurology on consult. Appreciate recs. 3. Headache -improved  -CT head unremarkable. Continue TIA work-up as above  -PRN Tylenol    4. HLD:  Last lipid panel November 2019 : Tg 116, HDL 51, LDL 50.8  - Continue home lipitor 40mg daily     5. Nausea and Vomiting in setting of Bacturia  -Vomiting now resolved. Unclear etiology. May be secondary to #2 above. UA cloudy with nitrites ketones and 1+ bacteria which raises concern for active cystitis though patient asymptomatic. -MIVF. Advance diet as tolerated. -We will treat with ceftriaxone 1 g daily.  -Follow-up urine culture  -Compazine as needed for nausea      FEN/GI - MIVF  Activity - As tolerated  DVT prophylaxis - Heparin  GI prophylaxis -  Not indicated  Disposition - Plan to d/c to - pending. CODE STATUS:  Full code       Patient discussed with Dr. Martín Ochoa.        Magy Bishop MD  [facility] Problems  Date Reviewed: 3/11/2020          Codes Class Noted POA    CVA (cerebral vascular accident) [facility]) ICD-10-CM: I63.9  ICD-9-CM: 434.91  6/1/2020 Unknown

## 2020-06-01 NOTE — PROGRESS NOTES
1915: Bedside and Verbal shift change report given to Leatha Godinez RN (oncoming nurse) by Cherie ULRICH RN (offgoing nurse). Report included the following information SBAR, Kardex, Intake/Output, MAR, Recent Results and Cardiac Rhythm NSR     1920: Pt was about to go to MRI when pt recalls having a loop recorder implanted in chest. Pt failed to recall this while MRI checklist was being done earlier. Called daughter Craig Layne to get make and model of device to make sure it was compatible. Checked with MRI and they said it was MRI compatible. 1935: Pt off floor to MRI.    2100: Pt back on floor from MRI. Pt moved from rm 316 to 328 for Droplet Plus isolation. Verbal shift change report given to Leon Chiu RN (oncoming nurse) by Leatha Godinez RN (offgoing nurse). Report included the following information SBAR, Kardex, Intake/Output and Recent Results.

## 2020-06-01 NOTE — PROGRESS NOTES
6/1/2020  4:21 PMCM completed assessment in person w/ pt, both CM and pt wore mask at all times. 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 Home is 2 story, there is a stair climber, pt has 2nd floor bed/bath but GF bed/bath is available. At baseline pt is ambulatory, iADLs,drives. Reason for Admission:   TIA   Pt is a 77 yo  female who presents to [facility] ED c/o dizziness, N/V and headache            PMHx: CLL, TIAs,    RUR Score:     N/A pt is OBS                Plan for utilizing home health:     No history, pt would benefit from PT/OT evals to determine skilled needs at d/c     PCP: First and Last name:  Camilo Toledo MD   Name of Practice: [address] [place] [address]   Are you a current patient: Yes/No: Yes   Approximate date of last visit: 10/18/19   Can you participate in a virtual visit with your PCP:  NO                    Current Advanced Directive/Advance Care Plan: pt does not have ACP, daughter Jaja Summers (C) [phone] is surrogate                         Transition of Care Plan:                    [address] admission OBS for medical management, neurology consult  2. CM to follow through for treatment/response  3. ACP planning, CM notified chaplains to meet w/ pt  4. CM provided pt w/ State and JUNIOR OBS letters, signed, copies to pt and to be scanned into EMR  5. Pt would benefit from PT/OT evals to determine skilled needs  6. D/C when stable to home  7. Outpatient f/u PCP, neurology  8.  Family will transport at d/c      Care Management Interventions  PCP Verified by CM: Swapnil Johnson MD, )  Last Visit to PCP: 10/18/19  Palliative Care Criteria Met (RRAT>21 & CHF Dx)?: No  Mode of Transport at Discharge: Self(Family)  Physical Therapy Consult: No  Occupational Therapy Consult: No  Speech Therapy Consult: No  Current Support Network: Own Home, Family Lives [place], Other(pt lvies w/ friend in pvt residence, reports to be ambulatory, iADLs, drives)  Confirm Follow Up Transport: Self  Discharge Location  Discharge Placement: Home with outpatient services  [place]

## 2020-06-01 NOTE — ACP (ADVANCE CARE PLANNING)
6/1/2020  4:32 PM  Advance Care Planning     Advance Care Planning Activator (Inpatient)  Conversation Note      Date of ACP Conversation: 06/01/20     Conversation Conducted with:  Patient Dmitri Rodriguez with capacity    ACP Activator: Ishan Edwards    *When Decision Maker makes decisions on behalf of the incapacitated patient: Decision Maker is asked to consider and make decisions based on patient values, known preferences, or best interests. Health Care Decision Maker:    Current Designated Health Care Decision Maker:   (If there is a valid [place] named in the [address] [place] [address] Makers\" box in the ACP activity, but it is not visible above, be sure to open that field and then select the health care decision maker relationship (ie \"primary\") in the blank space to the right of the name.) Validate  this information as still accurate & up-to-date; edit [place] field as needed.)    Note: Assess and validate information in current ACP documents, as indicated. If no Decision Maker listed above or available through scanned documents, then:    If no Authorized Decision Maker has previously been identified, then patient chooses [place]:  \"Who would you like to name as your primary health care decision-maker? \"    Nilo Lezama  Relationship:  daughter*Phone number: [phone]  Shiva Landon this person be reached easily? \"   \"Who would you like to name as your back-up decision maker? \"   Name:   Relationship:  Phone number:   \"Can this person be reached easily? \"     Note: If the relationship of these Decision-Makers to the patient does NOT follow your state's Next of Kin hierarchy, recommend that patient complete ACP document that meets state-specific requirements to allow them to act on the patient's behalf when appropriate. Care Preferences    Ventilation:   \"If you were in your present state of health and suddenly became very ill and were unable to breathe on your own, what would your preference be about the use of a ventilator (breathing machine) if it were available to you? \"  NO    If patient would desire the use of a ventilator (breathing machine), answer NO    \"If your health worsens and it becomes clear that your chance of recovery is unlikely, what would your preference be about the use of a ventilator (breathing machine) if it were available to you? \"     Would the patient desire the use of a ventilator (breathing machine)? NO      Resuscitation  \"CPR works best to restart the heart when there is a sudden event, like a heart attack, in someone who is otherwise healthy. Unfortunately, CPR does not typically restart the heart for people who have serious health conditions or who are very sick. \"    \"In the event your heart stopped as a result of an underlying serious health condition, would you want attempts to be made to restart your heart (answer \"yes\" for attempt to resuscitate) or would you prefer a natural death (answer \"no\" for do not attempt to resuscitate)? \" NO      NOTE: If the patient has a valid advance directive AND now provides care preference(s) that are inconsistent with that prior directive, advise the patient to consider either: creating a new advance directive that complies with state-specific requirements; or, if that is not possible, orally revoking that prior directive in accordance with state-specific requirements, which must be documented in the EHR. [] Yes  [x] No   Educated Patient / Charly Guthrie regarding differences between Advance Directives and portable DNR orders.     Length of ACP Conversation in minutes:      Conversation Outcomes:  [x] ACP discussion completed  [] Existing advance directive reviewed with patient; no changes to patient's previously recorded wishes     [] New Advance Directive completed   [] Portable Do Not Resuscitate prepared for Provider review and signature  [] POLST/POST/MOLST/MOST prepared for Provider review and signature      Follow-up plan:    [x] Schedule follow-up conversation to continue planning  [] Referred individual to Provider for additional questions/concerns   [] Advised patient/agent/surrogate to review completed ACP document and update if needed with changes in condition, patient preferences or care setting     [] This note routed to one or more involved healthcare providers     CM contacted chaplains to complete AMD.  Janis Palmyra

## 2020-06-01 NOTE — ED NOTES
Pt has BUE limb ataxia. Dr. Natalie Escalera notified and at bedside. LKW upon waking. Woke with dizziness and headache. Pt states she went to bed last night, 5/31/2020 around 1930. Woke this am 0600.      Signs and symptoms: BUE limb ataxia, headache dizziness  VAN score: Negative  Last Known Well: 1930 5/31/2020  Blood Glucose (EMS acceptable): 106  Blood Pressure (EMS acceptable): 121/79  Anticoagulants: unknown

## 2020-06-01 NOTE — ED TRIAGE NOTES
Patient reports she started with vomiting this morning after eating a piece of sausage. Patient also reports a headache.

## 2020-06-01 NOTE — ED NOTES
TRANSFER - OUT REPORT:    Verbal report given to April (name) on [address]  being transferred to [facility](unit) for routine progression of care       Report consisted of patients Situation, Background, Assessment and   Recommendations(SBAR). Information from the following report(s) SBAR, Kardex, ED Summary, Procedure Summary and Intake/Output was reviewed with the receiving nurse. Lines:   Peripheral IV 06/01/20 Left Antecubital (Active)   Site Assessment Clean, dry, & intact 6/1/2020  1:09 PM   Phlebitis Assessment 0 6/1/2020  1:09 PM   Infiltration Assessment 0 6/1/2020  1:09 PM   Dressing Status Clean, dry, & intact 6/1/2020  1:09 PM   Dressing Type Transparent 6/1/2020  1:09 PM   Hub Color/Line Status Pink 6/1/2020  1:09 PM   Action Taken Blood drawn 6/1/2020  1:09 PM   Alcohol Cap Used No 6/1/2020  1:09 PM        Opportunity for questions and clarification was provided.       Patient transported with:   Monitor  Registered Nurse

## 2020-06-01 NOTE — ED NOTES
Dr. Fanny Villanueva, Teleneurology on at this time. States pts NIH is 0 with postural limb ataxia present. Suggest MRI.

## 2020-06-01 NOTE — PROGRESS NOTES
[address] [facility] [place]    Office [phone]  Fax [phone]          Assessment and [facility] is a 78 y.o. female with known history of cryptogenic strokes (4 noted episodes) and dyslipidemia who is admitted for TIA  work-up and dizziness. 24 Hour Events: NAEO. 1. TIA: history of recurrent TIAs. See's neuro and cards outpatient and has active implanted loop recorder. CT head POA neg. EKG NSR with occ PVC's. TIA work-up 11/2019- unremarkable. Suspected seizure at last admission-trial of Keppra but d/c'd prior to discharge. UDS neg. Eval by [facility] in ED. MRI brain and MRA head and neck all wnl. Trop neg x3, TSH, B12, Folate, lipid wnl.   -MIVF at 100/hr and regular diet   -continue home ASA, Lipitor 40mg and Plavix 75mg   -Fu daily CBC/CMP  -f/u Echo   -Allow for Permissive hypertension  -Neurology  Following - f/u EEG  -f/u PT/OT/CM- PT not indicated as Pt was up ambulating in room at baseline      2. Dizziness:  appears to be a chronic issue for which she has had extensive negative work-up. -Repeat testing as above  -consider trial of meclizine after testing complete  -consult neuro-  Appreciate recs. -cardiology consult to interrogate loop recorder      3. Headache -improved. CT head unremarkable. Continue TIA work-up as above  -PRN Tylenol     4. HLD:  Last lipid panel November 2019 : Tg 116, HDL 51, LDL 50.8  - home lipitor 40mg daily      5. Nausea and Vomiting: Vomiting now resolved. Unclear etiology. May be secondary to dizziness. UA cloudy with nitrites ketones and 1+ bacteria -> concern for active cystitis although Pt without urinary symptoms  -CTX 1 g daily. -F/u UCx  -Compazine prn for nausea     6. COVID PUI: having n/v and concern for COVID. CRP elevated to 0.89, d-dimer & LD wnl.    -f/u Sars-CoV2   -f/u remainder Covid labs and RVP  -contact and droplet precautions     7. Pre-Diabetes: HA1c 5.7.   Glucose stable during admission -maintain healthy diet and follow up with PCP   -daily BMP     FEN/GI - MIVF, Reg diet. Activity - As tolerated  DVT prophylaxis - Lovenox  GI prophylaxis -  Not indicated  Disposition - Plan to d/c to - pending. Need PT/OT recs      CODE STATUS:  Full code    The patient will be discussed with Dr. James Hanks (attending)     I appreciate the opportunity to participate in the care of this patient,  Demetra Irene DO  Family Medicine Resident         Subjective / Objective     Subjective no concerns or complaints. Temp (24hrs), Av.2 °F (36.8 °C), Min:98 °F (36.7 °C), Max:98.4 °F (36.9 °C)     Objective  Respiratory:   O2 Device: Room air   General: No acute distress. Alert. Cooperative. Head: Normocephalic. Atraumatic. Eyes:             Conjunctiva pink. Sclera white. PERRL. Ears:             Ear canals patent. TM non-erythematous. Nose:             Septum midline. Mucosa pink. No drainage. Throat: Mucosa pink. Moist mucous membranes. No tonsillar exudates or erythema. Palate movement equal bilaterally. Neck: Supple. Normal ROM. No stiffness. Respiratory: CTAB. No w/r/r/c.   Cardiovascular: RRR. Normal S1,S2. No m/r/g. Pulses 2+ throughout. GI: + bowel sounds. Nontender. No rebound tenderness or guarding. Nondistended. Extremities: No edema. No palpable cord. No tenderness. Musculoskeletal: Full ROM in all extremities. Neuro: CN II-XII grossly intact. Strength 5/5 in all extremities. Sensation intact in all extremities. DTRs 2+ throughout. Neuro exam from admission- (No dysmetria. Normal finger-to-nose testing. No dysdiadochokinesia. Heel-to-toe testing normal.  Gait and Romberg not tested.)   Skin: Clear. No rashes. No ulcers. I/O:  Date 20 - 20 - 20   Shift  24 Hour Total 9524-8339 3299-3976 24 Hour Total   INTAKE   P.O. 240 60 300        P. O. 240 60 300      I. V.(mL/kg/hr) 100(0.1)  100        Volume (0.9% sodium chloride infusion) 100  100      Shift Total(mL/kg) 340(4.7) 60(0.8) 400(5.5)      OUTPUT   Urine(mL/kg/hr)  650 650        Urine Voided  650 650      Shift Total(mL/kg)  650(9) 650(9)       -590 -250      Weight (kg) 72.1 72.1 72.1 72.1 72.1 72.1       Inpatient Medications  Current Facility-Administered Medications   Medication Dose Route Frequency    acetaminophen (TYLENOL) tablet 650 mg  650 mg Oral Q4H PRN    Or    acetaminophen (TYLENOL) solution 650 mg  650 mg Per NG tube Q4H PRN    Or    acetaminophen (TYLENOL) suppository 650 mg  650 mg Rectal Q4H PRN    0.9% sodium chloride infusion  100 mL/hr IntraVENous CONTINUOUS    aspirin chewable tablet 81 mg  81 mg Oral DAILY    atorvastatin (LIPITOR) tablet 40 mg  40 mg Oral QHS    clopidogreL (PLAVIX) tablet 75 mg  75 mg Oral QHS    prochlorperazine (COMPAZINE) injection 5 mg  5 mg IntraVENous Q6H PRN    sodium chloride (NS) flush 5-40 mL  5-40 mL IntraVENous Q8H    sodium chloride (NS) flush 5-40 mL  5-40 mL IntraVENous PRN    heparin (porcine) injection 5,000 Units  5,000 Units SubCUTAneous Q8H    cefTRIAXone (ROCEPHIN) 1 g in 0.9% sodium chloride (MBP/ADV) 50 mL  1 g IntraVENous Q24H         Allergies  Allergies   Allergen Reactions    Ampicillin Hives    Bacitracin Other (comments)    Bactrim [Sulfamethoprim] Hives         CBC:  Recent Labs     06/02/20  0102 06/01/20  1311   WBC 9.5 20.7*   HGB 13.4 15.5   HCT 39.7 46.3    494       Metabolic Panel:  Recent Labs     06/02/20  0102 06/01/20  1311    140   K 3.6 4.1   * 109*   CO2 25 26   BUN 18 20   CREA 0.78 1.04*   GLU 94 117*   CA 8.3* 8.9   ALB  --  4.2   ALT  --  30   INR  --  1.0           Imaging/procedures:    CT Head  IMPRESSION:   No acute intracranial process.     CXR  IMPRESSION: No acute cardiopulmonary process seen    MRI Brain   IMPRESSION:   1. No acute or significant intracranial abnormality. MRA head    IMPRESSION:    1.  No evidence of significant stenosis or aneurysm. MRA Neck   IMPRESSION:    1. No evidence of flow-limiting stenosis.   2. Mild stenosis (less than 50% by NASCET criteria) of the proximal right  internal carotid artery.          For Billing    Chief Complaint   Patient presents with    Vomiting    Headache       Hospital Problems  Date Reviewed: 3/11/2020          Codes Class Noted POA    * (Principal) CVA (cerebral vascular accident) ([facility] [address] ICD-10-CM: I63.9  ICD-9-CM: 434.91  6/1/2020 Unknown        SOB (shortness of breath) ICD-10-CM: R06.02  ICD-9-CM: 786.05  12/18/2015 Yes        Dizziness ICD-10-CM: R42  ICD-9-CM: 780.4  12/18/2015 Yes

## 2020-06-01 NOTE — PROGRESS NOTES
Spiritual Care Assessment/Progress Note  [address]      NAME: Jolynn Dunbar      MRN: [id number]  AGE: 78 y.o. SEX: female  [place] Affiliation: Evangelical   Language: English     6/1/2020     Total Time (in minutes): 30     Spiritual Assessment begun in [facility] EMERGENCY DEPT through conversation with:         [x]Patient        [] Family    [] Friend(s)        Reason for Consult:  Other (comment)(Code Stroke)     Spiritual beliefs: (Please include comment if needed)     [x] Identifies with a sunil tradition:    Manual Northern      [] Supported by a sunil community:            [] Claims no spiritual orientation:           [] Seeking spiritual identity:                [] Adheres to an individual form of spirituality:           [] Not able to assess:                           Identified resources for coping:      [] Prayer                               [] Music                  [] Guided Imagery     [] Family/friends                 [] Pet visits     [] Devotional reading                         [x] Unknown     [] Other:                                         Interventions offered during this visit: (See comments for more details)    Patient Interventions: Affirmation of emotions/emotional suffering, Affirmation of sunil, Catharsis/review of pertinent events in supportive environment, Iconic (affirming the presence of God/Higher Power), Initial/Spiritual assessment, patient floor, Prayer (assurance of), Prayer (actual)           Plan of Care:     [] Support spiritual and/or cultural needs    [] Support AMD and/or advance care planning process      [] Support grieving process   [] Coordinate Rites and/or Rituals    [] Coordination with community clergy   [] No spiritual needs identified at this time   [] Detailed Plan of Care below (See Comments)  [] Make referral to Music Therapy  [] Make referral to Pet Therapy     [] Make referral to Addiction services  [] Make referral to [facility]  [] Make referral to Spiritual Care Partner  [] No future visits requested        [x] Follow up visits as needed     Comments: Responded to Code Stroke in the ER. Miss cotto was out for tests. When I returned, she was alone in the room. She shared she has had these episodes for about 5 years. No one has been able to determine what causes them. She admitted to being under some stress. Her significant other has had memory decline and it is progressing. She has been with him for 5 years. Her  of 21 years  a while ago. Provided listening presence as she shared her struggles, challenges and changes in life. She has strong sunil in God and shared God is the only reasons she is still here. Provided calming presence as she began to tense up. Provided meditational words of comfort and song. Provided easing prayer. Her eyes closed, body appeared to be relaxed, told her not to open her eyes but rest.  Provided assurance of prayers and left the room.   Visited by: Joy Cuadra., MS., [address] [facility] [address] ([id number])

## 2020-06-01 NOTE — PROGRESS NOTES
[facility]: MED RECONCILIATION    Comments/Recommendations:     Patient able to confirm name, , allergies and preferred pharmacy  Removed gabapentin- pt no longer taking         Information obtained from: Patient    Allergies: Ampicillin; Bacitracin; and Bactrim [sulfamethoprim]    Prior to Admission Medications:     Prior to Admission Medications   Prescriptions Last Dose Informant Patient Reported? Taking?   aspirin 81 mg chewable tablet 2020 at Unknown time Self No Yes   Sig: Take 1 Tab by mouth daily. atorvastatin (LIPITOR) 40 mg tablet 2020 at Unknown time Self Yes Yes   Sig: Take 40 mg by mouth nightly. clopidogreL (Plavix) 75 mg tab 2020 at Unknown time Self No Yes   Sig: Take 1 Tab by mouth nightly. Facility-Administered Medications: None           [employer]. EUNICE.    Clinical Staff Pharmacist  [address] [place] [address]  [phone]

## 2020-06-01 NOTE — PROGRESS NOTES
[address] IN REPORT:    Verbal report received from Cherie Romano RN (name) on [facility]  being received from ED (unit) for routine progression of care      Report consisted of patients Situation, Background, Assessment and   Recommendations(SBAR). Information from the following report(s) SBAR, Kardex and Cardiac Rhythm NSR was reviewed with the receiving nurse. Opportunity for questions and clarification was provided. Assessment completed upon patients arrival to unit and care assumed. 1745 patient arrived to unit. 1820 MRI checklist completed and sent to MRI. Called to [facility] patient refused lovenox states \"made me vomit for days\", Family Practice did say they would discontinue and order SQ heparin. 1930 Bedside and Verbal shift change report given to Jurgen Candelario RN (oncoming nurse) by Cherie Romano RN (offgoing nurse). Report included the following information SBAR, Kardex and Cardiac Rhythm NSR.

## 2020-06-02 ENCOUNTER — APPOINTMENT (OUTPATIENT)
Dept: NON INVASIVE DIAGNOSTICS | Age: 70
End: 2020-06-02
Attending: STUDENT IN AN ORGANIZED HEALTH CARE EDUCATION/TRAINING PROGRAM
Payer: MEDICARE

## 2020-06-02 VITALS
HEART RATE: 85 BPM | BODY MASS INDEX: 26.82 KG/M2 | OXYGEN SATURATION: 96 % | TEMPERATURE: 98.1 F | RESPIRATION RATE: 20 BRPM | HEIGHT: 65 IN | SYSTOLIC BLOOD PRESSURE: 134 MMHG | DIASTOLIC BLOOD PRESSURE: 63 MMHG | WEIGHT: 161 LBS

## 2020-06-02 LAB
ANION GAP SERPL CALC-SCNC: 6 MMOL/L (ref 5–15)
ATRIAL RATE: 99 BPM
AV VELOCITY RATIO: 0.62
B PERT DNA SPEC QL NAA+PROBE: NOT DETECTED
BASOPHILS # BLD: 0 K/UL (ref 0–0.1)
BASOPHILS NFR BLD: 0 % (ref 0–1)
BORDETELLA PARAPERTUSSIS PCR, BORPAR: NOT DETECTED
BUN SERPL-MCNC: 18 MG/DL (ref 6–20)
BUN/CREAT SERPL: 23 (ref 12–20)
C PNEUM DNA SPEC QL NAA+PROBE: NOT DETECTED
CALCIUM SERPL-MCNC: 8.3 MG/DL (ref 8.5–10.1)
CALCULATED P AXIS, ECG09: 63 DEGREES
CALCULATED R AXIS, ECG10: 16 DEGREES
CALCULATED T AXIS, ECG11: 57 DEGREES
CHLORIDE SERPL-SCNC: 109 MMOL/L (ref 97–108)
CHOLEST SERPL-MCNC: 132 MG/DL
CO2 SERPL-SCNC: 25 MMOL/L (ref 21–32)
CREAT SERPL-MCNC: 0.78 MG/DL (ref 0.55–1.02)
DIAGNOSIS, 93000: NORMAL
DIFFERENTIAL METHOD BLD: ABNORMAL
ECHO AO ROOT DIAM: 3.34 CM
ECHO AV AREA PEAK VELOCITY: 1.7 CM2
ECHO AV PEAK GRADIENT: 5.1 MMHG
ECHO AV PEAK VELOCITY: 113.22 CM/S
ECHO EST RA PRESSURE: 3 MMHG
ECHO LA MAJOR AXIS: 2.86 CM
ECHO LA TO AORTIC ROOT RATIO: 0.85
ECHO LA VOL 2C: 46.67 ML (ref 22–52)
ECHO LA VOL 4C: 27.12 ML (ref 22–52)
ECHO LA VOL BP: 38.59 ML (ref 22–52)
ECHO LA VOL/BSA BIPLANE: 21.39 ML/M2 (ref 16–28)
ECHO LA VOLUME INDEX A2C: 25.87 ML/M2 (ref 16–28)
ECHO LA VOLUME INDEX A4C: 15.03 ML/M2 (ref 16–28)
ECHO LV INTERNAL DIMENSION DIASTOLIC: 5.16 CM (ref 3.9–5.3)
ECHO LV INTERNAL DIMENSION SYSTOLIC: 3.47 CM
ECHO LV IVSD: 0.84 CM (ref 0.6–0.9)
ECHO LV MASS 2D: 178 G (ref 67–162)
ECHO LV MASS INDEX 2D: 98.7 G/M2 (ref 43–95)
ECHO LV POSTERIOR WALL DIASTOLIC: 0.85 CM (ref 0.6–0.9)
ECHO LVOT DIAM: 1.87 CM
ECHO LVOT PEAK GRADIENT: 1.9 MMHG
ECHO LVOT PEAK VELOCITY: 69.78 CM/S
ECHO MV A VELOCITY: 87.92 CM/S
ECHO MV E DECELERATION TIME (DT): 239.1 MS
ECHO MV E VELOCITY: 67.05 CM/S
ECHO MV E/A RATIO: 0.8
ECHO MV REGURGITANT PEAK GRADIENT: 17.7 MMHG
ECHO MV REGURGITANT PEAK VELOCITY: 210.1 CM/S
ECHO PULMONARY ARTERY SYSTOLIC PRESSURE (PASP): 25.3 MMHG
ECHO PV MAX VELOCITY: 61.6 CM/S
ECHO PV PEAK GRADIENT: 1.5 MMHG
ECHO RIGHT VENTRICULAR SYSTOLIC PRESSURE (RVSP): 25.3 MMHG
ECHO RV INTERNAL DIMENSION: 3.26 CM
ECHO TV REGURGITANT MAX VELOCITY: 236.28 CM/S
ECHO TV REGURGITANT PEAK GRADIENT: 22.3 MMHG
EOSINOPHIL # BLD: 0.2 K/UL (ref 0–0.4)
EOSINOPHIL NFR BLD: 2 % (ref 0–7)
ERYTHROCYTE [DISTWIDTH] IN BLOOD BY AUTOMATED COUNT: 13.1 % (ref 11.5–14.5)
EST. AVERAGE GLUCOSE BLD GHB EST-MCNC: 117 MG/DL
FERRITIN SERPL-MCNC: 32 NG/ML (ref 26–388)
FLUAV H1 2009 PAND RNA SPEC QL NAA+PROBE: NOT DETECTED
FLUAV H1 RNA SPEC QL NAA+PROBE: NOT DETECTED
FLUAV H3 RNA SPEC QL NAA+PROBE: NOT DETECTED
FLUAV SUBTYP SPEC NAA+PROBE: NOT DETECTED
FLUBV RNA SPEC QL NAA+PROBE: NOT DETECTED
GLUCOSE SERPL-MCNC: 94 MG/DL (ref 65–100)
HADV DNA SPEC QL NAA+PROBE: NOT DETECTED
HBA1C MFR BLD: 5.7 % (ref 4–5.6)
HCOV 229E RNA SPEC QL NAA+PROBE: NOT DETECTED
HCOV HKU1 RNA SPEC QL NAA+PROBE: NOT DETECTED
HCOV NL63 RNA SPEC QL NAA+PROBE: NOT DETECTED
HCOV OC43 RNA SPEC QL NAA+PROBE: NOT DETECTED
HCT VFR BLD AUTO: 39.7 % (ref 35–47)
HDLC SERPL-MCNC: 51 MG/DL
HDLC SERPL: 2.6 {RATIO} (ref 0–5)
HGB BLD-MCNC: 13.4 G/DL (ref 11.5–16)
HMPV RNA SPEC QL NAA+PROBE: NOT DETECTED
HPIV1 RNA SPEC QL NAA+PROBE: NOT DETECTED
HPIV2 RNA SPEC QL NAA+PROBE: NOT DETECTED
HPIV3 RNA SPEC QL NAA+PROBE: NOT DETECTED
HPIV4 RNA SPEC QL NAA+PROBE: NOT DETECTED
IMM GRANULOCYTES # BLD AUTO: 0 K/UL (ref 0–0.04)
IMM GRANULOCYTES NFR BLD AUTO: 0 % (ref 0–0.5)
LDLC SERPL CALC-MCNC: 56.4 MG/DL (ref 0–100)
LIPID PROFILE,FLP: NORMAL
LVFS 2D: 32.62 %
LYMPHOCYTES # BLD: 4 K/UL (ref 0.8–3.5)
LYMPHOCYTES NFR BLD: 43 % (ref 12–49)
M PNEUMO DNA SPEC QL NAA+PROBE: NOT DETECTED
MCH RBC QN AUTO: 29.2 PG (ref 26–34)
MCHC RBC AUTO-ENTMCNC: 33.8 G/DL (ref 30–36.5)
MCV RBC AUTO: 86.5 FL (ref 80–99)
MONOCYTES # BLD: 0.4 K/UL (ref 0–1)
MONOCYTES NFR BLD: 4 % (ref 5–13)
MV DEC SLOPE: 2.8
NEUTS SEG # BLD: 4.8 K/UL (ref 1.8–8)
NEUTS SEG NFR BLD: 51 % (ref 32–75)
NRBC # BLD: 0 K/UL (ref 0–0.01)
NRBC BLD-RTO: 0 PER 100 WBC
P-R INTERVAL, ECG05: 120 MS
PLATELET # BLD AUTO: 249 K/UL (ref 150–400)
PMV BLD AUTO: 9.5 FL (ref 8.9–12.9)
POTASSIUM SERPL-SCNC: 3.6 MMOL/L (ref 3.5–5.1)
PULMONARY ARTERY END DIASTOLIC PRESSURE: 6.3 MMHG
PULMONARY ARTERY MEAN PRESURE: 12.6 MMHG
PV END DIASTOLIC VELOCITY: 0.9 MMHG
Q-T INTERVAL, ECG07: 370 MS
QRS DURATION, ECG06: 82 MS
QTC CALCULATION (BEZET), ECG08: 474 MS
RBC # BLD AUTO: 4.59 M/UL (ref 3.8–5.2)
RSV RNA SPEC QL NAA+PROBE: NOT DETECTED
RV+EV RNA SPEC QL NAA+PROBE: NOT DETECTED
SARS-COV-2, COV2: NOT DETECTED
SODIUM SERPL-SCNC: 140 MMOL/L (ref 136–145)
SOURCE, COVRS: NORMAL
SPECIMEN SOURCE, FCOV2M: NORMAL
TRIGL SERPL-MCNC: 123 MG/DL (ref ?–150)
TROPONIN I SERPL-MCNC: <0.05 NG/ML
VENTRICULAR RATE, ECG03: 99 BPM
VLDLC SERPL CALC-MCNC: 24.6 MG/DL
WBC # BLD AUTO: 9.5 K/UL (ref 3.6–11)

## 2020-06-02 PROCEDURE — 99218 HC RM OBSERVATION: CPT

## 2020-06-02 PROCEDURE — 93306 TTE W/DOPPLER COMPLETE: CPT

## 2020-06-02 PROCEDURE — 84484 ASSAY OF TROPONIN QUANT: CPT

## 2020-06-02 PROCEDURE — 80061 LIPID PANEL: CPT

## 2020-06-02 PROCEDURE — 85025 COMPLETE CBC W/AUTO DIFF WBC: CPT

## 2020-06-02 PROCEDURE — 36415 COLL VENOUS BLD VENIPUNCTURE: CPT

## 2020-06-02 PROCEDURE — 96372 THER/PROPH/DIAG INJ SC/IM: CPT

## 2020-06-02 PROCEDURE — 80048 BASIC METABOLIC PNL TOTAL CA: CPT

## 2020-06-02 PROCEDURE — 74011250636 HC RX REV CODE- 250/636: Performed by: STUDENT IN AN ORGANIZED HEALTH CARE EDUCATION/TRAINING PROGRAM

## 2020-06-02 PROCEDURE — 74011250637 HC RX REV CODE- 250/637: Performed by: STUDENT IN AN ORGANIZED HEALTH CARE EDUCATION/TRAINING PROGRAM

## 2020-06-02 RX ORDER — CEPHALEXIN 500 MG/1
500 CAPSULE ORAL 2 TIMES DAILY
Qty: 12 CAP | Refills: 0 | Status: SHIPPED | OUTPATIENT
Start: 2020-06-02 | End: 2020-06-08

## 2020-06-02 RX ADMIN — Medication 10 ML: at 05:17

## 2020-06-02 RX ADMIN — HEPARIN SODIUM 5000 UNITS: 5000 INJECTION INTRAVENOUS; SUBCUTANEOUS at 05:15

## 2020-06-02 RX ADMIN — ASPIRIN 81 MG 81 MG: 81 TABLET ORAL at 10:54

## 2020-06-02 RX ADMIN — Medication 10 ML: at 14:00

## 2020-06-02 NOTE — ACP (ADVANCE CARE PLANNING)
responded to an in-basket request to assist Mrs. Bridges with an Advanced Medical Directive on the [facility]. Care Tele unit. Mrs. Luz Maria Lares is on droplet plus contact precautions at this time. Collaborated with her RN who agreed to take in the AMD form, the Your Right To Decide Brochure, and the spiritual care card once she goes back into the room. 's will follow up as able and/or needed  [employer]. Elida Elizabeth.      Paging Service: [phone])

## 2020-06-02 NOTE — PROGRESS NOTES
SPEECH THERAPY SCREENING:  SERVICES ARE NOT INDICATED AT THIS TIME    [facility] based on results obtained during the nursing admission assessment. The patients chart was reviewed and the patient is not appropriate for a skilled therapy evaluation at this time. Please consult speech therapy if any therapy needs arise. Thank you. She passed the STAND. RN reports no dysphagia or speech issues. Evaluation deferred at this time.   Kiko Ramos, SLP

## 2020-06-02 NOTE — PROGRESS NOTES
Physical Therapy Note:    Orders acknowledged, chart reviewed, discussed with RN. PT evaluation deferred. RN reports pt up ad romi in room and currently at baseline regarding symptoms and mobility. Skilled PT not indicated and orders completed. Please re-consult should needs change. Thank you.     Power Jacques, PT, DPT, Luis A Georges

## 2020-06-02 NOTE — PROGRESS NOTES
Problem: Falls - Risk of  Goal: *Absence of Falls  Description: Document Francis Lira Fall Risk and appropriate interventions in the flowsheet.   Outcome: Progressing Towards Goal  Note: Fall Risk Interventions:  Mobility Interventions: Bed/chair exit alarm, Patient to call before getting OOB         Medication Interventions: Bed/chair exit alarm, Patient to call before getting OOB, Teach patient to arise slowly    Elimination Interventions: Bed/chair exit alarm, Call light in reach    History of Falls Interventions: Bed/chair exit alarm         Problem: TIA/CVA Stroke: 0-24 hours  Goal: Off Pathway (Use only if patient is Off Pathway)  Outcome: Progressing Towards Goal     Problem: TIA/CVA Stroke: 0-24 hours  Goal: Activity/Safety  Outcome: Progressing Towards Goal     Problem: TIA/CVA Stroke: 0-24 hours  Goal: Off Pathway (Use only if patient is Off Pathway)  Outcome: Progressing Towards Goal  Goal: Activity/Safety  Outcome: Progressing Towards Goal  Goal: Consults, if ordered  Outcome: Progressing Towards Goal  Goal: Diagnostic Test/Procedures  Outcome: Progressing Towards Goal  Goal: Nutrition/Diet  Outcome: Progressing Towards Goal  Goal: Discharge Planning  Outcome: Progressing Towards Goal  Goal: Medications  Outcome: Progressing Towards Goal  Goal: Treatments/Interventions/Procedures  Outcome: Progressing Towards Goal

## 2020-06-02 NOTE — PROGRESS NOTES
2112 Shift Change:  Bedside and Verbal shift change report given to Kaykay Huffman RN (oncoming nurse) by Nena Pineda (offgoing nurse). Report included the following information SBAR, Kardex, Procedure Summary and Cardiac Rhythm NSR . Shift Summary:  2112 Patient back on unit from [facility], [facility] has ordered COVID testing and resp panel, patient moved to room 328 due to COVID testing    2127 COVID and resp panel completed     Stroke Education provided to patient and the following topics were discussed    1. Patients personal risk factors for stroke are hypertension, smoking, family history, hyperlipidemia, elevated HgA1C and prior stroke    2. Warning signs of Stroke:        * Sudden numbness or weakness of the face, arm or leg, especially on one side of          The body            * Sudden confusion, trouble speaking or understanding        * Sudden trouble seeing in one or both eyes        * Sudden trouble walking, dizziness, loss of balance or coordination        * Sudden severe headache with no known cause      3. Importance of activation Emergency Medical Services ( 9-1-1 ) immediately if experience any warning signs of stroke. 4. Be sure and schedule a follow-up appointment with your primary care doctor or any specialists as instructed. 5. You must take medicine every day to treat your risk factors for stroke. Be sure to take your medicines exactly as your doctor tells you: no more, no less. Know what your medicines are for , what they do. Anti-thrombotics /anticoagulants can help prevent strokes. You are taking the following medicine(s)   Lipitor, plavix, asa    6. Smoking and second-hand smoke greatly increase your risk of stroke, cardiovascular disease and death. Smoking history never    7. Information provided was [facility] Stroke Education Binder    8.  Documentation of teaching completed in Patient Education Activity and on Care Plan with teaching response noted?  yes        2300 patient states she has not voided since being on our floor. She voided in the ED. Patient states she voids 1-2 times a day normally. Patient has voided, no nausea or vomiting reported    No acute changes over night    End of Shift Report:  Bedside and Verbal shift change report given to Maria Elena Kenyon (oncoming nurse) by Rene Michel RN (offgoing nurse). Report included the following information SBAR, Kardex, Procedure Summary and Cardiac Rhythm NSR .

## 2020-06-02 NOTE — PROGRESS NOTES
Occupational Therapy Note:  Orders acknowledged and chart reviewed. Patient is up ad romi in the room and currently at her baseline in regards to functional abilities. Spoke with the patient via phone and she confirms she has no skilled OT needs. Provided verbal education for the signs and symptoms of a stroke based on admitting symptoms and CVA r/o; Patient able to perform teach back for BEFAST acronym and confirms understanding. Patient has no skilled OT needs and order will be completed at this time.     Daphne Elias, OTR/L

## 2020-06-02 NOTE — ADVANCED PRACTICE NURSE
Consult received to interrogate implantable loop 2/2 TIA. Last down load was 2 am 6/1/2020. Spoke to Karthik Viveros in EP to confirm. RN will need to use [employer] care link to download information. Nursing staff aware. 1 pm data transmitted. Awaiting report from Art Leblanc.

## 2020-06-02 NOTE — PROGRESS NOTES
responded to an in-basket request to assist Mrs. Bridges with an Advanced Medical Directive on the [facility]. Care Tele unit. Mrs. Gama Valenzuela is on droplet plus contact precautions at this time. Collaborated with her RN who agreed to take in the AMD form, the Your Right To Decide Brochure, and the spiritual care card once she goes back into the room. 's will follow up as able and/or needed  [employer]. Ant [place].      Paging Service: [phone])

## 2020-06-02 NOTE — PROGRESS NOTES
Transition Plan of Care  RUR N/A OBS      Plan:  1. Monitor patient's response to treatment. 2. No needs-PT evaluation deferred. RN reports pt up ad romi in room and currently at baseline regarding symptoms and mobility. Skilled PT not indicated and orders completed. Please re-consult should needs change  3. Case Management to follow for needs.   Bautista Childers BS

## 2020-06-02 NOTE — DISCHARGE SUMMARY
[address] [facility] X[address] Fernandez [address]   Office [phone]  Fax [phone]       Discharge / Transfer / Off-Service Note     Name: Robert Ceja MRN: [id number]  Sex: Female   YOB: 1950  Age: 78 y.o. PCP: Reyna Boyce MD     Date of admission: 6/1/2020  Date of discharge/transfer: 6/2/2020    Attending physician at admission: Dr. Parminder Tolliver  Attending physician at discharge/transfer: Dr. Parminder Tolliver  Resident physician at discharge/transfer: Man Leonard DO     Consultants during hospitalization  Neurology    Cardiology      Admission diagnoses   CVA (cerebral vascular accident) [facility]) [I63.9]    Recommended follow-up after discharge  · PCP  · Cardiology as scheduled      Follow up tests after discharge   Neurology recommends PT for vestibular therapy    Pending Test Results:  1. Final Urine culture  2. IL-6 marker    MEDICATIONS TO START  1. Keflex 500mg one tablet BID x12 doses (start 6/2 and end 6/8)   2. Recommended taking Probiotic daily while taking Abbx.        ----------------------------------------------------------------------------------------------------------------------------  The patient was well enough to be discharged from the hospital. However, because they were inpatient in a hospital, they are at greater risk of having been exposed to the coronavirus. PLEASE stay inside and self-quarantine for 14 days to prevent further spread of the coronavirus.  ------------------------------------------------------------------------------------------------------------------    History of Present Illness  Per the admitting physicians H&P \"Tasneem Head is a 78 y.o. female with known history of cryptogenic strokes (4 noted episodes) and dyslipidemia who presents to the ER complaining of headache, dizziness and nausea. Patient states she had been doing fine up till about 8 AM this morning.   Patient states she had a transient episode of a mild, right-sided headache with accompanying dizziness, loss of balance and inability to note her positional changes. Patient states \"I cannot tell where my feet were. It was as if there were holes on the ground. \"  She also endorses associated nausea with 4 episodes of nonbloody nonbilious emesis. She describes her dizziness as feeling as though the room around her was spinning and states this has been a chronic issue for which she has been followed by neurology and audiology outpatient. She admits to recurrent episodes with rapid changes in position or with side-to-side head movement. She denies any abdominal pain, fevers, chills or night sweats. She denies any urinary symptoms to include dysuria, urgency, frequency, or suprapubic tenderness or flank pain. Denies any recent sick contacts, travel or visits to healthcare facilities.     In ER vital signs were remarkable for heart rate of 106. Labs were notable for leukocytosis to 20.7 creatinine of 1.04 with baseline of 0.8-0.9. Urinalysis showed cloudy urine with positive nitrites, ketones and 1+ bacteria with moderate epithelial cells. [facility] course    1.  TIA: history of recurrent TIAs. This is her 5th TIA workup since 2012.  See's neuro and cards outpatient and has active implanted loop recorder. CT head POA neg. EKG NSR with occ PVC's. TIA work-up 11/2019- unremarkable. Suspected seizure at last admission-trial of Keppra but d/c'd prior to discharge. UDS neg. Eval by [facility] in ED. MRI brain and MRA head and neck wnl. Trop neg x3, TSH, B12, Folate, lipid wnl. Allowed for permissive HTN and BP remained stable. ECHO 6/2 EF 55 - 60%. No regional wall motion abnormality noted. PT did not feel need for outpatient therapy. -continue home ASA 81mg, Lipitor 40mg and Plavix 75mg   -follow up with PCP outpatient      2. Dizziness:  appears to be a chronic issue for which she has had extensive negative work-up.  ECHO from 10/2019 showed EF 56-60% and repeat on 6/2 showed EF 55-60% nwma. Testing as stated above was wnl. Loop recorder interrogated by cardiology which did not show any signs Afib or arrhythmia.  -follow up outpatient with PCP  -Follow up with Cardiology as scheduled for management of Loop Recorder.      3. Headache -RESOLVED      4.  HLD:  Lipid panel during admission (, , HDL 51, LDL 56.4)   - Continue home lipitor 40mg daily      5. Nausea and Vomiting: Vomiting now resolved.   Unclear etiology.  May be secondary to dizziness.   UA cloudy with nitrites ketones and 1+ bacteria -> concern for active cystitis although Pt without urinary symptoms and Urine culture prelim reading is growing Gram Negative Rods. S/p CTX 1g x 1 dose. -START Keflex 500mg one tablet BID for 12 doses (starting 6/2 and ending 6/8) to complete a 7 day course.   -advised to take a probiotic daily while taking Abx.    -Follow up UA outpatient after completion of the Abx.      6. COVID PUI: SARS-CoV2 NEGATIVE. Due to having n/v there was concern for COVID. CRP elevated to 0.89, d-dimer & LD wnl. RVP wnl   -f/u IL-6 as this was pending at discharge     7. Pre-Diabetes: HA1c 5.7. Glucose stable during admission. -maintain healthy diet and follow up with PCP outpatient       Physical exam at discharge:    [facility]. General Oriented to person, place, and time and well-developed. Appears well-nourished, no distress. Not diaphoretic. HENT Head Normocephalic and atraumatic. Eyes Conjunctivae are normal, no discharge. No scleral icterus. Nose Nose normal, clear turbinates. Oral Oropharynx is clear and moist. No oropharyngeal exudate. Neck No thyromegaly present. No cervical adenopathy. Cardio Normal rate, regular rhythm. Exam reveals no gallop and no friction rub. No murmur heard. No chest wall tenderness. Pulmonary Effort normal and breath sounds normal. No respiratory distress. No wheezes, no rales. Abdominal Soft. Bowel sounds normal. No distension.  No tenderness.  Deferred. Extremities No edema of lower extremities. No tenderness. Distal pulses intact. Neurological Alert and oriented to person, place, and time. Dermatology Skin is warm and dry. No rash noted. No erythema or pallor. Psychiatric Affect and judgment normal.        Condition at discharge: Stable.     Labs  Recent Labs     06/02/20  0102 06/01/20  1311   WBC 9.5 20.7*   HGB 13.4 15.5   HCT 39.7 46.3    301     Recent Labs     06/02/20  0102 06/01/20  1311    140   K 3.6 4.1   * 109*   CO2 25 26   BUN 18 20   CREA 0.78 1.04*   GLU 94 117*   CA 8.3* 8.9     Recent Labs     06/01/20  1311   ALT 30   AP 90   TBILI 0.6   TP 7.4   ALB 4.2   GLOB 3.2     Recent Labs     06/02/20  0102 06/01/20 2127 06/01/20  1915 06/01/20  1331 06/01/20  1311   TROIQ <0.05 <0.05 <0.05  --   --    INR  --   --   --   --  1.0   PTP  --   --   --   --  10.4   FERR  --  32  --   --   --    GLUCPOC  --   --   --  105*  --      Cultures  Results     Procedure Component Value Units Date/Time    RESPIRATORY PANEL,PCR,NASOPHARYNGEAL [[id number]] Collected:  06/01/20 2127    Order Status:  Completed Specimen:  Nasopharyngeal Updated:  06/02/20 0920     Adenovirus Not detected        Coronavirus 229E Not detected        Coronavirus HKU1 Not detected        Coronavirus CVNL63 Not detected        Coronavirus OC43 Not detected        Metapneumovirus Not detected        Rhinovirus and Enterovirus Not detected        Influenza A Not detected        Influenza A, subtype H1 Not detected        Influenza A, subtype H3 Not detected        INFLUENZA A H1N1 PCR Not detected        Influenza B Not detected        Parainfluenza 1 Not detected        Parainfluenza 2 Not detected        Parainfluenza 3 Not detected        Parainfluenza virus 4 Not detected        RSV by PCR Not detected        B. parapertussis, PCR Not detected        Bordetella pertussis - PCR Not detected        Chlamydophila pneumoniae DNA, QL, PCR Not detected        Mycoplasma pneumoniae DNA, QL, PCR Not detected       CULTURE, URINE [[id number]]  (Abnormal) Collected:  06/01/20 1504    Order Status:  Completed Specimen:  Urine from Clean catch Updated:  06/02/20 1435     Special Requests: NO SPECIAL REQUESTS        [place] Count --        >100,000  COLONIES/mL       Culture result: GRAM NEGATIVE RODS           ·     Procedures / Diagnostic Studies  · EEG- wnl   · ECHO-Pending at discharge     Imaging  Results from [place] encounter on 06/01/20   XR CHEST PA LAT    Narrative EXAM: XR CHEST PA LAT    INDICATION: tia, dizziness    COMPARISON: 9/13/2018. FINDINGS: PA and lateral radiographs of the chest demonstrate clear lungs. The  cardiac and mediastinal contours and pulmonary vascularity are normal. The bones  and soft tissues are within normal limits. A loop recorder is present. Impression IMPRESSION: No acute cardiopulmonary process seen          No results found for this or any previous visit. Results from [place] encounter on 06/01/20   CT CODE NEURO HEAD WO CONTRAST    Narrative CLINICAL HISTORY: Cerebellar infarct  INDICATION: Cerebellar infarct  COMPARISON: 10/15/2019. CT dose reduction was achieved through use of a standardized protocol tailored  for this examination and automatic exposure control for dose modulation. TECHNIQUE: Serial axial images with a collimation of 5 mm were obtained from the  skull base through the vertex    FINDINGS:   There is no evidence of an acute infarction, hemorrhage, or mass-effect. There  is no evidence of midline shift or hydrocephalus. Posterior fossa structures are  unremarkable. No extra-axial collections are seen. Mastoid air cells are well pneumatized and clear. There is no evidence of depressed skull fractures of soft tissue swelling. Impression IMPRESSION:   No acute intracranial process.           CT Head  IMPRESSION:   No acute intracranial process.     CXR  IMPRESSION: No acute cardiopulmonary process seen     MRI Brain   IMPRESSION:   1. No acute or significant intracranial abnormality.     MRA head    IMPRESSION:    1. No evidence of significant stenosis or aneurysm.        MRA Neck   IMPRESSION:    1. No evidence of flow-limiting stenosis. 2. Mild stenosis (less than 50% by NASCET criteria) of the proximal right  internal carotid artery. No procedure found. Chronic diagnoses   Problem List as of 6/2/2020 Date Reviewed: 3/11/2020          Codes Class Noted - Resolved    * (Principal) CVA (cerebral vascular accident) ([facility] ICD-10-CM: I63.9  ICD-9-CM: 434.91  6/1/2020 - Present        Seizure ([facility] [address] ICD-10-CM: R56.9  ICD-9-CM: 780.39  10/15/2019 - Present        Acute confusional state ICD-10-CM: F05  ICD-9-CM: 293.0  10/15/2019 - Present        TIA (transient ischemic attack) ICD-10-CM: G45.9  ICD-9-CM: 435.9  9/12/2018 - Present        High cholesterol ICD-10-CM: E78.00  ICD-9-CM: 272.0  8/13/2018 - Present        Stroke syndrome ICD-10-CM: WSH9422  ICD-9-CM: Honey Pat  4/24/2018 - Present        Advance care planning ICD-10-CM: Z71.89  ICD-9-CM: V65.49  7/27/2017 - Present        Vertigo ICD-10-CM: R42  ICD-9-CM: 780.4  6/13/2017 - Present        SOB (shortness of breath) ICD-10-CM: R06.02  ICD-9-CM: 786.05  12/18/2015 - Present        Dizziness ICD-10-CM: R42  ICD-9-CM: 780.4  12/18/2015 - Present        RESOLVED: History of CVA (cerebrovascular accident) ICD-10-CM: Z86.73  ICD-9-CM: V12.54  6/13/2017 - 4/24/2018              Discharge/Transfer Medications  Discharge Medication List as of 6/2/2020  5:05 PM      START taking these medications    Details   cephALEXin (KEFLEX) 500 mg capsule Take 1 Cap by mouth two (2) times a day for 12 doses.  Indications: bacterial urinary tract infection, Normal, Disp-12 Cap, R-0         CONTINUE these medications which have NOT CHANGED    Details   clopidogreL (Plavix) 75 mg tab Take 1 Tab by mouth nightly., Normal, Disp-90 Tab, R-3      aspirin 81 mg chewable tablet Take 1 Tab by mouth daily. , Print, Disp-30 Tab, R-0      atorvastatin (LIPITOR) 40 mg tablet Take 40 mg by mouth nightly., Historical Med              Diet:  Regular diet. Activity:  As tolerated    Disposition: Home or Self Care    Discharge instructions to patient/family  Please seek medical attention for any new or worsening symptoms particularly fever, chest pain, shortness of breath, abdominal pain, nausea, vomiting    Follow up plans/appointments  Follow-up Information     Follow up With Specialties Details Why Contact Bridgett Vicente NP Nurse Practitioner Go on 6/8/2020 transition of care after hospital admission 9:00AM  [address] [facility] [address]  [phone]      [facility], [address] [place] [address]  [phone]             [facility], [address] [place] [address] Medicine Resident     For Billing    Chief Complaint   Patient presents with    Vomiting   [facility]       Hospital Problems  Date Reviewed: 3/11/2020          Codes Class Noted POA    * (Principal) CVA (cerebral vascular accident) [facility]) ICD-10-CM: I63.9  ICD-9-CM: 434.91  6/1/2020 Unknown        SOB (shortness of breath) ICD-10-CM: R06.02  ICD-9-CM: 786.05  12/18/2015 Yes        Dizziness ICD-10-CM: R42  ICD-9-CM: 780.4  12/18/2015 Yes               [address] Medicine Residency Attending Addendum:    I was NOT present with the resident during the interview & examination of the patient. I personally interviewed the patient & repeated the critical or key portions of the exam.  I agree with the resident's note including their history, physical exam and assessment/plan.     Lavonne Stafford MD   Pt seen and examined on 6/4/2020

## 2020-06-02 NOTE — DISCHARGE INSTRUCTIONS
HOME DISCHARGE INSTRUCTIONS    Cassi Hanley / [id number] : 1950    Admission date: 2020 Discharge date: 2020 4:12 PM     Please bring this form with you to show your care provider at your follow-up appointment. Primary care provider:  Lucia Harvey MD    Discharging provider:  Livia Jorge DO  - Family Medicine Resident  Chidi Lorenz MD - Family Medicine Attending      You have been admitted to the hospital with the following diagnoses:    ACUTE DIAGNOSES:  CVA (cerebral vascular accident) ([facility] [I63.9]    . . . . . . . . . . . . . . . . . . . . . . . . . . . . . . . . . . . . . . . . . . . . . . . . . . . . . . . . . . . . . . . . . . . . . . . .   You are well enough to be discharged from the hospital. However, because you were inpatient in a hospital, you are at greater risk of having been exposed to the coronavirus. PLEASE stay inside and self-quarantine for 14 days to prevent further spread of the coronavirus. . . . . . . . . . . . . . . . . . . . . . . . . . . . . . . . . . . . . . . . . . . . . . . . . . . . . . . . . . . . . . . . . . . . . . . . . MEDICATION CHANGES  START Keflex 500mg one tablet twice a day for 12 doses (Start tonight, , and end on )   START Taking a daily Probiotic while taking the Antibiotics       No other changes were made to your medications, please take all your other home medicines as previously prescribed. . . . . . . . . . . . . . . . . . . . . . . . . . . . . . . . . . . . . . . . . . . . . . . . . . . . . . . . . . . . . . . . . . . . . . . . Jesus Barber      FOLLOW-UP CARE RECOMMENDATIONS:    Appointments  Follow-up Information     Follow up With Specialties Details Why Contact Info    Claudia Morris NP Nurse Practitioner Go on 2020 transition of care after hospital admission 9:00AM  [address] [place] [address]  [phone]               Follow-up tests needed:   1. UA after taking antibiotics to ensure resolution of infection     Pending test results: At the time of your discharge the following test results are still pendin. Final Urine culture  2. IL-6 marker  3. Final Echocardiogram reading. Please make sure you review these results with your outpatient follow-up provider(s). DIET/what to eat:  Regular Diet    ACTIVITY:  Activity as tolerated    Wound care: NONE    Equipment needed:  NONE    Specific symptoms to watch for: chest pain, shortness of breath, fever, chills, nausea, vomiting, diarrhea, change in mentation, falling, weakness, bleeding. What to do if new or unexpected symptoms occur? If you experience any of the above symptoms (or should other concerns or questions arise after discharge) please call your primary care physician. Return to the emergency room if you cannot get hold of your doctor. · It is very important that you keep your follow-up appointment(s). · Please bring discharge papers, medication list (and/or medication bottles) to your follow-up appointments for review by your outpatient provider(s). · Please check the list of medications and be sure it includes every medication (even non-prescription medications) that your provider wants you to take. · It is important that you take the medication exactly as they are prescribed. · Keep your medication in the bottles provided by the pharmacist and keep a list of the medication names, dosages, and times to be taken in your wallet. · Do not take other medications without consulting your doctor. · If you have any questions about your medications or other instructions, please talk to your nurse or care provider before you leave the hospital.     Information obtained by:     I understand that if any problems occur once I am at home I am to contact my physician. These instructions were explained to me and I had the opportunity to ask questions.     I understand and acknowledge receipt of the instructions indicated above.                                                                                                                                                Physician's or R.N.'s Signature                                                                  Date/Time                                                                                                                                              Patient or Representative Signature                                                          Date/Time

## 2020-06-02 NOTE — CONSULTS
Asked by Family Medicine to have loop monitor interrogated. There was no evidence of atrial fibrillation on interrogation since last ck in April 2020. Pls call if pt needs anything further.

## 2020-06-02 NOTE — PROGRESS NOTES
[facility] and [address] at [facility] at [facility] [address], [address] [place] [id number] [address]  [phone] Office  [phone] Facsimile           Referring: Dr. Anise Babinski    Chief Complaint   Patient presents with    Vomiting    Headache     50-year-old lady who comes into the hospital after having had abrupt change noting that she felt imbalance. She felt like she did not know where her feet were. This happened yesterday morning. It was transient mild. No focal weakness. No diplopia. No perioral numbness. No numbness or tingling. She does say that she has baseline vertigo we have actually seen her previously for this. If she has changes in position her side to side movement she will get vertigo. This was a bit unlike her previous. She has been up ambulatory about the room. She has had no difficulty with speech language or swallowing. No chest pain. No palpitations. No shortness of breath. No fever chills. She did have an episode of vomiting and because of that she has been placed on COVID precaution.       Past Medical History:   Diagnosis Date    Cancer ([facility]     CLL (chronic lymphocytic leukemia) ([facility]     Coronary artery disease     Dyslipidemia     Neurological disorder     TIA (transient ischemic attack) 9/2015    TIA (transient ischemic attack)        Past Surgical History:   Procedure Laterality Date    BREAST SURGERY PROCEDURE UNLISTED      HX GYN  2004    [address]    HX OTHER SURGICAL  1998    ganglion cyst removed- wrist R    HX OTHER SURGICAL  1998    breast surgery    HX OTHER SURGICAL  1990    fibrodema (left breast)     HX OTHER SURGICAL  1999    ganglin cyst of the right hand     RI INSERTION SUBQ CARDIAC RHYTHM MONITOR W/PRGRMG N/A 12/2/2019    LOOP RECORDER INSERT performed by Jan Ness MD at [facility]       Current Facility-Administered Medications   Medication Dose Route Frequency Provider Last Rate Last Dose    acetaminophen (TYLENOL) tablet 650 mg  650 mg Oral Q4H PRN Alicia Ozuna MD   650 mg at 06/01/20 1756    Or    acetaminophen (TYLENOL) solution 650 mg  650 mg Per NG tube Q4H PRN Alicia Ozuna MD        Or    acetaminophen (TYLENOL) suppository 650 mg  650 mg Rectal Q4H PRN Alicia Ozuna MD        0.9% sodium chloride infusion  100 mL/hr IntraVENous CONTINUOUS Alicia Ozuna  mL/hr at 06/01/20 2150 100 mL/hr at 06/01/20 2150    aspirin chewable tablet 81 mg  81 mg Oral DAILY Alicia Ozuna MD   81 mg at 06/02/20 1054    atorvastatin (LIPITOR) tablet 40 mg  40 mg Oral QHS Alicia Ozuna MD   40 mg at 06/01/20 2157    clopidogreL (PLAVIX) tablet 75 mg  75 mg Oral QHS Alicia Ozuna MD   75 mg at 06/01/20 2157    prochlorperazine (COMPAZINE) injection 5 mg  5 mg IntraVENous Q6H PRN Alicia Ozuna MD        sodium chloride (NS) flush 5-40 mL  5-40 mL IntraVENous Q8H Alicia Ozuna MD   10 mL at 06/02/20 0517    sodium chloride (NS) flush 5-40 mL  5-40 mL IntraVENous PRN Alicia Ozuna MD        heparin (porcine) injection 5,000 Units  5,000 Units SubCUTAneous Q8H Alicia Ozuna MD   5,000 Units at 06/02/20 0515    cefTRIAXone (ROCEPHIN) 1 g in 0.9% sodium chloride (MBP/ADV) 50 mL  1 g IntraVENous Q24H Alicia Ozuna  mL/hr at 06/01/20 2153 1 g at 06/01/20 2153        Allergies   Allergen Reactions    Ampicillin Hives    Bacitracin Other (comments)    Bactrim [Sulfamethoprim] Hives       Social History     Tobacco Use    Smoking status: Never Smoker    Smokeless tobacco: Never Used   Substance Use Topics    Alcohol use: No     Frequency: Patient refused    Drug use: No       Family History   Problem Relation Age of Onset    Cancer Mother         renal, bladder       Review of Systems  Pertinent positives and negatives as noted with remainder of comprehensive review negative      Examination  Visit Vitals  /63 (BP 1 Location: Right arm, BP Patient Position: At rest)   Pulse 90   Temp 98.1 °F (36.7 °C)   Resp 20   Ht 5' 5\" (1.651 m)   Wt 73.4 kg (161 lb 12.8 oz)   SpO2 96%   BMI 26.92 kg/m²     Pleasant, well appearing. Dress and grooming are appropriate. No scleral icterus is present. Oropharynx is clear. Supple neck without bruit appreciated. Heart regular. Pulses are symmetrical.  No edema in the lower extremities. Neurologically, she is awake, alert, and oriented with normal speech and language. Her cognition is normal.    Intact cranial nerves 2-12. No nystagmus. Visual fields full to confrontation. Disk margins are flat bilaterally. She has normal bulk and tone. She has no abnormal movement. She has no pronation or drift. She generates full strength in the upper and lower extremities to direct confrontational testing. Reflexes are symmetrical in the upper and lower extremities bilaterally. No pathologic reflexes are elicited. .  Finger nose finger and rapid alternating movements are normal.  Steady gait. No sensory deficit to primary modalities. Impression/Plan  57-year-old woman with recurrent vertigo and normal examination. MRI unremarkable. We will be complete and proceed with an EEG. If the EEG is unremarkable and then I would recommend no further neurologic evaluation. PT for vestibular therapy. Thanks for requesting consultation. Manav Umana MD    This note was created using voice recognition software. Despite editing, there may be syntax errors. This note will not be viewable in [address]

## 2020-06-03 ENCOUNTER — PATIENT OUTREACH (OUTPATIENT)
Dept: INTERNAL MEDICINE CLINIC | Age: 70
End: 2020-06-03

## 2020-06-03 LAB — IL6 SERPL-MCNC: 3.5 PG/ML (ref 0–15.5)

## 2020-06-03 NOTE — PROCEDURES
Richie Coleman [facility] 78  EEG    Name:  Sheyla Gordon  MR#:  [id number]  :  1950  ACCOUNT #:  [de-identified]  DATE OF SERVICE:  2020    REQUESTING PROVIDER:  Christian Connors MD    CLINICAL HISTORY:  An EEG is requested on this 67-year-old woman to evaluate for epileptiform abnormalities. MEDICATIONS:  Listed as Neurontin, Plavix, Lipitor. EEG REPORT:  This tracing is obtained during the awake state. During wakefulness, there are intermittent runs of posteriorly dominant and symmetrical low-to-medium amplitude 10 cycle per second activities which attenuate with eye opening. Lower voltage faster frequency activities are seen symmetrically over the anterior head regions. Hyperventilation not performed due to COVID-19 precautions. Intermittent photic stimulation little alters the tracing. Sleep is not attained.     INTERPRETATION:  This EEG recorded during the awake state is normal.      Bela Ng MD      [id number]  D:  2020 11:05  T:  2020 12:09  JOB #:  [id number]

## 2020-06-03 NOTE — PROGRESS NOTES
Patient was admitted to [place] on 2020 and discharged on 2020 for CVA. Patient was contacted within one business days of discharge. Top Discharge Challenges to be reviewed by the provider   Additional needs identified to be addressed with provider yes  labs Urine culture + E. Coli  Neurology recommends vestibular therapy    Discussed COVID-19 related testing which was available at this time. Test results were negative. Patient informed of results, if available? yes   Method of communication with provider : chart routing       Advance Care Planning:   Does patient have an Advance Directive:  patient declined education     Inpatient Readmission Risk score:   Was this a readmission? no     Patients top risk factors for readmission: medical condition  Interventions to address risk factors: patient aware of symptoms    Care Transition Nurse (CTN) contacted the patient by telephone to perform post hospital discharge assessment. Verified name and  with patient as identifiers. Provided introduction to self, and explanation of the CTN role. CTN reviewed discharge instructions, medical action plan and red flags with patient who verbalized understanding. Patient given an opportunity to ask questions and does not have any further questions or concerns at this time. The patient agrees to contact the PCP office for questions related to their healthcare. Medication reconciliation was performed with patient, who verbalizes understanding of administration of home medications. Advised obtaining a 90-day supply of all daily and as-needed medications. Referral to Pharm D needed: no     Home Health/Outpatient orders at discharge: none      Durable Medical Equipment ordered at discharge: None    Covid Risk Education    Patient has following risk factors of: no known risk factors.  Education provided regarding infection prevention, and signs and symptoms of COVID-19 and when to seek medical attention with patient who verbalized understanding. Discussed exposure protocols and quarantine From CDC: Are you at higher risk for severe illness?  and given an opportunity for questions and concerns. The patient agrees to contact her PCP office for questions related to COVID-19. For more information on steps you can take to protect yourself, see CDC's How to Protect Yourself     Patient/family/caregiver given information for GetWell Loop and agrees to enroll no  Patient's preferred e-mail: NA  Patient's preferred phone number: NA    Discussed follow-up appointments. If no appointment was previously scheduled, appointment scheduling offered: yes  [facility] follow up appointment(s):   Future Appointments   Date Time Provider Marsha Chongisti   6/8/2020  9:00 AM JYOTI Torres   6/15/2020 10:00 AM Tapan Marr MD [address] [place] [address]   1/6/2021  2:20 PM Haley Field NP [address] [place] [address]     Non[facility] follow up appointment(s): NA  Plan for follow-up call in 7-10 days based on severity of symptoms and risk factors. CTN provided contact information for future needs. Goals Addressed                 This Visit's Progress     Attend follow up appointments on schedule          06/05/20  · Will attend follow up appt with PCP scheduled 6/8       Understands red flags post discharge.           06/05/20  · Reviewed BE FAST acronym

## 2020-06-04 LAB
BACTERIA SPEC CULT: ABNORMAL
CC UR VC: ABNORMAL
SERVICE CMNT-IMP: ABNORMAL

## 2020-06-08 ENCOUNTER — OFFICE VISIT (OUTPATIENT)
Dept: FAMILY MEDICINE CLINIC | Age: 70
End: 2020-06-08

## 2020-06-08 VITALS
OXYGEN SATURATION: 96 % | DIASTOLIC BLOOD PRESSURE: 69 MMHG | BODY MASS INDEX: 26.16 KG/M2 | WEIGHT: 157 LBS | SYSTOLIC BLOOD PRESSURE: 104 MMHG | TEMPERATURE: 98.3 F | HEIGHT: 65 IN | HEART RATE: 91 BPM | RESPIRATION RATE: 18 BRPM

## 2020-06-08 DIAGNOSIS — R42 DIZZINESS: ICD-10-CM

## 2020-06-08 DIAGNOSIS — E78.5 HYPERLIPIDEMIA LDL GOAL <70: ICD-10-CM

## 2020-06-08 DIAGNOSIS — G45.9 TIA (TRANSIENT ISCHEMIC ATTACK): Primary | ICD-10-CM

## 2020-06-08 DIAGNOSIS — Z63.8 CAREGIVER ROLE STRAIN: ICD-10-CM

## 2020-06-08 DIAGNOSIS — N30.00 ACUTE CYSTITIS WITHOUT HEMATURIA: ICD-10-CM

## 2020-06-08 SDOH — SOCIAL STABILITY - SOCIAL INSECURITY: OTHER SPECIFIED PROBLEMS RELATED TO PRIMARY SUPPORT GROUP: Z63.8

## 2020-06-08 NOTE — PROGRESS NOTES
Yulia Nieves is a 78 y.o. female who presents with the following complaints:  Chief Complaint   Patient presents with   [facility] Follow Up     [facility]: 6/1/20 CVA       Subjective:    HPI:   Presents for transition of care visit following admission to [facility] from 6/1/2020 to 6/2/2020 for TIA, dizziness, and UTI. She has all of her medications on hand and is taking as prescribed. Discharged home on Keflex 1000 MG BID, reports good compliance, tolerating well, 1 day left of therapy. Reports she was asymptomatic for UTI, no symptoms on presentation in ED. Denies fever, chills, flank pain, blood in urine, pyuria, frequency. Reports intermittent dizziness, ongoing for years since her last TIA. Review of records reveals vestibular therapy was recommended by neuro during her recent admission. She is not interested in pursuing at present time. MRA/MRI of brain and neck during admission did not show any acute changes. Interrogation of loop recorder during admission did not reveal any arrhythmias. Cardiovascular risk analysis - LDL goal is under 70  hyperlipidemia  prior CVA/TIA or known carotid artery disease. Compliance with treatment thus far has been good. ROS: taking medications as instructed, no medication side effects noted, no TIA's, no chest pain on exertion, no dyspnea on exertion, no swelling of ankles, no orthopnea or paroxysmal nocturnal dyspnea, no palpitations, no intermittent claudication symptoms. 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 Reports he is up frequently at night with worsening dementia symptoms, now aggressive/angry at times. He has appt with neurology next week for further evaluation and management.      Pertinent PMH/FH/SH:  Past Medical History:   Diagnosis Date    Cancer ([facility] [address]     CLL (chronic lymphocytic leukemia) (HCC)     Coronary artery disease     Dyslipidemia     Neurological disorder     TIA (transient ischemic attack) 9/2015    TIA (transient ischemic attack)      Past Surgical History:   Procedure Laterality Date    BREAST SURGERY PROCEDURE UNLISTED      HX GYN  2004    [address]    HX OTHER SURGICAL  1998    ganglion cyst removed- wrist R    HX OTHER SURGICAL  1998    breast surgery    HX OTHER SURGICAL  1990    fibrodema (left breast)     HX OTHER SURGICAL  1999    ganglin cyst of the right hand     [place] INSERTION SUBQ CARDIAC RHYTHM MONITOR W/PRGRMG N/A 12/2/2019    LOOP RECORDER INSERT performed by Rob Danielle MD at [facility] CATH LAB     Family History   Problem Relation Age of Onset    Cancer Mother         renal, bladder     Social History     Socioeconomic History    Marital status:      Spouse name: Not on file    Number of children: Not on file    Years of education: Not on file    Highest education level: Not on file   Tobacco Use    Smoking status: Never Smoker    Smokeless tobacco: Never Used   Substance and Sexual Activity    Alcohol use: No     Frequency: Patient refused    Drug use: No    Sexual activity: Not Currently   Social History Narrative    ** Merged History Encounter **          Advanced Directives: N      Patient Active Problem List    Diagnosis    [facility] [address]    Seizure ([facility] [address]    Acute confusional state    TIA (transient ischemic attack)    High cholesterol    Stroke syndrome    Advance care planning    Vertigo    SOB (shortness of breath)    Dizziness       Nurse notes were reviewed and are correct  Review of Systems - negative except as listed above in the HPI    Lab Results   Component Value Date/Time    Cholesterol, total 132 06/02/2020 01:02 AM    HDL Cholesterol 51 06/02/2020 01:02 AM    LDL, calculated 56.4 06/02/2020 01:02 AM    VLDL, calculated 24.6 06/02/2020 01:02 AM    Triglyceride 123 06/02/2020 01:02 AM    CHOL/HDL Ratio 2.6 06/02/2020 01:02 AM     Lab Results   Component Value Date/Time    Sodium 140 06/02/2020 01:02 AM Potassium 3.6 06/02/2020 01:02 AM    Chloride 109 (H) 06/02/2020 01:02 AM    CO2 25 06/02/2020 01:02 AM    Anion gap 6 06/02/2020 01:02 AM    Glucose 94 06/02/2020 01:02 AM    BUN 18 06/02/2020 01:02 AM    Creatinine 0.78 06/02/2020 01:02 AM    BUN/Creatinine ratio 23 (H) 06/02/2020 01:02 AM    GFR est AA >60 06/02/2020 01:02 AM    GFR est non-AA >60 06/02/2020 01:02 AM    Calcium 8.3 (L) 06/02/2020 01:02 AM    Bilirubin, total 0.6 06/01/2020 01:11 PM    Alk. phosphatase 90 06/01/2020 01:11 PM    Protein, total 7.4 06/01/2020 01:11 PM    Albumin 4.2 06/01/2020 01:11 PM    Globulin 3.2 06/01/2020 01:11 PM    A-G Ratio 1.3 06/01/2020 01:11 PM    ALT (SGPT) 30 06/01/2020 01:11 PM         Objective:     Vitals:    06/08/20 0912   BP: 104/69   Pulse: 91   Resp: 18   Temp: 98.3 °F (36.8 °C)   TempSrc: Temporal   SpO2: 96%   Weight: 157 lb (71.2 kg)   Height: 5' 5\" (1.651 m)     Physical Examination: General appearance - alert, well appearing, and in no distress, oriented to person, place, and time, normal appearing weight and well hydrated  Mental status - normal mood, behavior, speech, dress, motor activity, and thought processes  Eyes - sclera anicteric  Neck - supple, no significant adenopathy  Chest - clear to auscultation, no wheezes, rales or rhonchi, symmetric air entry  Heart - normal rate, regular rhythm, normal S1, S2, no murmurs, rubs, clicks or gallops  Abdomen - soft, nondistended, no masses or organomegaly. Mild tenderness and ecchymosis noted over RLQ lovenox injection site. bowel sounds normal. No CVA tenderness. Neurological - alert, oriented, normal speech, no focal findings or movement disorder noted  Extremities - no pedal edema noted, intact peripheral pulses  Skin - normal coloration and turgor, no rashes    Assessment/ Plan:   Diagnoses and all orders for this visit:    1. TIA (transient ischemic attack)  Stable with no new symptoms  Continue secondary prevention (Plavix, ASA, statin)    2. Dizziness  Vestibular therapy recommended- patient does not want to pursue at this time due to her caregiving responsibilities for partner with dementia. She'll let me know if she changes her mind. Use caution when changing position    3. Acute cystitis without hematuria  Urine culture shows >100,000 colonies e coli, sensitive to prescribed antibx  Complete keflex as prescribed    4. Hyperlipidemia LDL goal <70  LDL at goal  TLC Diet:  -- Saturated fat <7% of calories, cholesterol <200 mg/day  -- Consider increased viscous (soluble) fiber (10-25 g/day) and plant stanols/sterols  (2g/day) as therapeutic options to enhance LDL lowering  Weight management  Increased physical activity. Continue atrovastatin     5. Caregiver role strain  Discussed current stressors associated with her caregiving role  Suggested counseling or support group- patient states she is getting good support from her sister who is a mental health professional  F/u with neurology is planned for her partner to address behavioral disturbances      I have discussed the diagnosis with the patient and the intended plan as seen in the above orders. The patient has received an after-visit summary and questions were answered concerning future plans. The patient verbalizes understanding. Medication Side Effects and Warnings were discussed with patient: yes  Patient Labs were reviewed and or requested: yes  Patient Past Records were reviewed and or requested: yes- release signed for DEXA from gyn, colonoscopy report from [id number] today    Patient Instructions        Dizziness: Care Instructions  Your Care Instructions  Dizziness is the feeling of unsteadiness or fuzziness in your head. It is different than having vertigo, which is a feeling that the room is spinning or that you are moving or falling. It is also different from lightheadedness, which is the feeling that you are about to faint. It can be hard to know what causes dizziness.  Some people feel dizzy when they have migraine headaches. Sometimes bouts of flu can make you feel dizzy. Some medical conditions, such as heart problems or high blood pressure, can make you feel dizzy. Many medicines can cause dizziness, including medicines for high blood pressure, pain, or anxiety. If a medicine causes your symptoms, your doctor may recommend that you stop or change the medicine. If it is a problem with your heart, you may need medicine to help your heart work better. If there is no clear reason for your symptoms, your doctor may suggest watching and waiting for a while to see if the dizziness goes away on its own. Follow-up care is a key part of your treatment and safety. Be sure to make and go to all appointments, and call your doctor if you are having problems. It's also a good idea to know your test results and keep a list of the medicines you take. How can you care for yourself at home? · If your doctor recommends or prescribes medicine, take it exactly as directed. Call your doctor if you think you are having a problem with your medicine. · Do not drive while you feel dizzy. · Try to prevent falls. Steps you can take include:  ? Using nonskid mats, adding grab bars near the tub, and using night-lights. ? Clearing your home so that walkways are free of anything you might trip on.  ? Letting family and friends know that you have been feeling dizzy. This will help them know how to help you. When should you call for help? [id number] anytime you think you may need emergency care. For example, call if:  · You passed out (lost consciousness). · You have dizziness along with symptoms of a heart attack. These may include:  ? Chest pain or pressure, or a strange feeling in the chest.  ? Sweating. ? Shortness of breath. ? Nausea or vomiting. ? Pain, pressure, or a strange feeling in the back, neck, jaw, or upper belly or in one or both shoulders or arms. ? Lightheadedness or sudden weakness.   ? A fast or irregular heartbeat. · You have symptoms of a stroke. These may include:  ? Sudden numbness, tingling, weakness, or loss of movement in your face, arm, or leg, especially on only one side of your body. ? Sudden vision changes. ? Sudden trouble speaking. ? Sudden confusion or trouble understanding simple statements. ? Sudden problems with walking or balance. ? A sudden, severe headache that is different from past headaches. Call your doctor now or seek immediate medical care if:  · You feel dizzy and have a fever, headache, or ringing in your ears. · You have new or increased nausea and vomiting. · Your dizziness does not go away or comes back. Watch closely for changes in your health, and be sure to contact your doctor if:  · You do not get better as expected. Where can you learn more? Go to http://arturo-mckinley.info/  Enter Q823 in the search box to learn more about \"Dizziness: Care Instructions. \"  Current as of: June 26, 2019               Content Version: 12.5  © 7419-2312 [employer]. Care instructions adapted under license by [employer] (which disclaims liability or warranty for this information). If you have questions about a medical condition or this instruction, always ask your healthcare professional. [place] [address] any warranty or liability for your use of this information. Urinary Tract Infection in Women: Care Instructions  Your Care Instructions     A urinary tract infection, or UTI, is a general term for an infection anywhere between the kidneys and the urethra (where urine comes out). Most UTIs are bladder infections. They often cause pain or burning when you urinate. UTIs are caused by bacteria and can be cured with antibiotics. Be sure to complete your treatment so that the infection goes away. Follow-up care is a key part of your treatment and safety.  Be sure to make and go to all appointments, and call your doctor if you are having problems. It's also a good idea to know your test results and keep a list of the medicines you take. How can you care for yourself at home? · Take your antibiotics as directed. Do not stop taking them just because you feel better. You need to take the full course of antibiotics. · Drink extra water and other fluids for the next day or two. This may help wash out the bacteria that are causing the infection. (If you have kidney, heart, or liver disease and have to limit fluids, talk with your doctor before you increase your fluid intake.)  · Avoid drinks that are carbonated or have caffeine. They can irritate the bladder. · Urinate often. Try to empty your bladder each time. · To relieve pain, take a hot bath or lay a heating pad set on low over your lower belly or genital area. Never go to sleep with a heating pad in place. To prevent UTIs  · Drink plenty of water each day. This helps you urinate often, which clears bacteria from your system. (If you have kidney, heart, or liver disease and have to limit fluids, talk with your doctor before you increase your fluid intake.)  · Urinate when you need to. · Urinate right after you have sex. · Change sanitary pads often. · Avoid douches, bubble baths, feminine hygiene sprays, and other feminine hygiene products that have deodorants. · After going to the bathroom, wipe from front to back. When should you call for help? Call your doctor now or seek immediate medical care if:  · Symptoms such as fever, chills, nausea, or vomiting get worse or appear for the first time. · You have new pain in your back just below your rib cage. This is called flank pain. · There is new blood or pus in your urine. · You have any problems with your antibiotic medicine. Watch closely for changes in your health, and be sure to contact your doctor if:  · You are not getting better after taking an antibiotic for 2 days.   · Your symptoms go away but then come back. Where can you learn more? Go to http://arturo-mckinley.info/  Enter I149 in the search box to learn more about \"Urinary Tract Infection in Women: Care Instructions. \"  Current as of: August 22, 2019               Content Version: 12.5  © 5813-9830 [employer]. Care instructions adapted under license by [employer] (which disclaims liability or warranty for this information). If you have questions about a medical condition or this instruction, always ask your healthcare professional. [place] [address] any warranty or liability for your use of this information. Transient Ischemic Attack: Care Instructions  Your Care Instructions     A transient ischemic attack (TIA) is when blood flow to a part of your brain is blocked for a short time. A TIA is like a stroke but usually lasts only a few minutes. A TIA does not cause lasting brain damage. Any vision problems, slurred speech, or other symptoms usually go away in 10 to 20 minutes. But they may last for up to 24 hours. TIAs are often warning signs of a stroke. Some people who have a TIA may have a stroke in the future. A stroke can cause symptoms like those of a TIA. But a stroke causes lasting damage to your brain. You can take steps to help prevent a stroke. One thing you can do is get early treatment. If you have other new symptoms, or if your symptoms do not get better, go back to the emergency room or call your doctor right away. Getting treatment right away may prevent long-term brain damage caused by a stroke. The doctor has checked you carefully, but problems can develop later. If you notice any problems or new symptoms, get medical treatment right away. Follow-up care is a key part of your treatment and safety. Be sure to make and go to all appointments, and call your doctor if you are having problems.  It's also a good idea to know your test results and keep a list of the medicines you take. How can you care for yourself at home? Medicines  · Be safe with medicines. Take your medicines exactly as prescribed. Call your doctor if you think you are having a problem with your medicine. · If you take a blood thinner, such as aspirin, be sure you get instructions about how to take your medicine safely. Blood thinners can cause serious bleeding problems. · Call your doctor if you are not able to take your medicines for any reason. · Do not take any over-the-counter medicines or herbal products without talking to your doctor first.  · If you take birth control pills or hormone therapy, talk to your doctor. Ask if these treatments are right for you. Lifestyle changes  · Do not smoke. If you need help quitting, talk to your doctor about stop-smoking programs and medicines. · Be active. If your doctor recommends it, get more exercise. Walking is a good choice. Bit by bit, increase the amount you walk every day. Try for at least 30 minutes on most days of the week. You also may want to swim, bike, or do other activities. · Eat heart-healthy foods. These include fruits, vegetables, high-fiber foods, lean meats, beans, peas, nuts, seeds, and soy products, and foods that are low in sodium, saturated fat, and trans fat. · Stay at a healthy weight. Lose weight if you need to. · Limit alcohol to 2 drinks a day for men and 1 drink a day for women. Staying healthy  · Manage other health problems such as diabetes, high blood pressure, and high cholesterol. · Get the flu vaccine every year. When should you call for help? [id number] anytime you think you may need emergency care. For example, call if:  · You have new or worse symptoms of a stroke. These may include:  ? Sudden numbness, tingling, weakness, or loss of movement in your face, arm, or leg, especially on only one side of your body. ? Sudden vision changes. ? Sudden trouble speaking.   ? Sudden confusion or trouble understanding simple statements. ? Sudden problems with walking or balance. ? A sudden, severe headache that is different from past headaches. Call 911 even if these symptoms go away in a few minutes. · You feel like you are having another TIA. Watch closely for changes in your health, and be sure to contact your doctor if you have any problems. Where can you learn more? Go to http://arturo-mckinley.info/  Enter I231 in the search box to learn more about \"Transient Ischemic Attack: Care Instructions. \"  Current as of: March 4, 2020               Content Version: 12.5  © 2734-0076 [employer]. Care instructions adapted under license by [employer] (which disclaims liability or warranty for this information). If you have questions about a medical condition or this instruction, always ask your healthcare professional. [address] any warranty or liability for your use of this information.             Abdoulaye CARLIN

## 2020-06-08 NOTE — PROGRESS NOTES
Chief Complaint   Patient presents with   [facility] Follow Up     [facility] ED: 6/1/20 CVA     1. Have you been to the ER, urgent care clinic since your last visit? Hospitalized since your last visit? Yes [facility] ED: 6/1/20 CVA    2. Have you seen or consulted any other health care providers outside of the [address] [place] [address] since your last visit? Include any pap smears or colon screening.  No

## 2020-06-08 NOTE — PATIENT INSTRUCTIONS
Dizziness: Care Instructions  Your Care Instructions  Dizziness is the feeling of unsteadiness or fuzziness in your head. It is different than having vertigo, which is a feeling that the room is spinning or that you are moving or falling. It is also different from lightheadedness, which is the feeling that you are about to faint. It can be hard to know what causes dizziness. Some people feel dizzy when they have migraine headaches. Sometimes bouts of flu can make you feel dizzy. Some medical conditions, such as heart problems or high blood pressure, can make you feel dizzy. Many medicines can cause dizziness, including medicines for high blood pressure, pain, or anxiety. If a medicine causes your symptoms, your doctor may recommend that you stop or change the medicine. If it is a problem with your heart, you may need medicine to help your heart work better. If there is no clear reason for your symptoms, your doctor may suggest watching and waiting for a while to see if the dizziness goes away on its own. Follow-up care is a key part of your treatment and safety. Be sure to make and go to all appointments, and call your doctor if you are having problems. It's also a good idea to know your test results and keep a list of the medicines you take. How can you care for yourself at home? · If your doctor recommends or prescribes medicine, take it exactly as directed. Call your doctor if you think you are having a problem with your medicine. · Do not drive while you feel dizzy. · Try to prevent falls. Steps you can take include:  ? Using nonskid mats, adding grab bars near the tub, and using night-lights. ? Clearing your home so that walkways are free of anything you might trip on.  ? Letting family and friends know that you have been feeling dizzy. This will help them know how to help you. When should you call for help? [id number] anytime you think you may need emergency care.  For example, call if:  · You passed out (lost consciousness). · You have dizziness along with symptoms of a heart attack. These may include:  ? Chest pain or pressure, or a strange feeling in the chest.  ? Sweating. ? Shortness of breath. ? Nausea or vomiting. ? Pain, pressure, or a strange feeling in the back, neck, jaw, or upper belly or in one or both shoulders or arms. ? Lightheadedness or sudden weakness. ? A fast or irregular heartbeat. · You have symptoms of a stroke. These may include:  ? Sudden numbness, tingling, weakness, or loss of movement in your face, arm, or leg, especially on only one side of your body. ? Sudden vision changes. ? Sudden trouble speaking. ? Sudden confusion or trouble understanding simple statements. ? Sudden problems with walking or balance. ? A sudden, severe headache that is different from past headaches. Call your doctor now or seek immediate medical care if:  · You feel dizzy and have a fever, headache, or ringing in your ears. · You have new or increased nausea and vomiting. · Your dizziness does not go away or comes back. Watch closely for changes in your health, and be sure to contact your doctor if:  · You do not get better as expected. Where can you learn more? Go to http://arturo-mckinley.info/  Enter Q823 in the search box to learn more about \"Dizziness: Care Instructions. \"  Current as of: June 26, 2019               Content Version: 12.5  © 9683-9588 [employer]. Care instructions adapted under license by [employer] (which disclaims liability or warranty for this information). If you have questions about a medical condition or this instruction, always ask your healthcare professional. [place] [address] any warranty or liability for your use of this information.          Urinary Tract Infection in Women: Care Instructions  Your Care Instructions     A urinary tract infection, or UTI, is a general term for an infection anywhere between the kidneys and the urethra (where urine comes out). Most UTIs are bladder infections. They often cause pain or burning when you urinate. UTIs are caused by bacteria and can be cured with antibiotics. Be sure to complete your treatment so that the infection goes away. Follow-up care is a key part of your treatment and safety. Be sure to make and go to all appointments, and call your doctor if you are having problems. It's also a good idea to know your test results and keep a list of the medicines you take. How can you care for yourself at home? · Take your antibiotics as directed. Do not stop taking them just because you feel better. You need to take the full course of antibiotics. · Drink extra water and other fluids for the next day or two. This may help wash out the bacteria that are causing the infection. (If you have kidney, heart, or liver disease and have to limit fluids, talk with your doctor before you increase your fluid intake.)  · Avoid drinks that are carbonated or have caffeine. They can irritate the bladder. · Urinate often. Try to empty your bladder each time. · To relieve pain, take a hot bath or lay a heating pad set on low over your lower belly or genital area. Never go to sleep with a heating pad in place. To prevent UTIs  · Drink plenty of water each day. This helps you urinate often, which clears bacteria from your system. (If you have kidney, heart, or liver disease and have to limit fluids, talk with your doctor before you increase your fluid intake.)  · Urinate when you need to. · Urinate right after you have sex. · Change sanitary pads often. · Avoid douches, bubble baths, feminine hygiene sprays, and other feminine hygiene products that have deodorants. · After going to the bathroom, wipe from front to back. When should you call for help?    Call your doctor now or seek immediate medical care if:  · Symptoms such as fever, chills, nausea, or vomiting get worse or appear for the first time. · You have new pain in your back just below your rib cage. This is called flank pain. · There is new blood or pus in your urine. · You have any problems with your antibiotic medicine. Watch closely for changes in your health, and be sure to contact your doctor if:  · You are not getting better after taking an antibiotic for 2 days. · Your symptoms go away but then come back. Where can you learn more? Go to http://arturo-mckinley.info/  Enter P422 in the search box to learn more about \"Urinary Tract Infection in Women: Care Instructions. \"  Current as of: August 22, 2019               Content Version: 12.5  © 7973-1451 [employer]. Care instructions adapted under license by "[employer] (which disclaims liability or warranty for this information). If you have questions about a medical condition or this instruction, always ask your healthcare professional. [place] [address] any warranty or liability for your use of this information. Transient Ischemic Attack: Care Instructions  Your Care Instructions     A transient ischemic attack (TIA) is when blood flow to a part of your brain is blocked for a short time. A TIA is like a stroke but usually lasts only a few minutes. A TIA does not cause lasting brain damage. Any vision problems, slurred speech, or other symptoms usually go away in 10 to 20 minutes. But they may last for up to 24 hours. TIAs are often warning signs of a stroke. Some people who have a TIA may have a stroke in the future. A stroke can cause symptoms like those of a TIA. But a stroke causes lasting damage to your brain. You can take steps to help prevent a stroke. One thing you can do is get early treatment. If you have other new symptoms, or if your symptoms do not get better, go back to the emergency room or call your doctor right away.  Getting treatment right away may prevent long-term brain damage caused by a stroke. The doctor has checked you carefully, but problems can develop later. If you notice any problems or new symptoms, get medical treatment right away. Follow-up care is a key part of your treatment and safety. Be sure to make and go to all appointments, and call your doctor if you are having problems. It's also a good idea to know your test results and keep a list of the medicines you take. How can you care for yourself at home? Medicines  · Be safe with medicines. Take your medicines exactly as prescribed. Call your doctor if you think you are having a problem with your medicine. · If you take a blood thinner, such as aspirin, be sure you get instructions about how to take your medicine safely. Blood thinners can cause serious bleeding problems. · Call your doctor if you are not able to take your medicines for any reason. · Do not take any over-the-counter medicines or herbal products without talking to your doctor first.  · If you take birth control pills or hormone therapy, talk to your doctor. Ask if these treatments are right for you. Lifestyle changes  · Do not smoke. If you need help quitting, talk to your doctor about stop-smoking programs and medicines. · Be active. If your doctor recommends it, get more exercise. Walking is a good choice. Bit by bit, increase the amount you walk every day. Try for at least 30 minutes on most days of the week. You also may want to swim, bike, or do other activities. · Eat heart-healthy foods. These include fruits, vegetables, high-fiber foods, lean meats, beans, peas, nuts, seeds, and soy products, and foods that are low in sodium, saturated fat, and trans fat. · Stay at a healthy weight. Lose weight if you need to. · Limit alcohol to 2 drinks a day for men and 1 drink a day for women. Staying healthy  · Manage other health problems such as diabetes, high blood pressure, and high cholesterol. · Get the flu vaccine every year.   When should you call for help? [id number] anytime you think you may need emergency care. For example, call if:  · You have new or worse symptoms of a stroke. These may include:  ? Sudden numbness, tingling, weakness, or loss of movement in your face, arm, or leg, especially on only one side of your body. ? Sudden vision changes. ? Sudden trouble speaking. ? Sudden confusion or trouble understanding simple statements. ? Sudden problems with walking or balance. ? A sudden, severe headache that is different from past headaches. Call 911 even if these symptoms go away in a few minutes. · You feel like you are having another TIA. Watch closely for changes in your health, and be sure to contact your doctor if you have any problems. Where can you learn more? Go to http://arturo-mckinley.info/  Enter I231 in the search box to learn more about \"Transient Ischemic Attack: Care Instructions. \"  Current as of: March 4, 2020               Content Version: 12.5  © 2006-2020 [employer]. Care instructions adapted under license by [employer] (which disclaims liability or warranty for this information). If you have questions about a medical condition or this instruction, always ask your healthcare professional. [place] [address] any warranty or liability for your use of this information.

## 2020-06-15 ENCOUNTER — VIRTUAL VISIT (OUTPATIENT)
Dept: CARDIOLOGY CLINIC | Age: 70
End: 2020-06-15

## 2020-06-15 DIAGNOSIS — Z95.818 STATUS POST PLACEMENT OF IMPLANTABLE LOOP RECORDER: ICD-10-CM

## 2020-06-15 DIAGNOSIS — R00.0 TACHYCARDIA: Primary | ICD-10-CM

## 2020-06-15 DIAGNOSIS — G45.9 TIA (TRANSIENT ISCHEMIC ATTACK): ICD-10-CM

## 2020-06-15 DIAGNOSIS — R56.9 SEIZURE (HCC): ICD-10-CM

## 2020-06-15 DIAGNOSIS — E78.00 HIGH CHOLESTEROL: ICD-10-CM

## 2020-06-15 RX ORDER — DILTIAZEM HYDROCHLORIDE 120 MG/1
120 CAPSULE, EXTENDED RELEASE ORAL DAILY
Qty: 30 CAP | Refills: 3 | Status: SHIPPED | OUTPATIENT
Start: 2020-06-15 | End: 2020-11-06

## 2020-06-15 NOTE — PROGRESS NOTES
VIRTUAL VISIT DOCUMENTATION     Pursuant to the emergency declaration under the [facility], [facility] waiver authority and the [employer] Act, this Virtual  Visit was conducted, with patient's consent, to reduce the patient's risk of exposure to COVID-19 and provide continuity of care for an established patient. Services were provided through an audio discussion virtually to substitute for in-person clinic visit. HISTORY OF PRESENTING ILLNESS      Tasneem Valenzuela is a 78 y.o. female whose clinic visit was performed via phone visit with Anice Pressman history of multiple TIAs/possible stroke who is referred for further evaluation of arrhythmia in the setting of her cryptogenic stroke. She had echocardiogram which demonstrated preserved LV function. She denies cardiac complaints. She underwent ILR injection. ILR had demonstrated several episodes of rapid tachycardia with HRs in the 150's. She appears to be asymptomatic during these and believes these occurred while she was outside working in the yard. We opted to continue monitoring for future recurrences to better define the underlying mechanism of her tachycardia. Since her last visit she was noted to have recurrent episode on May 14 at 7:35 AM with nearly identical heart rates.          ACTIVE PROBLEM LIST     Patient Active Problem List    Diagnosis Date Noted    CVA (cerebral vascular accident) ([facility] 06/01/2020    Seizure ([address] 10/15/2019    TIA (transient ischemic attack) 09/12/2018    Hyperlipidemia LDL goal <70 08/13/2018    Stroke syndrome 04/24/2018    Vertigo 06/13/2017    Dizziness 12/18/2015           PAST MEDICAL HISTORY     Past Medical History:   Diagnosis Date    Cancer ([address]     CLL (chronic lymphocytic leukemia) ([facility]     Coronary artery disease     Dyslipidemia     Neurological disorder     TIA (transient ischemic attack) 9/2015    TIA (transient ischemic attack)            PAST SURGICAL HISTORY     Past Surgical History:   Procedure Laterality Date    BREAST SURGERY PROCEDURE UNLISTED      HX GYN  2004    [address]    HX OTHER SURGICAL  1998    ganglion cyst removed- wrist R    HX OTHER SURGICAL  1998    breast surgery    HX OTHER SURGICAL  1990    fibrodema (left breast)     HX OTHER SURGICAL  1999    ganglin cyst of the right hand     VA INSERTION SUBQ CARDIAC RHYTHM MONITOR W/PRGRMG N/A 12/2/2019    LOOP RECORDER INSERT performed by Etienne Colon MD at [facility]          ALLERGIES     Allergies   Allergen Reactions    Ampicillin Hives    Bacitracin Other (comments)    Bactrim [Sulfamethoprim] Hives          FAMILY HISTORY     Family History   Problem Relation Age of Onset    Cancer Mother         renal, bladder    negative for cardiac disease       SOCIAL HISTORY     Social History     Socioeconomic History    Marital status:      Spouse name: Not on file    Number of children: Not on file    Years of education: Not on file    Highest education level: Not on file   Tobacco Use    Smoking status: Never Smoker    Smokeless tobacco: Never Used   Substance and Sexual Activity    Alcohol use: No     Frequency: Patient refused    Drug use: No    Sexual activity: Not Currently   Social History Narrative    ** Merged History Encounter **              MEDICATIONS     Current Outpatient Medications   Medication Sig    dilTIAZem ER (DILACOR XR) 120 mg capsule Take 1 Cap by mouth daily.  clopidogreL (Plavix) 75 mg tab Take 1 Tab by mouth nightly.  aspirin 81 mg chewable tablet Take 1 Tab by mouth daily.  atorvastatin (LIPITOR) 40 mg tablet Take 40 mg by mouth nightly. No current facility-administered medications for this visit. I have reviewed the nurses notes, vitals, problem list, allergy list, medical history, family, social history and medications.        REVIEW OF SYMPTOMS      General: Pt denies excessive weight gain or loss. Pt is able to conduct ADL's  HEENT: Denies blurred vision, headaches, hearing loss, epistaxis and difficulty swallowing. Respiratory: Denies cough, congestion, shortness of breath, SERRANO, wheezing or stridor. Cardiovascular: Denies precordial pain, palpitations, edema or PND  Gastrointestinal: Denies poor appetite, indigestion, abdominal pain or blood in stool  Genitourinary: Denies hematuria, dysuria, increased urinary frequency  Musculoskeletal: Denies joint pain or swelling from muscles or joints  Neurologic: Denies tremor, paresthesias, headache, or sensory motor disturbance  Psychiatric: Denies confusion, insomnia, depression  Integumentray: Denies rash, itching or ulcers. Hematologic: Denies easy bruising, bleeding         DIAGNOSTIC DATA                     LABORATORY DATA      Lab Results   Component Value Date/Time    WBC 9.5 06/02/2020 01:02 AM    HGB 13.4 06/02/2020 01:02 AM    HCT 39.7 06/02/2020 01:02 AM    PLATELET 464 94/48/2330 01:02 AM    MCV 86.5 06/02/2020 01:02 AM      Lab Results   Component Value Date/Time    Sodium 140 06/02/2020 01:02 AM    Potassium 3.6 06/02/2020 01:02 AM    Chloride 109 (H) 06/02/2020 01:02 AM    CO2 25 06/02/2020 01:02 AM    Anion gap 6 06/02/2020 01:02 AM    Glucose 94 06/02/2020 01:02 AM    BUN 18 06/02/2020 01:02 AM    Creatinine 0.78 06/02/2020 01:02 AM    BUN/Creatinine ratio 23 (H) 06/02/2020 01:02 AM    GFR est AA >60 06/02/2020 01:02 AM    GFR est non-AA >60 06/02/2020 01:02 AM    Calcium 8.3 (L) 06/02/2020 01:02 AM    Bilirubin, total 0.6 06/01/2020 01:11 PM    Alk. phosphatase 90 06/01/2020 01:11 PM    Protein, total 7.4 06/01/2020 01:11 PM    Albumin 4.2 06/01/2020 01:11 PM    Globulin 3.2 06/01/2020 01:11 PM    A-G Ratio 1.3 06/01/2020 01:11 PM    ALT (SGPT) 30 06/01/2020 01:11 PM           ASSESSMENT      1. Cryptogenic stroke   A. ILR  2. CAD  3. Dyslipidemia        ICD-10-CM ICD-9-CM    1. Tachycardia R00.0 785.0    2.  TIA (transient ischemic attack) G45.9 435.9    3. Seizure ([address] R56.9 780.39    4. Status post placement of implantable loop recorder Z95.818 V45.09    5. High cholesterol E78.00 272.0      Orders Placed This Encounter    dilTIAZem ER (DILACOR XR) 120 mg capsule     Sig: Take 1 Cap by mouth daily. Dispense:  30 Cap     Refill:  3        PLAN     Plan for a trial of diltiazem 120 mg daily in an attempt to lengthen the cycle length of her tachycardia to better elucidate the underlying mechanism of her arrhythmia and to guide whether anticoagulation is warranted. Should she continue to have tachycardia will consider escalation of her diltiazem dosing to 240 mg daily. Should she be found to have atrial fibrillation/atrial flutter we will plan to initiate anticoagulation with Eliquis 5 mg twice daily. We discussed the expected course, resolution and complications of the diagnosis(es) in detail. Medication risks, benefits, costs, interactions, and alternatives were discussed as indicated. I advised her to contact the office if her condition worsens, changes or fails to improve as anticipated. She expressed understanding with the diagnosis(es) and plan     FOLLOW-UP     1 month      Patient was made aware and verbalized understanding that an appointment will be scheduled for them for a virtual visit and/or office visit within the above time frame. Patient understanding his/her responsibility to call and change time/date if he/she so chooses. Thank you, Bianka Quiñones MD for allowing me to participate in the care of this extraordinarily pleasant female. Please do not hesitate to contact me for further questions/concerns.          Jorge A Jacques MD  Cardiac Electrophysiology / Cardiology    [address]  [address] [place] [address]  [phone] / [phone] Fax   [phone] / [phone] Fax        Greater than 20 minutes was spent in direct audio patient care, planning and chart review. This visit was conducted using [employer] telemedicine or [employer] Call services.

## 2020-06-17 ENCOUNTER — PATIENT OUTREACH (OUTPATIENT)
Dept: INTERNAL MEDICINE CLINIC | Age: 70
End: 2020-06-17

## 2020-06-17 NOTE — PROGRESS NOTES
Called patient to follow up  Reports she is doing well, has not had anymore episodes  Reports she is currently at dermatologist appt  CTN will continue to follow    Goals      Attend follow up appointments on schedule        06/05/20  · Will attend follow up appt with PCP scheduled 6/8 06/17/20  · Attended       Understands red flags post discharge.         06/05/20  · Reviewed BE FAST acronym

## 2020-06-22 LAB — COLONOSCOPY, EXTERNAL: NORMAL

## 2020-06-23 RX ORDER — ATORVASTATIN CALCIUM 40 MG/1
40 TABLET, FILM COATED ORAL
Qty: 90 TAB | Refills: 1 | Status: SHIPPED | OUTPATIENT
Start: 2020-06-23 | End: 2021-01-12 | Stop reason: SDUPTHER

## 2020-07-22 ENCOUNTER — OFFICE VISIT (OUTPATIENT)
Dept: CARDIOLOGY CLINIC | Age: 70
End: 2020-07-22

## 2020-07-22 VITALS
RESPIRATION RATE: 18 BRPM | WEIGHT: 160 LBS | SYSTOLIC BLOOD PRESSURE: 140 MMHG | BODY MASS INDEX: 26.63 KG/M2 | OXYGEN SATURATION: 98 % | DIASTOLIC BLOOD PRESSURE: 70 MMHG | HEART RATE: 62 BPM

## 2020-07-22 DIAGNOSIS — R56.9 SEIZURE (HCC): ICD-10-CM

## 2020-07-22 DIAGNOSIS — R00.0 TACHYCARDIA: Primary | ICD-10-CM

## 2020-07-22 DIAGNOSIS — Z95.818 STATUS POST PLACEMENT OF IMPLANTABLE LOOP RECORDER: ICD-10-CM

## 2020-07-22 DIAGNOSIS — G45.9 TIA (TRANSIENT ISCHEMIC ATTACK): ICD-10-CM

## 2020-07-22 NOTE — PROGRESS NOTES
HISTORY OF PRESENTING ILLNESS      Tasneem Cota is a 78 y.o. female history of multiple TIAs/possible stroke who is referred for further evaluation of arrhythmia in the setting of her cryptogenic stroke. She had echocardiogram which demonstrated preserved LV function. She denies cardiac complaints. She underwent ILR injection. ILR had demonstrated several episodes of rapid tachycardia with HRs in the 150's. She appears to be asymptomatic during these and believes these occurred while she was outside working in the yard. We opted to continue monitoring for future recurrences to better define the underlying mechanism of her tachycardia. Since her last visit she was noted to have recurrent episode on May 14 at 7:35 AM with nearly identical heart rates. Last visit we had planned for trial of diltiazem 120 mg daily in an attempt to lengthen the cycle length of her tachycardia to better elucidate the underlying mechanism of her arrhythmia and to guide whether anticoagulation is warranted. Should she continue to have tachycardia will consider escalation of her diltiazem dosing to 240 mg daily. Should she be found to have atrial fibrillation/atrial flutter we will plan to initiate anticoagulation with Eliquis 5 mg twice daily. ILR interrogation demonstrates better control of rates.         PAST MEDICAL HISTORY     Past Medical History:   Diagnosis Date    [facility] ([facility]     CLL (chronic lymphocytic leukemia) ([facility]     Coronary artery disease     Dyslipidemia     Neurological disorder     TIA (transient ischemic attack) 9/2015    TIA (transient ischemic attack)            PAST SURGICAL HISTORY     Past Surgical History:   Procedure Laterality Date    BREAST SURGERY PROCEDURE UNLISTED      HX GYN  2004    [address]    HX OTHER SURGICAL  1998    ganglion cyst removed- wrist R    HX OTHER SURGICAL  1998    breast surgery    HX OTHER SURGICAL  1990    fibrodema (left breast)     HX OTHER SURGICAL  1999 ganglin cyst of the right hand     OR INSERTION SUBQ CARDIAC RHYTHM MONITOR W/PRGRMG N/A 12/2/2019    LOOP RECORDER INSERT performed by Dontae Bhandari MD at [address] [facility]          ALLERGIES     Allergies   Allergen Reactions    Ampicillin Hives    Bacitracin Other (comments)    Bactrim [Sulfamethoprim] Hives          FAMILY HISTORY     Family History   Problem Relation Age of Onset   [facility] Cancer Mother         renal, bladder    negative for cardiac disease       SOCIAL HISTORY     Social History     Socioeconomic History    Marital status:      Spouse name: Not on file    Number of children: Not on file    Years of education: Not on file    Highest education level: Not on file   Tobacco Use    Smoking status: Never Smoker    Smokeless tobacco: Never Used   Substance and Sexual Activity    Alcohol use: No     Frequency: Patient refused    Drug use: No    Sexual activity: Not Currently   Social History Narrative    ** Merged History Encounter **              MEDICATIONS     Current Outpatient Medications   Medication Sig    atorvastatin (LIPITOR) 40 mg tablet Take 1 Tab by mouth nightly.  dilTIAZem ER (DILACOR XR) 120 mg capsule Take 1 Cap by mouth daily.  clopidogreL (Plavix) 75 mg tab Take 1 Tab by mouth nightly.  aspirin 81 mg chewable tablet Take 1 Tab by mouth daily. No current facility-administered medications for this visit. I have reviewed the nurses notes, vitals, problem list, allergy list, medical history, family, social history and medications. REVIEW OF SYMPTOMS      General: Pt denies excessive weight gain or loss. Pt is able to conduct ADL's  HEENT: Denies blurred vision, headaches, hearing loss, epistaxis and difficulty swallowing. Respiratory: Denies cough, congestion, shortness of breath, SERRANO, wheezing or stridor.   Cardiovascular: Denies precordial pain, palpitations, edema or PND  Gastrointestinal: Denies poor appetite, indigestion, abdominal pain or blood in stool  Genitourinary: Denies hematuria, dysuria, increased urinary frequency  Musculoskeletal: Denies joint pain or swelling from muscles or joints  Neurologic: Denies tremor, paresthesias, headache, or sensory motor disturbance  Psychiatric: Denies confusion, insomnia, depression  Integumentray: Denies rash, itching or ulcers. Hematologic: Denies easy bruising, bleeding       PHYSICAL EXAMINATION      Vitals: see vitals section  General: Well developed, in no acute distress. HEENT: No jaundice, oral mucosa moist, no oral ulcers  Neck: Supple, no stiffness, no lymphadenopathy, supple  Heart:  Normal S1/S2 negative S3 or S4. Regular, no murmur, gallop or rub, no jugular venous distention  Respiratory: Clear bilaterally x 4, no wheezing or rales  Abdomen:   Soft, non-tender, bowel sounds are active. Extremities:  No edema, normal cap refill, no cyanosis. Musculoskeletal: No clubbing, no deformities  Neuro: A&Ox3, speech clear, gait stable, cooperative, no focal neurologic deficits  Skin: Skin color is normal. No rashes or lesions. Non diaphoretic, moist.  Vascular: 2+ pulses symmetric in all extremities       DIAGNOSTIC DATA      EKG:        LABORATORY DATA      Lab Results   Component Value Date/Time    WBC 9.5 06/02/2020 01:02 AM    HGB 13.4 06/02/2020 01:02 AM    HCT 39.7 06/02/2020 01:02 AM    PLATELET 340 08/53/4550 01:02 AM    MCV 86.5 06/02/2020 01:02 AM      Lab Results   Component Value Date/Time    Sodium 140 06/02/2020 01:02 AM    Potassium 3.6 06/02/2020 01:02 AM    Chloride 109 (H) 06/02/2020 01:02 AM    CO2 25 06/02/2020 01:02 AM    Anion gap 6 06/02/2020 01:02 AM    Glucose 94 06/02/2020 01:02 AM    BUN 18 06/02/2020 01:02 AM    Creatinine 0.78 06/02/2020 01:02 AM    BUN/Creatinine ratio 23 (H) 06/02/2020 01:02 AM    GFR est AA >60 06/02/2020 01:02 AM    GFR est non-AA >60 06/02/2020 01:02 AM    Calcium 8.3 (L) 06/02/2020 01:02 AM    Bilirubin, total 0.6 06/01/2020 01:11 PM    Alk. phosphatase 90 06/01/2020 01:11 PM    Protein, total 7.4 06/01/2020 01:11 PM    Albumin 4.2 06/01/2020 01:11 PM    Globulin 3.2 06/01/2020 01:11 PM    A-G Ratio 1.3 06/01/2020 01:11 PM    ALT (SGPT) 30 06/01/2020 01:11 PM           ASSESSMENT      1. Cryptogenic stroke              A. ILR  2. CAD  3. Dyslipidemia          PLAN     Continue current medications and monitor ILR to help better discern the underlying mechanism of the tachycardia. ICD-10-CM ICD-9-CM    1. Tachycardia  R00.0 785.0    2. TIA (transient ischemic attack)  G45.9 435.9    3. Seizure ([address]  R56.9 780.39    4. Status post placement of implantable loop recorder  Z95.818 V45.09      No orders of the defined types were placed in this encounter. FOLLOW-UP       Thank you, Benedicto Avitia MD for allowing me to participate in the care of this extraordinarily pleasant female. Please do not hesitate to contact me for further questions/concerns.          Vasiliy Wylie MD  Cardiac Electrophysiology / Cardiology    [address], [facility], [address] [place] [address]  [phone] / [phone] Fax   [phone] / [phone] Fax

## 2020-07-22 NOTE — PROGRESS NOTES
Visit Vitals  /70 (BP 1 Location: Left arm, BP Patient Position: Sitting)   Pulse 62   Resp 18   Wt 160 lb (72.6 kg)   SpO2 98%   BMI 26.63 kg/m²     Pt denies CP, palpitations, dizziness, edema. Pt feels good one Cardizem.

## 2020-08-12 ENCOUNTER — VIRTUAL VISIT (OUTPATIENT)
Dept: FAMILY MEDICINE CLINIC | Age: 70
End: 2020-08-12
Payer: MEDICARE

## 2020-08-12 DIAGNOSIS — F41.9 ANXIETY: ICD-10-CM

## 2020-08-12 DIAGNOSIS — N39.0 URINARY TRACT INFECTION WITHOUT HEMATURIA, SITE UNSPECIFIED: Primary | ICD-10-CM

## 2020-08-12 PROCEDURE — 99214 OFFICE O/P EST MOD 30 MIN: CPT | Performed by: FAMILY MEDICINE

## 2020-08-12 PROCEDURE — 3017F COLORECTAL CA SCREEN DOC REV: CPT | Performed by: FAMILY MEDICINE

## 2020-08-12 PROCEDURE — 1090F PRES/ABSN URINE INCON ASSESS: CPT | Performed by: FAMILY MEDICINE

## 2020-08-12 PROCEDURE — G8399 PT W/DXA RESULTS DOCUMENT: HCPCS | Performed by: FAMILY MEDICINE

## 2020-08-12 PROCEDURE — G8427 DOCREV CUR MEDS BY ELIG CLIN: HCPCS | Performed by: FAMILY MEDICINE

## 2020-08-12 PROCEDURE — 1101F PT FALLS ASSESS-DOCD LE1/YR: CPT | Performed by: FAMILY MEDICINE

## 2020-08-12 PROCEDURE — G8510 SCR DEP NEG, NO PLAN REQD: HCPCS | Performed by: FAMILY MEDICINE

## 2020-08-12 RX ORDER — CIPROFLOXACIN 250 MG/1
250 TABLET, FILM COATED ORAL EVERY 12 HOURS
Qty: 20 TAB | Refills: 0 | Status: SHIPPED | OUTPATIENT
Start: 2020-08-12 | End: 2020-08-22

## 2020-08-12 RX ORDER — ALPRAZOLAM 0.25 MG/1
0.25 TABLET ORAL
Qty: 30 TAB | Refills: 0 | Status: SHIPPED | OUTPATIENT
Start: 2020-08-12 | End: 2020-10-09 | Stop reason: SDUPTHER

## 2020-08-12 NOTE — PROGRESS NOTES
Patient here today for 2 reasons. First she thinks she has urinary tract infection she had a one-week history of dysuria foul-smelling odor and not feeling well overall. She has no abdominal pain no fever chills nausea vomiting or diarrhea. 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 She is very upset about this and would like something what to take as she needs for the anxiety she is experiencing. Consent: Karoline Torres, who was seen by synchronous (real-time) audio-video technology, and/or her healthcare decision maker, is aware that this patient-initiated, Telehealth encounter on 8/12/2020 is a billable service, with coverage as determined by her insurance carrier. She is aware that she may receive a bill and has provided verbal consent to proceed: YES-Consent obtained within past 12 months        712  Subjective:   Karoline Torres is a 78 y.o. female who was seen for No chief complaint on file. Prior to Admission medications    Medication Sig Start Date End Date Taking? Authorizing Provider   ciprofloxacin HCl (CIPRO) 250 mg tablet Take 1 Tab by mouth every twelve (12) hours for 10 days. 8/12/20 8/22/20 Yes Brynn Goldman MD   ALPRAZolam Katy ) 0.25 mg tablet Take 1 Tab by mouth two (2) times daily as needed for Anxiety. Max Daily Amount: 0.5 mg. 8/12/20  Yes Brynn Goldman MD   atorvastatin (LIPITOR) 40 mg tablet Take 1 Tab by mouth nightly. 6/23/20   Brynn Goldman MD   dilTIAZem ER (DILACOR XR) 120 mg capsule Take 1 Cap by mouth daily. 6/15/20   Angelique Moreira MD   clopidogreL (Plavix) 75 mg tab Take 1 Tab by mouth nightly. 4/22/20   Nilton WALTERS DO   aspirin 81 mg chewable tablet Take 1 Tab by mouth daily.  10/16/19   Stacey Barnes MD     Allergies   Allergen Reactions    Ampicillin Hives    Bacitracin Other (comments)    Bactrim [Sulfamethoprim] Hives     Patient Active Problem List    Diagnosis    CVA (cerebral vascular accident) ([address]    Seizure ([address]    TIA (transient ischemic attack)    Hyperlipidemia LDL goal <70    Stroke syndrome    Vertigo    Dizziness     Patient Active Problem List   Diagnosis Code    Vertigo R42    Stroke syndrome [id number]    Hyperlipidemia LDL goal <70 E78.5    TIA (transient ischemic attack) G45.9    Dizziness R42    Seizure ([address] R56.9    CVA (cerebral vascular accident) ([facility] I63.9       ROS    Objective:   Vital Signs: (As obtained by patient/caregiver at home)  There were no vitals taken for this visit. [INSTRUCTIONS:  \"[x]\" Indicates a positive item  \"[]\" Indicates a negative item  -- DELETE ALL ITEMS NOT EXAMINED]    Constitutional: [x] Appears well-developed and well-nourished [x] No apparent distress      [] Abnormal -     Mental status: [x] Alert and awake  [x] Oriented to person/place/time [x] Able to follow commands    [] Abnormal -     Eyes:   EOM    [x]  Normal    [] Abnormal -   Sclera  [x]  Normal    [] Abnormal -          Discharge [x]  None visible   [] Abnormal -     HENT: [x] Normocephalic, atraumatic  [] Abnormal -   [x] Mouth/Throat: Mucous membranes are moist    External Ears [x] Normal  [] Abnormal -    Neck: [x] No visualized mass [] Abnormal -     Pulmonary/Chest: [x] Respiratory effort normal   [x] No visualized signs of difficulty breathing or respiratory distress        [] Abnormal -        Neurological:        [x] No Facial Asymmetry (Cranial nerve 7 motor function) (limited exam due to video visit)          [x] No gaze palsy        [] Abnormal -          Skin:        [x] No significant exanthematous lesions or discoloration noted on facial skin         [] Abnormal -            Psychiatric:       [x] Normal Affect [] Abnormal -        [x] No Hallucinations    Other pertinent observable physical exam findings:-              Assessment & Plan:   Diagnoses and all orders for this visit:    1.  Urinary tract infection without hematuria, site unspecified  -     ciprofloxacin HCl (CIPRO) 250 mg tablet; Take 1 Tab by mouth every twelve (12) hours for 10 days.  -Add Cipro for the urinary tract infection encourage patient to increase p.o. intake and call if she has any increased symptoms. 2. Anxiety  -     ALPRAZolam (XANAX) 0.25 mg tablet; Take 1 Tab by mouth two (2) times daily as needed for Anxiety. Max Daily Amount: 0.5 mg.  -We will add Xanax to use as needed. 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 Advised patient on use medication as needed. We discussed the expected course, resolution and complications of the diagnosis(es) in detail. Medication risks, benefits, costs, interactions, and alternatives were discussed as indicated. I advised her to contact the office if her condition worsens, changes or fails to improve as anticipated. She expressed understanding with the diagnosis(es) and plan. Tremaine Andersen is a 78 y.o. female being evaluated by a video visit encounter for concerns as above. A caregiver was present when appropriate. Due to this being a TeleHealth encounter (During [place] [address] public health emergency), evaluation of the following organ systems was limited: Vitals/Constitutional/EENT/Resp/CV/GI//MS/Neuro/Skin/Heme-Lymph-Imm. Pursuant to the emergency declaration under the [facility], [facility] waiver authority and the [employer] and [employer] General Act, this Virtual  Visit was conducted, with patient's (and/or legal guardian's) consent, to reduce the patient's risk of exposure to COVID-19 and provide necessary medical care. Services were provided through a video synchronous discussion virtually to substitute for in-person clinic visit.    Patient and provider were located at their individual homes.         Marycruz Drummond MD

## 2020-09-02 LAB — SARS-COV-2, NAA: NEGATIVE

## 2020-10-06 ENCOUNTER — OFFICE VISIT (OUTPATIENT)
Dept: CARDIOLOGY CLINIC | Age: 70
End: 2020-10-06
Payer: COMMERCIAL

## 2020-10-06 DIAGNOSIS — Z95.818 STATUS POST PLACEMENT OF IMPLANTABLE LOOP RECORDER: Primary | ICD-10-CM

## 2020-10-06 PROCEDURE — 93291 INTERROG DEV EVAL SCRMS IP: CPT

## 2020-10-09 DIAGNOSIS — F41.9 ANXIETY: ICD-10-CM

## 2020-10-09 RX ORDER — ALPRAZOLAM 0.25 MG/1
0.25 TABLET ORAL
Qty: 30 TAB | Refills: 0 | Status: SHIPPED | OUTPATIENT
Start: 2020-10-09 | End: 2022-09-27 | Stop reason: ALTCHOICE

## 2020-11-02 RX ORDER — CHLORHEXIDINE GLUCONATE 1.2 MG/ML
15 RINSE ORAL EVERY 12 HOURS
COMMUNITY
End: 2020-11-02 | Stop reason: SDUPTHER

## 2020-11-02 RX ORDER — CHLORHEXIDINE GLUCONATE 1.2 MG/ML
15 RINSE ORAL EVERY 12 HOURS
Qty: 473 ML | Refills: 2 | Status: SHIPPED | OUTPATIENT
Start: 2020-11-02

## 2020-11-02 NOTE — TELEPHONE ENCOUNTER
Pt is requesting refill on Peridex  0.12% solution states her dentist was filling this but he has retired & she needs refill Pharmacy correct in computer.

## 2020-11-06 RX ORDER — DILTIAZEM HYDROCHLORIDE 120 MG/1
CAPSULE, EXTENDED RELEASE ORAL
Qty: 90 CAP | Refills: 0 | Status: SHIPPED | OUTPATIENT
Start: 2020-11-06 | End: 2021-01-12 | Stop reason: SDUPTHER

## 2020-11-06 NOTE — TELEPHONE ENCOUNTER
Per Dr. Jj Gilliland VO:  HSB:4/75/3270  Future Appointments   Date Time Provider Marsha Vargasi   1/6/2021  2:20 PM JYOTI Jolly   7/21/2021 10:00 AM Durga Marr MD [facility] AMB     Requested Prescriptions     Pending Prescriptions Disp Refills    dilTIAZem ER (TIAZAC) 120 mg capsule [Pharmacy Med Name: dilTIAZem HCl ER Beads 120 MG Oral Capsule Extended Release 24 Hour] 30 Cap 0     Sig: Take 1 capsule by mouth once daily

## 2020-11-09 NOTE — PROGRESS NOTES
3.5 year old with H/O autism, ran out of Rispoerdol x 3 days now.   BCR.   Pt is biting himself and thrashing in stroller. Accessed pt chart to assist primary RN. 2045: RN recvd call from [facility] they would like to test pt for COVID at this time. 2050:  I followed up with FP to see what the rationale for testing the patient; MD advised due to pt having nausea and vomiting for several days they would like to test.

## 2020-11-10 DIAGNOSIS — M25.511 ACUTE PAIN OF RIGHT SHOULDER: ICD-10-CM

## 2020-11-10 RX ORDER — DICLOFENAC SODIUM 10 MG/G
4 GEL TOPICAL 4 TIMES DAILY
Qty: 100 G | Refills: 1 | Status: SHIPPED | OUTPATIENT
Start: 2020-11-10

## 2020-11-12 ENCOUNTER — TELEPHONE (OUTPATIENT)
Dept: CARDIOLOGY CLINIC | Age: 70
End: 2020-11-12

## 2020-11-12 NOTE — TELEPHONE ENCOUNTER
Meena Gil MD  You; Dariel Bill NP 19 minutes ago (4:55 PM)       Sounds good      Will continue to monitor ILR for any episodes of tachycardia.

## 2020-12-23 ENCOUNTER — OFFICE VISIT (OUTPATIENT)
Dept: FAMILY MEDICINE CLINIC | Age: 70
End: 2020-12-23
Payer: MEDICARE

## 2020-12-23 VITALS
HEART RATE: 93 BPM | RESPIRATION RATE: 18 BRPM | SYSTOLIC BLOOD PRESSURE: 113 MMHG | BODY MASS INDEX: 27.16 KG/M2 | WEIGHT: 163 LBS | HEIGHT: 65 IN | TEMPERATURE: 98.6 F | DIASTOLIC BLOOD PRESSURE: 69 MMHG | OXYGEN SATURATION: 97 %

## 2020-12-23 DIAGNOSIS — N39.0 URINARY TRACT INFECTION WITHOUT HEMATURIA, SITE UNSPECIFIED: Primary | ICD-10-CM

## 2020-12-23 DIAGNOSIS — R10.9 FLANK PAIN: ICD-10-CM

## 2020-12-23 LAB
BILIRUB UR QL STRIP: NEGATIVE
GLUCOSE UR-MCNC: NEGATIVE MG/DL
KETONES P FAST UR STRIP-MCNC: NEGATIVE MG/DL
PH UR STRIP: 5 [PH] (ref 4.6–8)
PROT UR QL STRIP: NEGATIVE
SP GR UR STRIP: 1.02 (ref 1–1.03)
UA UROBILINOGEN AMB POC: NORMAL (ref 0.2–1)
URINALYSIS CLARITY POC: CLEAR
URINALYSIS COLOR POC: YELLOW
URINE BLOOD POC: NEGATIVE
URINE LEUKOCYTES POC: NORMAL
URINE NITRITES POC: NEGATIVE

## 2020-12-23 PROCEDURE — 1090F PRES/ABSN URINE INCON ASSESS: CPT | Performed by: NURSE PRACTITIONER

## 2020-12-23 PROCEDURE — 1101F PT FALLS ASSESS-DOCD LE1/YR: CPT | Performed by: NURSE PRACTITIONER

## 2020-12-23 PROCEDURE — G8427 DOCREV CUR MEDS BY ELIG CLIN: HCPCS | Performed by: NURSE PRACTITIONER

## 2020-12-23 PROCEDURE — G8536 NO DOC ELDER MAL SCRN: HCPCS | Performed by: NURSE PRACTITIONER

## 2020-12-23 PROCEDURE — G8419 CALC BMI OUT NRM PARAM NOF/U: HCPCS | Performed by: NURSE PRACTITIONER

## 2020-12-23 PROCEDURE — G8432 DEP SCR NOT DOC, RNG: HCPCS | Performed by: NURSE PRACTITIONER

## 2020-12-23 PROCEDURE — G8399 PT W/DXA RESULTS DOCUMENT: HCPCS | Performed by: NURSE PRACTITIONER

## 2020-12-23 PROCEDURE — 81003 URINALYSIS AUTO W/O SCOPE: CPT | Performed by: NURSE PRACTITIONER

## 2020-12-23 PROCEDURE — 3017F COLORECTAL CA SCREEN DOC REV: CPT | Performed by: NURSE PRACTITIONER

## 2020-12-23 PROCEDURE — G0463 HOSPITAL OUTPT CLINIC VISIT: HCPCS | Performed by: NURSE PRACTITIONER

## 2020-12-23 PROCEDURE — 99212 OFFICE O/P EST SF 10 MIN: CPT | Performed by: NURSE PRACTITIONER

## 2020-12-23 RX ORDER — NITROFURANTOIN 25; 75 MG/1; MG/1
CAPSULE ORAL
COMMUNITY
End: 2021-03-26 | Stop reason: ALTCHOICE

## 2020-12-23 NOTE — PROGRESS NOTES
Chief Complaint   Patient presents with    UTI     Patient in office today for f/u on uti. Was seen at [facility] 2 wks ago- was dx with uti, prescribed cipro initially. Sx returned after completing abx so was placed on macrobid on Monday. Flank pain is still noted. Denies any dysuria, frequency, urgency. Denies any hematuria. Macrobid was prescribed for 10 day course. Denies any fever or chills. Denies any other concerns at this time. Chief Complaint   Patient presents with    UTI     she is a 69y.o. year old female who presents for evalution. Reviewed PmHx, RxHx, FmHx, SocHx, AllgHx and updated and dated in the chart. Review of Systems - negative except as listed above in the HPI    Objective:     Vitals:    12/23/20 1321   BP: 113/69   Pulse: 93   Resp: 18   Temp: 98.6 °F (37 °C)   TempSrc: Temporal   SpO2: 97%   Weight: 163 lb (73.9 kg)   Height: 5' 5\" (1.651 m)     Physical Examination: General appearance - alert, well appearing, and in no distress  Chest - clear to auscultation, no wheezes, rales or rhonchi, symmetric air entry  Heart - normal rate, regular rhythm, normal S1, S2, no murmurs    Assessment/ Plan:   Diagnoses and all orders for this visit:    1. Urinary tract infection without hematuria, site unspecified / 2. Flank pain  -     AMB POC URINALYSIS DIP STICK AUTO W/O MICRO  -     CULTURE, URINE; Future  Continue macrobid as prescribed. Push fluids. Will notify results of culture and deviate plan based on findings. Follow up if sx persist or worsen to retest urine. I have discussed the diagnosis with the patient and the intended plan as seen in the above orders. The patient has received an after-visit summary and questions were answered concerning future plans.      Medication Side Effects and Warnings were discussed with patient: yes  Patient Labs were reviewed and or requested: yes  Patient Past Records were reviewed and or requested  yes  Patient / Caregiver Understanding of treatment plan was verbalized during office visit YES    IVONNE Harris    There are no Patient Instructions on file for this visit.

## 2020-12-23 NOTE — PROGRESS NOTES
Chief Complaint   Patient presents with    UTI       1. Have you been to the ER, urgent care clinic since your last visit? Hospitalized since your last visit? No    2. Have you seen or consulted any other health care providers outside of the [address] [place] [address] since your last visit? Include any pap smears or colon screening.  No

## 2020-12-25 LAB
BACTERIA SPEC CULT: NORMAL
SERVICE CMNT-IMP: NORMAL

## 2021-01-12 RX ORDER — CLOPIDOGREL BISULFATE 75 MG/1
75 TABLET ORAL
Qty: 90 TAB | Refills: 1 | Status: SHIPPED | OUTPATIENT
Start: 2021-01-12 | End: 2021-08-19

## 2021-01-12 RX ORDER — DILTIAZEM HYDROCHLORIDE 120 MG/1
CAPSULE, EXTENDED RELEASE ORAL
Qty: 90 CAP | Refills: 1 | Status: SHIPPED | OUTPATIENT
Start: 2021-01-12 | End: 2021-08-19

## 2021-01-12 RX ORDER — ATORVASTATIN CALCIUM 40 MG/1
40 TABLET, FILM COATED ORAL
Qty: 90 TAB | Refills: 1 | Status: SHIPPED | OUTPATIENT
Start: 2021-01-12 | End: 2021-08-19

## 2021-03-08 ENCOUNTER — OFFICE VISIT (OUTPATIENT)
Dept: CARDIOLOGY CLINIC | Age: 71
End: 2021-03-08
Payer: MEDICARE

## 2021-03-08 DIAGNOSIS — Z95.818 STATUS POST PLACEMENT OF IMPLANTABLE LOOP RECORDER: Primary | ICD-10-CM

## 2021-03-08 PROCEDURE — 93298 REM INTERROG DEV EVAL SCRMS: CPT | Performed by: INTERNAL MEDICINE

## 2021-03-26 ENCOUNTER — VIRTUAL VISIT (OUTPATIENT)
Dept: INTERNAL MEDICINE CLINIC | Age: 71
End: 2021-03-26
Payer: MEDICARE

## 2021-03-26 DIAGNOSIS — R21 RASH: Primary | ICD-10-CM

## 2021-03-26 PROCEDURE — G8432 DEP SCR NOT DOC, RNG: HCPCS | Performed by: PHYSICIAN ASSISTANT

## 2021-03-26 PROCEDURE — G8536 NO DOC ELDER MAL SCRN: HCPCS | Performed by: PHYSICIAN ASSISTANT

## 2021-03-26 PROCEDURE — 1101F PT FALLS ASSESS-DOCD LE1/YR: CPT | Performed by: PHYSICIAN ASSISTANT

## 2021-03-26 PROCEDURE — G8399 PT W/DXA RESULTS DOCUMENT: HCPCS | Performed by: PHYSICIAN ASSISTANT

## 2021-03-26 PROCEDURE — 99213 OFFICE O/P EST LOW 20 MIN: CPT | Performed by: PHYSICIAN ASSISTANT

## 2021-03-26 PROCEDURE — 3017F COLORECTAL CA SCREEN DOC REV: CPT | Performed by: PHYSICIAN ASSISTANT

## 2021-03-26 PROCEDURE — G8419 CALC BMI OUT NRM PARAM NOF/U: HCPCS | Performed by: PHYSICIAN ASSISTANT

## 2021-03-26 PROCEDURE — 1090F PRES/ABSN URINE INCON ASSESS: CPT | Performed by: PHYSICIAN ASSISTANT

## 2021-03-26 PROCEDURE — G8427 DOCREV CUR MEDS BY ELIG CLIN: HCPCS | Performed by: PHYSICIAN ASSISTANT

## 2021-03-26 PROCEDURE — G0463 HOSPITAL OUTPT CLINIC VISIT: HCPCS | Performed by: PHYSICIAN ASSISTANT

## 2021-03-26 RX ORDER — VALACYCLOVIR HYDROCHLORIDE 500 MG/1
TABLET, FILM COATED ORAL
COMMUNITY
Start: 2021-02-16 | End: 2021-07-21 | Stop reason: SDUPTHER

## 2021-03-26 RX ORDER — TRIAMCINOLONE ACETONIDE 1 MG/G
CREAM TOPICAL 2 TIMES DAILY
Qty: 15 G | Refills: 0 | Status: SHIPPED | OUTPATIENT
Start: 2021-03-26 | End: 2021-07-26

## 2021-03-26 NOTE — PROGRESS NOTES
Jer Bailey is a 69 y.o. female who was seen by synchronous (real-time) audio-video technology on 3/26/2021 for Rash        Assessment & Plan:   Diagnoses and all orders for this visit:    1. Rash  -     triamcinolone acetonide (KENALOG) 0.1 % topical cream; Apply  to affected area two (2) times a day. use thin layer    Topical steroid cream has been sent to the pharmacy. Based on patient's current symptoms oral steroid is not required. Advised the patient to follow-up with Alexandria Lira or myself if area is not getting better or seems to worsen. I spent at least 20 minutes on this visit with this established patient. 712  Subjective:   Patient presents for a rash of the lower right leg. She reports she has 3 small areas about 6 inches from her ankle. She reports the area is itchy and at times burns a little. She states that after giving it some thought she think it may be contact dermatitis due to [employer] pods. 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 She reports that she noticed the use of [employer] laundry pods for the laundry. She reports that today the area actually looks better. She has been using Benadryl cream on the rash. Prior to Admission medications    Medication Sig Start Date End Date Taking? Authorizing Provider   triamcinolone acetonide (KENALOG) 0.1 % topical cream Apply  to affected area two (2) times a day. use thin layer 3/26/21  Yes Dinorah Carreno PA-C   atorvastatin (LIPITOR) 40 mg tablet Take 1 Tab by mouth nightly. 1/12/21  Yes Brant Rudolph NP   dilTIAZem ER [facility]) 120 mg capsule Take 1 capsule by mouth once daily 1/12/21  Yes Brant Rudolph NP   clopidogreL (Plavix) 75 mg tab Take 1 Tab by mouth nightly. 1/12/21  Yes Brant Rudolph NP   diclofenac (VOLTAREN) 1 % gel Apply 4 g to affected area four (4) times daily.  shoulder 11/10/20  Yes Tatiana Deluca MD   chlorhexidine (Peridex) 0.12 % solution 15 mL by Swish and Spit route every twelve (12) hours. 11/2/20  Yes Winsome Bui MD   ALPRAZolam Danney Jose F) 0.25 mg tablet Take 1 Tab by mouth two (2) times daily as needed for Anxiety. Max Daily Amount: 0.5 mg. 10/9/20  Yes Winsome Bui MD   aspirin 81 mg chewable tablet Take 1 Tab by mouth daily. 10/16/19  Yes Stacey Barnes MD   valACYclovir (VALTREX) 500 mg tablet TAKE 1 TABLET BY MOUTH TWICE DAILY FOR 7 DAYS 2/16/21   Provider, Historical   nitrofurantoin, macrocrystal-monohydrate, (Macrobid) 100 mg capsule Macrobid 100 mg capsule   Take 1 capsule every 12 hours by oral route for 10 days. 3/26/21  Provider, Historical     Patient Active Problem List    Diagnosis Date Noted    [facility]A [facility]cerebral vascular accident) ([facility] 06/01/2020    Seizure ([facility] 10/15/2019    TIA (transient ischemic attack) 09/12/2018    Hyperlipidemia LDL goal <70 08/13/2018    Stroke syndrome 04/24/2018    Vertigo 06/13/2017    Dizziness 12/18/2015     Current Outpatient Medications   Medication Sig Dispense Refill    triamcinolone acetonide (KENALOG) 0.1 % topical cream Apply  to affected area two (2) times a day. use thin layer 15 g 0    atorvastatin (LIPITOR) 40 mg tablet Take 1 Tab by mouth nightly. 90 Tab 1    dilTIAZem ER (TIAZAC) 120 mg capsule Take 1 capsule by mouth once daily 90 Cap 1    clopidogreL (Plavix) 75 mg tab Take 1 Tab by mouth nightly. 90 Tab 1    diclofenac (VOLTAREN) 1 % gel Apply 4 g to affected area four (4) times daily. shoulder 100 g 1    chlorhexidine (Peridex) 0.12 % solution 15 mL by Swish and Spit route every twelve (12) hours. 473 mL 2    ALPRAZolam (XANAX) 0.25 mg tablet Take 1 Tab by mouth two (2) times daily as needed for Anxiety. Max Daily Amount: 0.5 mg. 30 Tab 0    aspirin 81 mg chewable tablet Take 1 Tab by mouth daily.  30 Tab 0    valACYclovir (VALTREX) 500 mg tablet TAKE 1 TABLET BY MOUTH TWICE DAILY FOR 7 DAYS       Allergies   Allergen Reactions    Ampicillin Hives    Bacitracin Other (comments)  Bactrim [Sulfamethoprim] Hives       ROS  See above  Objective:   No flowsheet data found. General: alert, cooperative, no distress   Mental  status: normal mood, behavior, speech, dress, motor activity, and thought processes, able to follow commands   HENT: NCAT   Neck: no visualized mass   Resp: no respiratory distress   Neuro: no gross deficits   Skin:  Right lower legpatient describes 3 slightly erythemic areas that is about the size of a pencil eraser of the lower right leg   Psychiatric: normal affect, consistent with stated mood, no evidence of hallucinations     Additional exam findings: We discussed the expected course, resolution and complications of the diagnosis(es) in detail. Medication risks, benefits, costs, interactions, and alternatives were discussed as indicated. I advised her to contact the office if her condition worsens, changes or fails to improve as anticipated. She expressed understanding with the diagnosis(es) and plan. Viviana Retana, was evaluated through a synchronous (real-time) audio-video encounter. The patient (or guardian if applicable) is aware that this is a billable service. Verbal consent to proceed has been obtained within the past 12 months. The visit was conducted pursuant to the emergency declaration under the [facility], [address] [place] [address] authority and the [employer] General Act. Patient identification was verified, and a caregiver was present when appropriate. The patient was located in a state where the provider was credentialed to provide care.     Rupali Carreno PA-C

## 2021-05-12 ENCOUNTER — TELEPHONE (OUTPATIENT)
Dept: CARDIOLOGY CLINIC | Age: 71
End: 2021-05-12

## 2021-05-12 NOTE — TELEPHONE ENCOUNTER
Patient states she is receiving a new monitor from [employer] since it stopped working. Patient was told it will take 5 days.     Phone: [phone]

## 2021-05-13 NOTE — TELEPHONE ENCOUNTER
Spoke to pt about her [employer] box. She stated that there was an issue & [employer] is sending her a new monitor. Once she gets it she said she is going to call [employer] & they will help her set it up.

## 2021-06-10 ENCOUNTER — OFFICE VISIT (OUTPATIENT)
Dept: CARDIOLOGY CLINIC | Age: 71
End: 2021-06-10
Payer: MEDICARE

## 2021-06-10 DIAGNOSIS — Z95.818 STATUS POST PLACEMENT OF IMPLANTABLE LOOP RECORDER: Primary | ICD-10-CM

## 2021-06-10 PROCEDURE — 93291 INTERROG DEV EVAL SCRMS IP: CPT

## 2021-06-10 PROCEDURE — 93298 REM INTERROG DEV EVAL SCRMS: CPT | Performed by: INTERNAL MEDICINE

## 2021-06-15 ENCOUNTER — OFFICE VISIT (OUTPATIENT)
Dept: FAMILY MEDICINE CLINIC | Age: 71
End: 2021-06-15
Payer: MEDICARE

## 2021-06-15 VITALS
SYSTOLIC BLOOD PRESSURE: 132 MMHG | HEART RATE: 81 BPM | DIASTOLIC BLOOD PRESSURE: 84 MMHG | TEMPERATURE: 98.6 F | WEIGHT: 164 LBS | RESPIRATION RATE: 20 BRPM | BODY MASS INDEX: 27.32 KG/M2 | HEIGHT: 65 IN | OXYGEN SATURATION: 95 %

## 2021-06-15 DIAGNOSIS — J06.9 UPPER RESPIRATORY TRACT INFECTION, UNSPECIFIED TYPE: Primary | ICD-10-CM

## 2021-06-15 DIAGNOSIS — R56.9 SEIZURE (HCC): ICD-10-CM

## 2021-06-15 PROCEDURE — 1090F PRES/ABSN URINE INCON ASSESS: CPT | Performed by: FAMILY MEDICINE

## 2021-06-15 PROCEDURE — G8419 CALC BMI OUT NRM PARAM NOF/U: HCPCS | Performed by: FAMILY MEDICINE

## 2021-06-15 PROCEDURE — G8510 SCR DEP NEG, NO PLAN REQD: HCPCS | Performed by: FAMILY MEDICINE

## 2021-06-15 PROCEDURE — G8399 PT W/DXA RESULTS DOCUMENT: HCPCS | Performed by: FAMILY MEDICINE

## 2021-06-15 PROCEDURE — G0463 HOSPITAL OUTPT CLINIC VISIT: HCPCS | Performed by: FAMILY MEDICINE

## 2021-06-15 PROCEDURE — G8536 NO DOC ELDER MAL SCRN: HCPCS | Performed by: FAMILY MEDICINE

## 2021-06-15 PROCEDURE — 1101F PT FALLS ASSESS-DOCD LE1/YR: CPT | Performed by: FAMILY MEDICINE

## 2021-06-15 PROCEDURE — G8427 DOCREV CUR MEDS BY ELIG CLIN: HCPCS | Performed by: FAMILY MEDICINE

## 2021-06-15 PROCEDURE — 3017F COLORECTAL CA SCREEN DOC REV: CPT | Performed by: FAMILY MEDICINE

## 2021-06-15 PROCEDURE — 99213 OFFICE O/P EST LOW 20 MIN: CPT | Performed by: FAMILY MEDICINE

## 2021-06-15 RX ORDER — AZITHROMYCIN 250 MG/1
TABLET, FILM COATED ORAL
Qty: 6 TABLET | Refills: 0 | Status: SHIPPED
Start: 2021-06-15 | End: 2021-07-21

## 2021-06-15 RX ORDER — LORATADINE 10 MG/1
10 TABLET ORAL DAILY
Qty: 30 TABLET | Refills: 11 | Status: SHIPPED | OUTPATIENT
Start: 2021-06-15 | End: 2021-07-21 | Stop reason: SDUPTHER

## 2021-06-15 RX ORDER — BENZONATATE 100 MG/1
100 CAPSULE ORAL
Qty: 60 CAPSULE | Refills: 1 | Status: SHIPPED | OUTPATIENT
Start: 2021-06-15 | End: 2021-06-22

## 2021-06-15 NOTE — PROGRESS NOTES
Patient here for cough. Chest muscles sore from cough. Cough started 3 days ago. Voice hoarse. 1. Have you been to the ER, urgent care clinic since your last visit? Hospitalized since your last visit? No    2. Have you seen or consulted any other health care providers outside of the [address] [place] [address] since your last visit? Include any pap smears or colon screening. No          Chief Complaint   Patient presents with    Cough     throat and chest muscles hurt from cough     She is a 69 y.o. female who presents for evalution. Reviewed PmHx, RxHx, FmHx, SocHx, AllgHx and updated and dated in the chart. Patient Active Problem List    Diagnosis    CV[facility]cerebral vascular accident) [facility]    Seizure ([facility]    TIA (transient ischemic attack)    Hyperlipidemia LDL goal <70    Stroke syndrome    Vertigo    Dizziness       Review of Systems - negative except as listed above in the HPI    Objective:     Vitals:    06/15/21 0949   BP: 132/84   Pulse: 81   Resp: 20   Temp: 98.6 °F (37 °C)   SpO2: 95%   Weight: 164 lb (74.4 kg)   Height: 5' 5\" (1.651 m)         Assessment/ Plan:   Diagnoses and all orders for this visit:    1. Upper respiratory tract infection, unspecified type  -     loratadine (Claritin) 10 mg tablet; Take 1 Tablet by mouth daily for 360 days. -     benzonatate (Tessalon Perles) 100 mg capsule; Take 1 Capsule by mouth three (3) times daily as needed for Cough for up to 7 days. -     azithromycin (ZITHROMAX) 250 mg tablet; Take two tablets today then one tablet daily  -add rx    2. [facility]ure ([facility]  -stable             I have discussed the diagnosis with the patient and the intended plan as seen in the above orders. The patient understands and agrees with the plan. The patient has received an after-visit summary and questions were answered concerning future plans.      Medication Side Effects and Warnings were discussed with patient  Patient Labs were reviewed and or requested:  Patient Past Records were reviewed and or requested    Vipul Sy M.D. There are no Patient Instructions on file for this visit.

## 2021-07-02 ENCOUNTER — TELEPHONE (OUTPATIENT)
Dept: FAMILY MEDICINE CLINIC | Age: 71
End: 2021-07-02

## 2021-07-02 NOTE — TELEPHONE ENCOUNTER
Received a transferred call from [facility]. Pt reports that around 8 am she was doing laundry and was walking from her laundry room back to her house and she was wearing flip flops. She slipped on the wet grass and fell backwards hitting her head on the grass and dirt. She is currently sitting down and has a mild HA and \"wooziness\". Advised pt that she should go to the ED for eval and asked if there was anyone who could take her? Pt stated that her family was working and her friends were not available.  Pt will call the EMS to take her to the ED for eval.

## 2021-07-13 ENCOUNTER — OFFICE VISIT (OUTPATIENT)
Dept: CARDIOLOGY CLINIC | Age: 71
End: 2021-07-13
Payer: MEDICARE

## 2021-07-13 DIAGNOSIS — Z95.818 STATUS POST PLACEMENT OF IMPLANTABLE LOOP RECORDER: Primary | ICD-10-CM

## 2021-07-13 PROCEDURE — 93298 REM INTERROG DEV EVAL SCRMS: CPT | Performed by: INTERNAL MEDICINE

## 2021-07-21 ENCOUNTER — OFFICE VISIT (OUTPATIENT)
Dept: FAMILY MEDICINE CLINIC | Age: 71
End: 2021-07-21
Payer: MEDICARE

## 2021-07-21 VITALS
SYSTOLIC BLOOD PRESSURE: 138 MMHG | BODY MASS INDEX: 27.82 KG/M2 | WEIGHT: 167 LBS | OXYGEN SATURATION: 96 % | TEMPERATURE: 97.8 F | HEART RATE: 75 BPM | RESPIRATION RATE: 16 BRPM | HEIGHT: 65 IN | DIASTOLIC BLOOD PRESSURE: 84 MMHG

## 2021-07-21 DIAGNOSIS — E78.5 HYPERLIPIDEMIA LDL GOAL <70: ICD-10-CM

## 2021-07-21 DIAGNOSIS — Z00.00 ROUTINE GENERAL MEDICAL EXAMINATION AT A HEALTH CARE FACILITY: Primary | ICD-10-CM

## 2021-07-21 DIAGNOSIS — R56.9 SEIZURE (HCC): ICD-10-CM

## 2021-07-21 DIAGNOSIS — I63.9 CEREBROVASCULAR ACCIDENT (CVA), UNSPECIFIED MECHANISM (HCC): ICD-10-CM

## 2021-07-21 DIAGNOSIS — J06.9 UPPER RESPIRATORY TRACT INFECTION, UNSPECIFIED TYPE: ICD-10-CM

## 2021-07-21 DIAGNOSIS — G47.00 INSOMNIA, UNSPECIFIED TYPE: ICD-10-CM

## 2021-07-21 LAB
ALBUMIN SERPL-MCNC: 4.1 G/DL (ref 3.5–5)
ALBUMIN/GLOB SERPL: 1.5 {RATIO} (ref 1.1–2.2)
ALP SERPL-CCNC: 106 U/L (ref 45–117)
ALT SERPL-CCNC: 29 U/L (ref 12–78)
ANION GAP SERPL CALC-SCNC: 10 MMOL/L (ref 5–15)
AST SERPL-CCNC: 12 U/L (ref 15–37)
BASOPHILS # BLD: 0.1 K/UL (ref 0–0.1)
BASOPHILS NFR BLD: 1 % (ref 0–1)
BILIRUB SERPL-MCNC: 0.6 MG/DL (ref 0.2–1)
BUN SERPL-MCNC: 18 MG/DL (ref 6–20)
BUN/CREAT SERPL: 24 (ref 12–20)
CALCIUM SERPL-MCNC: 9.4 MG/DL (ref 8.5–10.1)
CHLORIDE SERPL-SCNC: 106 MMOL/L (ref 97–108)
CHOLEST SERPL-MCNC: 170 MG/DL
CO2 SERPL-SCNC: 27 MMOL/L (ref 21–32)
CREAT SERPL-MCNC: 0.74 MG/DL (ref 0.55–1.02)
DIFFERENTIAL METHOD BLD: ABNORMAL
EOSINOPHIL # BLD: 0.2 K/UL (ref 0–0.4)
EOSINOPHIL NFR BLD: 2 % (ref 0–7)
ERYTHROCYTE [DISTWIDTH] IN BLOOD BY AUTOMATED COUNT: 13.4 % (ref 11.5–14.5)
EST. AVERAGE GLUCOSE BLD GHB EST-MCNC: 120 MG/DL
GLOBULIN SER CALC-MCNC: 2.8 G/DL (ref 2–4)
GLUCOSE SERPL-MCNC: 97 MG/DL (ref 65–100)
HBA1C MFR BLD: 5.8 % (ref 4–5.6)
HCT VFR BLD AUTO: 41 % (ref 35–47)
HDLC SERPL-MCNC: 58 MG/DL
HDLC SERPL: 2.9 {RATIO} (ref 0–5)
HGB BLD-MCNC: 13.4 G/DL (ref 11.5–16)
IMM GRANULOCYTES # BLD AUTO: 0 K/UL (ref 0–0.04)
IMM GRANULOCYTES NFR BLD AUTO: 0 % (ref 0–0.5)
LDLC SERPL CALC-MCNC: 82.8 MG/DL (ref 0–100)
LYMPHOCYTES # BLD: 4.6 K/UL (ref 0.8–3.5)
LYMPHOCYTES NFR BLD: 45 % (ref 12–49)
MCH RBC QN AUTO: 29.8 PG (ref 26–34)
MCHC RBC AUTO-ENTMCNC: 32.7 G/DL (ref 30–36.5)
MCV RBC AUTO: 91.1 FL (ref 80–99)
MONOCYTES # BLD: 0.5 K/UL (ref 0–1)
MONOCYTES NFR BLD: 5 % (ref 5–13)
NEUTS SEG # BLD: 4.8 K/UL (ref 1.8–8)
NEUTS SEG NFR BLD: 47 % (ref 32–75)
NRBC # BLD: 0 K/UL (ref 0–0.01)
NRBC BLD-RTO: 0 PER 100 WBC
PLATELET # BLD AUTO: 298 K/UL (ref 150–400)
PMV BLD AUTO: 9.7 FL (ref 8.9–12.9)
POTASSIUM SERPL-SCNC: 4.5 MMOL/L (ref 3.5–5.1)
PROT SERPL-MCNC: 6.9 G/DL (ref 6.4–8.2)
RBC # BLD AUTO: 4.5 M/UL (ref 3.8–5.2)
SODIUM SERPL-SCNC: 143 MMOL/L (ref 136–145)
TRIGL SERPL-MCNC: 146 MG/DL (ref ?–150)
TSH SERPL DL<=0.05 MIU/L-ACNC: 1.6 UIU/ML (ref 0.36–3.74)
VLDLC SERPL CALC-MCNC: 29.2 MG/DL
WBC # BLD AUTO: 10.1 K/UL (ref 3.6–11)

## 2021-07-21 PROCEDURE — 99214 OFFICE O/P EST MOD 30 MIN: CPT | Performed by: FAMILY MEDICINE

## 2021-07-21 PROCEDURE — G0463 HOSPITAL OUTPT CLINIC VISIT: HCPCS | Performed by: FAMILY MEDICINE

## 2021-07-21 PROCEDURE — G8510 SCR DEP NEG, NO PLAN REQD: HCPCS | Performed by: FAMILY MEDICINE

## 2021-07-21 PROCEDURE — 1101F PT FALLS ASSESS-DOCD LE1/YR: CPT | Performed by: FAMILY MEDICINE

## 2021-07-21 PROCEDURE — G8427 DOCREV CUR MEDS BY ELIG CLIN: HCPCS | Performed by: FAMILY MEDICINE

## 2021-07-21 PROCEDURE — G8536 NO DOC ELDER MAL SCRN: HCPCS | Performed by: FAMILY MEDICINE

## 2021-07-21 PROCEDURE — G0439 PPPS, SUBSEQ VISIT: HCPCS | Performed by: FAMILY MEDICINE

## 2021-07-21 PROCEDURE — 3017F COLORECTAL CA SCREEN DOC REV: CPT | Performed by: FAMILY MEDICINE

## 2021-07-21 PROCEDURE — G8419 CALC BMI OUT NRM PARAM NOF/U: HCPCS | Performed by: FAMILY MEDICINE

## 2021-07-21 PROCEDURE — 1090F PRES/ABSN URINE INCON ASSESS: CPT | Performed by: FAMILY MEDICINE

## 2021-07-21 PROCEDURE — G8399 PT W/DXA RESULTS DOCUMENT: HCPCS | Performed by: FAMILY MEDICINE

## 2021-07-21 RX ORDER — LORATADINE 10 MG/1
10 TABLET ORAL DAILY
Qty: 30 TABLET | Refills: 11 | Status: SHIPPED | OUTPATIENT
Start: 2021-07-21 | End: 2022-07-16

## 2021-07-21 RX ORDER — VALACYCLOVIR HYDROCHLORIDE 500 MG/1
TABLET, FILM COATED ORAL
Qty: 60 TABLET | Refills: 2 | Status: SHIPPED | OUTPATIENT
Start: 2021-07-21

## 2021-07-21 RX ORDER — TRAZODONE HYDROCHLORIDE 50 MG/1
50 TABLET ORAL
Qty: 30 TABLET | Refills: 1 | Status: SHIPPED | OUTPATIENT
Start: 2021-07-21 | End: 2022-07-28 | Stop reason: SDUPTHER

## 2021-07-21 NOTE — PROGRESS NOTES
Chief Complaint   Patient presents with    Annual Wellness Visit    Labs     Fasting today    Sleep Problem     Wake up and can't get back to sleep    Skin Problem     Right leg     1. Have you been to the ER, urgent care clinic since your last visit? Hospitalized since your last visit? Yes Where: [facility] ED: 7/5/21 Fall- hit head: no injury    2. Have you seen or consulted any other health care providers outside of the [address] since your last visit? Include any pap smears or colon screening. Yes Where: [facility]        Medicare Wellness Exam:    Chief Complaint   Patient presents with   [facility] Wellness Visit    Labs     Fasting today    Sleep Problem     Wake up and can't get back to sleep    Skin Problem     Right leg     she is a 69y.o. year old female who presents for evaluation for their Medicare Wellness Visit. Joycie Solo is completed and assessed=yes  Depression Screen is completed and assessed=yes  Medication list reviewed and adjusted for accuracy=yes  Immunizations reviewed and updated=yes  Health/Preventative Screenings reviewed and updated=yes  ADL Functions reviewed=yes    Patient Active Problem List    Diagnosis    [facility]cere[facility] accident) [facility]    Seizure ([facility]    TIA (transient ischemic attack)    Hyperlipidemia LDL goal <70    Stroke syndrome    Vertigo    Dizziness       Reviewed PmHx, RxHx, FmHx, SocHx, AllgHx and updated and dated in the chart. Review of Systems - negative except as listed above in the HPI    Objective:     Vitals:    07/21/21 0906   BP: 138/84   Pulse: 75   Resp: 16   Temp: 97.8 °F (36.6 °C)   TempSrc: Oral   SpO2: 96%   Weight: 167 lb (75.8 kg)   Height: 5' 5\" (1.651 m)       Assessment/ Plan:   Diagnoses and all orders for this visit:    1. Routine general medical examination at a health care facility  -     loratadine (Claritin) 10 mg tablet;  Take 1 Tablet by mouth daily for 360 days.  -     LIPID PANEL; Future  -     TSH 3RD GENERATION; Future  -     HEMOGLOBIN A1C WITH EAG; Future  -     METABOLIC PANEL, COMPREHENSIVE; Future  -     CBC WITH AUTOMATED DIFF; Future  -see below    2. Upper respiratory tract infection, unspecified type  -     loratadine (Claritin) 10 mg tablet; Take 1 Tablet by mouth daily for 360 days. 3. Hyperlipidemia LDL goal <70  -     LIPID PANEL; Future  -     METABOLIC PANEL, COMPREHENSIVE; Future    4. Cerebrovascular accident (CVA), unspecified mechanism ([facility]  -     LIPID PANEL; Future  -     METABOLIC PANEL, COMPREHENSIVE; Future    5. [facility]  -stable    6. Insomnia, unspecified type  -     traZODone (DESYREL) 50 mg tablet; Take 1 Tablet by mouth nightly. Indications: insomnia associated with depression  -add new rx    Other orders  -     valACYclovir (VALTREX) 500 mg tablet; TAKE 1 TABLET BY MOUTH TWICE DAILY FOR 7 DAYS         -Pain evaluation performed in office  -Cognitive Screen performed in office  -Depression Screen, Fall risks (by up and go test)  and ADL functionality were addressed  -Medication list updated and reviewed for any changes   -A comprehensive review of medical issues and a plan was formulated  -End of life planning was addressed with pt   -Health Screenings for preventions were addressed and a plan was formulated  -Shingles Vaccine was recommended  -Discussed with patient cancer risk factors and appropriate screenings for age  -Patient evaluated for colonoscopy and referred if needed per screeing criteria  -Labs from previous visits were discussed with patient   -Discussed with patient diet and exercise and formulated a plan as needed  -An Advanced care plan was developed with the patient.  -Alcohol screening performed and was negative    -    I have discussed the diagnosis with the patient and the intended plan as seen in the above orders. The patient understands and agrees with the plan.  The patient has received an after-visit summary and questions were answered concerning future plans. Medication Side Effects and Warnings were discussed with patien  Patient Labs were reviewed and or requested  Patient Past Records were reviewed and or requested    There are no Patient Instructions on file for this visit.       Aguilar Cohn M.D.

## 2021-07-22 NOTE — PROGRESS NOTES
Thank you for your visit,  and I hope that we met your expectations! Let us hope 2021 is a great year for you! For 2021 and beyond we are offering Virtual appointments for you, giving you more convenient access to your provider. ..just ask the  when you call our office for your next appointment. We also are offering E-Visits. You can find the link for an E-Visit in your [facility] mike and this is a visit thru messaging and attached to your medical record. If you have a simple concern you can click on this link and answer few questions, by the end of the day your concerns will have been addressed. After reviewing your labs, they are within normal limits for your age. Keep working hard on diet and taking your medications that are prescribed. If you have any acute care needs and are having trouble getting an appointment. .. please send me a   [facility] message or have the  send me a message by calling [phone]. Have a blessed day and don't forget to be kind  to others! Luther Clark M.D.   Good Help to Those in Need  \"You may be whatever you resolve to be\"

## 2021-07-23 NOTE — PROGRESS NOTES
HISTORY OF PRESENT ILLNESS      Tasneem Chowdhury is a 69 y.o. female history of multiple TIAs/possible stroke s/p ILR for cryptogenic stroke. She had echocardiogram which demonstrated preserved LV function. Previously, her ILR demonstrated several episodes of rapid tachycardia with HRs in the 150's during which she was asymptomatic. Last visit we had planned for trial of diltiazem 120 mg daily in an attempt to lengthen the cycle length of her tachycardia to better elucidate the underlying mechanism of her arrhythmia and to guide whether anticoagulation is warranted. Her ILR interrogations have failed to show recurrent tachycardia. She denies cardiac complaints.       PAST MEDICAL HISTORY     Past Medical History:   Diagnosis Date    Cancer [facility]     CLL (chronic lymphocytic leukemia) ([facility]     Coronary artery disease     Dyslipidemia     Neurological disorder     TIA (transient ischemic attack) 9/2015    TIA (transient ischemic attack)            PAST SURGICAL HISTORY     Past Surgical History:   Procedure Laterality Date    HX GYN  2004    [address]    HX OTHER SURGICAL  1998    ganglion cyst removed- wrist R    HX OTHER SURGICAL  1998    breast surgery    HX OTHER SURGICAL  1990    fibrodema (left breast)     HX OTHER SURGICAL  1999    ganglin cyst of the right hand     GA BREAST SURGERY PROCEDURE UNLISTED      GA INSERTION SUBQ CARDIAC RHYTHM MONITOR W/PRGRMG N/A 12/2/2019    LOOP RECORDER INSERT performed by Trinity Zapata MD at [facility]          ALLERGIES     Allergies   Allergen Reactions    Ampicillin Hives    Bacitracin Other (comments)    Bactrim [Sulfamethoprim] Hives          FAMILY HISTORY     Family History   Problem Relation Age of Onset    Cancer Mother         renal, bladder    negative for cardiac disease       SOCIAL HISTORY     Social History     Socioeconomic History    Marital status:      Spouse name: Not on file    Number of children: Not on file  Years of education: Not on file    Highest education level: Not on file   Tobacco Use    Smoking status: Never Smoker    Smokeless tobacco: Never Used   Substance and Sexual Activity    Alcohol use: No    Drug use: No    Sexual activity: Not Currently   Social History Narrative    ** Merged History Encounter **          Social Determinants of Health     Financial Resource Strain:     Difficulty of Paying Living Expenses:    Food Insecurity:     Worried About Running Out of Food in the Last Year:     Ran Out of Food in the Last Year:    Transportation Needs:     Lack of Transportation (Medical):  Lack of Transportation (Non-Medical):    Physical Activity:     Days of Exercise per Week:     Minutes of Exercise per Session:    Stress:     Feeling of Stress :    Social Connections:     Frequency of Communication with Friends and Family:     Frequency of Social Gatherings with Friends and Family:     Attends Je[employer] witness Services:     Active Member of Clubs or Organizations:     Attends Club or Organization Meetings:     Marital Status:          MEDICATIONS     Current Outpatient Medications   Medication Sig    augmented betamethasone dipropionate (DIPROLENE-AF) 0.05 % topical cream Apply  to affected area two (2) times daily as needed for Skin Irritation.  valACYclovir (VALTREX) 500 mg tablet TAKE 1 TABLET BY MOUTH TWICE DAILY FOR 7 DAYS    loratadine (Claritin) 10 mg tablet Take 1 Tablet by mouth daily for 360 days.  traZODone (DESYREL) 50 mg tablet Take 1 Tablet by mouth nightly. Indications: insomnia associated with depression    atorvastatin (LIPITOR) 40 mg tablet Take 1 Tab by mouth nightly.  dilTIAZem ER (TIAZAC) 120 mg capsule Take 1 capsule by mouth once daily    clopidogreL (Plavix) 75 mg tab Take 1 Tab by mouth nightly.  diclofenac (VOLTAREN) 1 % gel Apply 4 g to affected area four (4) times daily.  shoulder    chlorhexidine (Peridex) 0.12 % solution 15 mL by Swish and Spit route every twelve (12) hours.  ALPRAZolam (XANAX) 0.25 mg tablet Take 1 Tab by mouth two (2) times daily as needed for Anxiety. Max Daily Amount: 0.5 mg.    aspirin 81 mg chewable tablet Take 1 Tab by mouth daily. No current facility-administered medications for this visit. I have reviewed the nurses notes, vitals, problem list, allergy list, medical history, family, social history and medications. REVIEW OF SYMPTOMS      General: Pt denies excessive weight gain or loss. Pt is able to conduct ADL's  HEENT: Denies blurred vision, headaches, hearing loss, epistaxis and difficulty swallowing. Respiratory: Denies cough, congestion, shortness of breath, SERRANO, wheezing or stridor. Cardiovascular: Denies precordial pain, palpitations, edema or PND  Gastrointestinal: Denies poor appetite, indigestion, abdominal pain or blood in stool  Genitourinary: Denies hematuria, dysuria, increased urinary frequency  Musculoskeletal: Denies joint pain or swelling from muscles or joints  Neurologic: Denies tremor, paresthesias, headache, or sensory motor disturbance  Psychiatric: Denies confusion, insomnia, depression  Integumentray: Denies rash, itching or ulcers. Hematologic: Denies easy bruising, bleeding       PHYSICAL EXAMINATION      Vitals: see vitals section  General: Well developed, in no acute distress. HEENT: No jaundice, oral mucosa moist, no oral ulcers  Neck: Supple, no stiffness, no lymphadenopathy, supple  Heart:  Normal S1/S2 negative S3 or S4. Regular, no murmur, gallop or rub, no jugular venous distention  Respiratory: Clear bilaterally x 4, no wheezing or rales  Abdomen:   Soft, non-tender, bowel sounds are active. Extremities:  No edema, normal cap refill, no cyanosis. Musculoskeletal: No clubbing, no deformities  Neuro: A&Ox3, speech clear, gait stable, cooperative, no focal neurologic deficits  Skin: Skin color is normal. No rashes or lesions.  Non diaphoretic, moist.  Vascular: 2+ pulses symmetric in all extremities       DIAGNOSTIC DATA      EKG:     Visit Vitals  BP (!) 140/70 (BP 1 Location: Left upper arm, BP Patient Position: Sitting, BP Cuff Size: Adult)   Pulse 80   Resp 22   Ht 5' 5\" (1.651 m)   Wt 171 lb 9.6 oz (77.8 kg)   SpO2 98%   BMI 28.56 kg/m²          LABORATORY DATA      Lab Results   Component Value Date/Time    WBC 10.1 07/21/2021 10:19 AM    HGB 13.4 07/21/2021 10:19 AM    HCT 41.0 07/21/2021 10:19 AM    PLATELET 385 87/75/4321 10:19 AM    MCV 91.1 07/21/2021 10:19 AM      Lab Results   Component Value Date/Time    Sodium 143 07/21/2021 10:19 AM    Potassium 4.5 07/21/2021 10:19 AM    Chloride 106 07/21/2021 10:19 AM    CO2 27 07/21/2021 10:19 AM    Anion gap 10 07/21/2021 10:19 AM    Glucose 97 07/21/2021 10:19 AM    BUN 18 07/21/2021 10:19 AM    Creatinine 0.74 07/21/2021 10:19 AM    BUN/Creatinine ratio 24 (H) 07/21/2021 10:19 AM    GFR est AA >60 07/21/2021 10:19 AM    GFR est non-AA >60 07/21/2021 10:19 AM    Calcium 9.4 07/21/2021 10:19 AM    Bilirubin, total 0.6 07/21/2021 10:19 AM    Alk. phosphatase 106 07/21/2021 10:19 AM    Protein, total 6.9 07/21/2021 10:19 AM    Albumin 4.1 07/21/2021 10:19 AM    Globulin 2.8 07/21/2021 10:19 AM    A-G Ratio 1.5 07/21/2021 10:19 AM    ALT (SGPT) 29 07/21/2021 10:19 AM         ASSESSMENT/PLAN      1. Cryptogenic stroke              A. ILR  2. CAD  3. Dyslipidemia      Continue current medical therapy and monitor for recurrent tachycardia for which we can increase her diltiazem. Continue follow ups per device clinic every 3 months remotely and once per year in office. FOLLOW UP      1 year    Thank you, Rom Rodríguez MD for allowing me to participate in the care of this extraordinarily pleasant female. Please do not hesitate to contact me for further questions/concerns.        J[facility]OTI Peterson[facility].  [address], [address], [address]  [place], [address]    [facility], [address]  [phone] / [phone] Fax   [phone] / [phone] Fax

## 2021-07-26 ENCOUNTER — OFFICE VISIT (OUTPATIENT)
Dept: CARDIOLOGY CLINIC | Age: 71
End: 2021-07-26
Payer: MEDICARE

## 2021-07-26 VITALS
WEIGHT: 171.6 LBS | DIASTOLIC BLOOD PRESSURE: 70 MMHG | OXYGEN SATURATION: 98 % | HEART RATE: 80 BPM | BODY MASS INDEX: 28.59 KG/M2 | RESPIRATION RATE: 22 BRPM | HEIGHT: 65 IN | SYSTOLIC BLOOD PRESSURE: 140 MMHG

## 2021-07-26 DIAGNOSIS — G45.9 TIA (TRANSIENT ISCHEMIC ATTACK): Primary | ICD-10-CM

## 2021-07-26 DIAGNOSIS — R00.0 TACHYCARDIA: ICD-10-CM

## 2021-07-26 PROCEDURE — G8419 CALC BMI OUT NRM PARAM NOF/U: HCPCS | Performed by: NURSE PRACTITIONER

## 2021-07-26 PROCEDURE — G8536 NO DOC ELDER MAL SCRN: HCPCS | Performed by: NURSE PRACTITIONER

## 2021-07-26 PROCEDURE — 3017F COLORECTAL CA SCREEN DOC REV: CPT | Performed by: NURSE PRACTITIONER

## 2021-07-26 PROCEDURE — 99214 OFFICE O/P EST MOD 30 MIN: CPT | Performed by: NURSE PRACTITIONER

## 2021-07-26 PROCEDURE — G0463 HOSPITAL OUTPT CLINIC VISIT: HCPCS | Performed by: NURSE PRACTITIONER

## 2021-07-26 PROCEDURE — G8399 PT W/DXA RESULTS DOCUMENT: HCPCS | Performed by: NURSE PRACTITIONER

## 2021-07-26 PROCEDURE — G8432 DEP SCR NOT DOC, RNG: HCPCS | Performed by: NURSE PRACTITIONER

## 2021-07-26 PROCEDURE — 1090F PRES/ABSN URINE INCON ASSESS: CPT | Performed by: NURSE PRACTITIONER

## 2021-07-26 PROCEDURE — G8427 DOCREV CUR MEDS BY ELIG CLIN: HCPCS | Performed by: NURSE PRACTITIONER

## 2021-07-26 PROCEDURE — 1101F PT FALLS ASSESS-DOCD LE1/YR: CPT | Performed by: NURSE PRACTITIONER

## 2021-07-26 RX ORDER — BETAMETHASONE DIPROPIONATE 0.5 MG/G
CREAM TOPICAL
COMMUNITY
End: 2022-09-27 | Stop reason: ALTCHOICE

## 2021-07-26 NOTE — PROGRESS NOTES
Room #: 3      Visit Vitals  BP (!) 140/70 (BP 1 Location: Left upper arm, BP Patient Position: Sitting, BP Cuff Size: Adult)   Pulse 80   Resp 22   Ht 5' 5\" (1.651 m)   Wt 171 lb 9.6 oz (77.8 kg)   SpO2 98%   BMI 28.56 kg/m²         Chest pain:  NO  Shortness of breath:  NO  Edema: NO  Palpitations, skipped beats, rapid heartbeat:  NO  Dizziness:  YES    1. Have you been to the ER, urgent care clinic since your last visit? Hospitalized since your last visit? No    2. Have you seen or consulted any other health care providers outside of the [address] [place] [address] since your last visit? Include any pap smears or colon screening.  No      Refills:  NO

## 2021-07-26 NOTE — LETTER
7/26/2021    Patient: Madie Palafox   YOB: 1950   Date of Visit: 7/26/2021     Isaiah Centeno, [address] [facility] [address]  [address] [facility] [address]    Dear Isaiah Centeno MD,      Thank you for referring Ms. Tasneem Bridges to [facility] for evaluation. My notes for this consultation are attached. If you have questions, please do not hesitate to call me. I look forward to following your patient along with you.       Sincerely,    Carolyne Oropeza NP

## 2021-08-10 NOTE — PROGRESS NOTES
PHYSICAL THERAPY EVALUATION/DISCHARGE  Patient: Reinaldo Strickland (9 y.o. female)  Date: 10/16/2019  Primary Diagnosis: Acute confusional state [F05]  Seizure ([facility] [R56.9]  TIA (transient ischemic attack) [G45.9]       Precautions:          ASSESSMENT  Based on the objective data described below, the patient presents with good strength, ROM, and balance following admission for a TIA and confusion. She reports her initial symptoms lasted ~35 minutes with complete resolution at this time. No deficits observed and gait independent with a fast bruno. Functional Outcome Measure: The patient scored 51/56 on the BRANTLEY outcome measure which is indicative of low fall risk. 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 PLAN :  Recommendation for discharge: (in order for the patient to meet his/her long term goals)  No skilled physical therapy/ follow up rehabilitation needs identified at this time. Equipment recommendations for successful discharge: none       SUBJECTIVE:   Patient stated I usually walk faster than this.     OBJECTIVE DATA SUMMARY:   HISTORY:    Past Medical History:   Diagnosis Date    Cancer ([facility]     CLL (chronic lymphocytic leukemia) ([facility]     Stroke ([facility]     TIA (transient ischemic attack) 9/2015     Past Surgical History:   Procedure Laterality Date    BREAST SURGERY PROCEDURE UNLISTED      HX GYN  2004    [address]    HX OTHER SURGICAL  1998    ganglion cyst removed- wrist R    HX OTHER SURGICAL  1998    breast surgery    HX OTHER SURGICAL  1990    fibrodema (left breast)     HX OTHER SURGICAL  1999    ganglin cyst of the right hand        Prior level of function:   Personal factors and/or comorbidities impacting plan of care:     Home Situation  Home Environment: Private residence  One/Two Story Residence: Two story, live on 1st floor  Lift Chair Available: Yes  Living Alone: No  Support Systems: Family member(s)  Patient Expects to be Discharged to[de-identified] Private residence  Current DME Used/Available at Home: jennifer Faulkner, Walker, tonya    EXAMINATION/PRESENTATION/DECISION MAKING:   Critical Behavior:  Neurologic State: Alert  Orientation Level: Oriented X4  Cognition: Appropriate decision making, Follows commands  Safety/Judgement: Awareness of environment  Hearing: Auditory  Auditory Impairment: None  Skin:    Edema:   Range Of Motion:  AROM: Within functional limits           PROM: Within functional limits           Strength:    Strength:  Within functional limits                    Tone & Sensation:   Tone: Normal                              Coordination:  Coordination: Within functional limits  Vision:   Corrective Lenses: Glasses  Functional Mobility:  Bed Mobility:  Rolling: Independent  Supine to Sit: Independent  Sit to Supine: Independent  Scooting: Independent  Transfers:  Sit to Stand: Independent  Stand to Sit: Independent        Bed to Chair: Independent              Balance:   Sitting: Intact  Standing: Intact  Ambulation/Gait Training:  Distance (ft): 200 Feet (ft)  Assistive Device: Gait belt  Ambulation - Level of Assistance: Independent     Gait Description (WDL): Exceptions to WDL           Base of Support: Narrowed     Speed/Mary: Accelerated      Functional Measure:  Ordonez Balance Test:    Sitting to Standing: 3  Standing Unsupported: 4  Sitting with Back Unsupported: 4  Standing to Sittin  Transfers: 3  Standing Unsupported with Eyes Closed: 4  Standing Unsupported with Feet Together: 4  Reach Forward with Outstretched Arm: 4   Object: 4  Turn to Look Over Shoulders: 4  Turn 360 Degrees: 4  Alternate Foot on Step/Stool: 3  Standing Unsupported One Foot in Front: 4  Stand on One Le  Total: 51/56         56=Maximum possible score;   0-20=High fall risk  21-40=Moderate fall risk   41-56=Low fall risk Physical Therapy Evaluation Charge Determination   History Examination Presentation Decision-Making   MEDIUM  Complexity : 1-2 comorbidities / personal factors will impact the outcome/ POC  LOW Complexity : 1-2 Standardized tests and measures addressing body structure, function, activity limitation and / or participation in recreation  LOW Complexity : Stable, uncomplicated  LOW Complexity : FOTO score of       Based on the above components, the patient evaluation is determined to be of the following complexity level: LOW     Pain Rating:      Activity Tolerance: WNL  Please refer to the flowsheet for vital signs taken during this treatment. After treatment patient left in no apparent distress:   Sitting in chair    COMMUNICATION/EDUCATION:   The patients plan of care was discussed with: Registered Nurse. Fall prevention education was provided and the patient/caregiver indicated understanding., Patient/family have participated as able in goal setting and plan of care. and Patient/family agree to work toward stated goals and plan of care.     Thank you for this referral.  Davina Costa, PT, DPT   Time Calculation: 30 mins Zuhair Dean(Attending)

## 2021-08-13 ENCOUNTER — OFFICE VISIT (OUTPATIENT)
Dept: CARDIOLOGY CLINIC | Age: 71
End: 2021-08-13
Payer: MEDICARE

## 2021-08-13 DIAGNOSIS — Z95.818 STATUS POST PLACEMENT OF IMPLANTABLE LOOP RECORDER: Primary | ICD-10-CM

## 2021-08-13 PROCEDURE — 93298 REM INTERROG DEV EVAL SCRMS: CPT | Performed by: NURSE PRACTITIONER

## 2021-08-13 NOTE — LETTER
8/16/2021 1:38 PM    Ms. Boateng [employer]  [address] [facility]      Dear Ms. Ashley Langford,    We have received the remote transmission for your implanted loop recorder dated  8/13/2021. I see where you pressed the patient symptom activator button. However, there rhythm recorded was sinus rhythm 80-90 beats per minutes - completely normal heart rate with no arrhythmias. There have been no other abnormal heart rate episodes recorded. Your battery status is \"OK\". We will continue to monitor your device remotely as long as your home monitor remains plugged into power. Thank you for remaining connected remotely! Sincerely,    Ilana BURNETT, RN  Cardiac Device Clinic Coordinator  [facility].[address]  [address]  [phone]

## 2021-09-13 ENCOUNTER — OFFICE VISIT (OUTPATIENT)
Dept: CARDIOLOGY CLINIC | Age: 71
End: 2021-09-13
Payer: MEDICARE

## 2021-09-13 DIAGNOSIS — Z95.818 STATUS POST PLACEMENT OF IMPLANTABLE LOOP RECORDER: Primary | ICD-10-CM

## 2021-09-13 PROCEDURE — 93298 REM INTERROG DEV EVAL SCRMS: CPT | Performed by: NURSE PRACTITIONER

## 2021-10-14 ENCOUNTER — OFFICE VISIT (OUTPATIENT)
Dept: CARDIOLOGY CLINIC | Age: 71
End: 2021-10-14
Payer: MEDICARE

## 2021-10-14 DIAGNOSIS — Z95.818 STATUS POST PLACEMENT OF IMPLANTABLE LOOP RECORDER: Primary | ICD-10-CM

## 2021-10-14 NOTE — LETTER
10/18/2021 1:01 PM    Ms. Boateng [employer]  [address] [facility]        Dear Ms. Elva Snow,    We have received the remote transmission for your implanted loop recorder dated 10/18/21. You have had no unusual heart rate episodes recorded. Your battery status is \"OK\". Your next scheduled remote transmission is  11/15/2021. We will continue to monitor your device remotely as long as your home monitor remains plugged into power. Thank you for remaining connected remotely! Sincerely,    Tana CARVAJALN, RN  Cardiac Device Clinic Coordinator  [facility] [address] [place] [address].  [address] [place] [address], [address] [place] [address]  [phone]

## 2021-10-18 PROCEDURE — 93298 REM INTERROG DEV EVAL SCRMS: CPT | Performed by: NURSE PRACTITIONER

## 2021-11-15 ENCOUNTER — OFFICE VISIT (OUTPATIENT)
Dept: CARDIOLOGY CLINIC | Age: 71
End: 2021-11-15
Payer: MEDICARE

## 2021-11-15 DIAGNOSIS — Z95.818 STATUS POST PLACEMENT OF IMPLANTABLE LOOP RECORDER: Primary | ICD-10-CM

## 2021-11-15 PROCEDURE — 93298 REM INTERROG DEV EVAL SCRMS: CPT | Performed by: NURSE PRACTITIONER

## 2021-11-15 NOTE — LETTER
11/15/2021 4:02 PM    Ms. Boateng [employer]  [address] [facility]      Dear Ms. Jer Bailey,    We have received the remote transmission for your implanted loop recorder dated 11/15/2021. You have had no unusual heart rate episodes recorded. Your battery status is \"OK\". Your next scheduled remote transmission is 12/20/2021. We will continue to monitor your device remotely as long as your home monitor remains plugged into power. Thank you for remaining connected remotely! Sincerely,    Denver Betters BSN, RN  Cardiac Device Clinic Coordinator  [facility] [address] [place] [address] 82.  [address], [address] [facility]  [phone]

## 2021-12-20 ENCOUNTER — OFFICE VISIT (OUTPATIENT)
Dept: CARDIOLOGY CLINIC | Age: 71
End: 2021-12-20
Payer: MEDICARE

## 2021-12-20 DIAGNOSIS — Z95.818 STATUS POST PLACEMENT OF IMPLANTABLE LOOP RECORDER: Primary | ICD-10-CM

## 2021-12-20 PROCEDURE — 93298 REM INTERROG DEV EVAL SCRMS: CPT | Performed by: NURSE PRACTITIONER

## 2022-01-24 ENCOUNTER — OFFICE VISIT (OUTPATIENT)
Dept: CARDIOLOGY CLINIC | Age: 72
End: 2022-01-24
Payer: MEDICARE

## 2022-01-24 DIAGNOSIS — Z95.818 STATUS POST PLACEMENT OF IMPLANTABLE LOOP RECORDER: Primary | ICD-10-CM

## 2022-01-24 PROCEDURE — 93298 REM INTERROG DEV EVAL SCRMS: CPT | Performed by: INTERNAL MEDICINE

## 2022-01-24 NOTE — LETTER
1/24/2022 1:38 PM    Ms. Boateng [employer]  [address] [facility]      Dear Ms. Polina Durham,    We have received the remote transmission for your implanted loop recorder dated 1/24/2022. You have had no unusual heart rate episodes recorded. Your battery status is \"OK\". Your next scheduled remote transmission is 2/28/2022. We will continue to monitor your device remotely as long as your home monitor remains plugged into power. Thank you for remaining connected remotely! Sincerely,    Cierra CARVAJALN, RN  Cardiac Device Clinic Coordinator  [facility] [address] [place] [address] 82.  [address], [address] [facility]  [phone]

## 2022-02-11 RX ORDER — CLOPIDOGREL BISULFATE 75 MG/1
TABLET ORAL
Qty: 90 TABLET | Refills: 0 | Status: SHIPPED | OUTPATIENT
Start: 2022-02-11 | End: 2022-05-16

## 2022-02-11 RX ORDER — DILTIAZEM HYDROCHLORIDE 120 MG/1
CAPSULE, EXTENDED RELEASE ORAL
Qty: 90 CAPSULE | Refills: 0 | Status: SHIPPED | OUTPATIENT
Start: 2022-02-11 | End: 2022-05-16

## 2022-02-11 RX ORDER — ATORVASTATIN CALCIUM 40 MG/1
TABLET, FILM COATED ORAL
Qty: 90 TABLET | Refills: 0 | Status: SHIPPED | OUTPATIENT
Start: 2022-02-11 | End: 2022-05-16

## 2022-02-11 NOTE — LETTER
2/11/2022 7:06 AM    Ms. Boateng [employer]  [address] [facility]      Dear Ms. [place]:    We've missed you! We have sent a 90 day supply of your medication to pharmacy,but please call our office at [phone] and schedule a follow up appointment for your continued care before the next refill.             Sincerely,      Lex Curling, NP

## 2022-02-11 NOTE — TELEPHONE ENCOUNTER
90 day supply of medication approved hut she is overdue for OV. Please call and assist with schedulign appt. No more med refills without an  OV.

## 2022-02-28 ENCOUNTER — OFFICE VISIT (OUTPATIENT)
Dept: CARDIOLOGY CLINIC | Age: 72
End: 2022-02-28
Payer: MEDICARE

## 2022-02-28 DIAGNOSIS — Z95.818 STATUS POST PLACEMENT OF IMPLANTABLE LOOP RECORDER: Primary | ICD-10-CM

## 2022-02-28 PROCEDURE — 93298 REM INTERROG DEV EVAL SCRMS: CPT | Performed by: INTERNAL MEDICINE

## 2022-03-19 PROBLEM — E78.5 HYPERLIPIDEMIA LDL GOAL <70: Status: ACTIVE | Noted: 2018-08-13

## 2022-03-19 PROBLEM — R42 VERTIGO: Status: ACTIVE | Noted: 2017-06-13

## 2022-03-20 PROBLEM — I63.9 CVA (CEREBRAL VASCULAR ACCIDENT) (HCC): Status: ACTIVE | Noted: 2020-06-01

## 2022-03-20 PROBLEM — R56.9 SEIZURE (HCC): Status: ACTIVE | Noted: 2019-10-15

## 2022-03-20 PROBLEM — G45.9 TIA (TRANSIENT ISCHEMIC ATTACK): Status: ACTIVE | Noted: 2018-09-12

## 2022-03-28 ENCOUNTER — OFFICE VISIT (OUTPATIENT)
Dept: FAMILY MEDICINE CLINIC | Age: 72
End: 2022-03-28
Payer: MEDICARE

## 2022-03-28 VITALS
BODY MASS INDEX: 29.32 KG/M2 | TEMPERATURE: 98.4 F | HEART RATE: 78 BPM | DIASTOLIC BLOOD PRESSURE: 82 MMHG | SYSTOLIC BLOOD PRESSURE: 132 MMHG | HEIGHT: 65 IN | WEIGHT: 176 LBS | RESPIRATION RATE: 16 BRPM | OXYGEN SATURATION: 97 %

## 2022-03-28 DIAGNOSIS — E78.5 HYPERLIPIDEMIA LDL GOAL <70: ICD-10-CM

## 2022-03-28 DIAGNOSIS — R56.9 SEIZURE (HCC): ICD-10-CM

## 2022-03-28 DIAGNOSIS — I10 ESSENTIAL HYPERTENSION: Primary | ICD-10-CM

## 2022-03-28 DIAGNOSIS — R73.9 ELEVATED BLOOD SUGAR: ICD-10-CM

## 2022-03-28 DIAGNOSIS — I63.9 CEREBROVASCULAR ACCIDENT (CVA), UNSPECIFIED MECHANISM (HCC): ICD-10-CM

## 2022-03-28 PROCEDURE — 3017F COLORECTAL CA SCREEN DOC REV: CPT | Performed by: FAMILY MEDICINE

## 2022-03-28 PROCEDURE — 1090F PRES/ABSN URINE INCON ASSESS: CPT | Performed by: FAMILY MEDICINE

## 2022-03-28 PROCEDURE — 99214 OFFICE O/P EST MOD 30 MIN: CPT | Performed by: FAMILY MEDICINE

## 2022-03-28 PROCEDURE — G8510 SCR DEP NEG, NO PLAN REQD: HCPCS | Performed by: FAMILY MEDICINE

## 2022-03-28 PROCEDURE — G8419 CALC BMI OUT NRM PARAM NOF/U: HCPCS | Performed by: FAMILY MEDICINE

## 2022-03-28 PROCEDURE — G8536 NO DOC ELDER MAL SCRN: HCPCS | Performed by: FAMILY MEDICINE

## 2022-03-28 PROCEDURE — G8427 DOCREV CUR MEDS BY ELIG CLIN: HCPCS | Performed by: FAMILY MEDICINE

## 2022-03-28 PROCEDURE — 1101F PT FALLS ASSESS-DOCD LE1/YR: CPT | Performed by: FAMILY MEDICINE

## 2022-03-28 PROCEDURE — G8752 SYS BP LESS 140: HCPCS | Performed by: FAMILY MEDICINE

## 2022-03-28 PROCEDURE — G0463 HOSPITAL OUTPT CLINIC VISIT: HCPCS | Performed by: FAMILY MEDICINE

## 2022-03-28 PROCEDURE — G8399 PT W/DXA RESULTS DOCUMENT: HCPCS | Performed by: FAMILY MEDICINE

## 2022-03-28 PROCEDURE — G8754 DIAS BP LESS 90: HCPCS | Performed by: FAMILY MEDICINE

## 2022-03-28 NOTE — PROGRESS NOTES
Chief Complaint   Patient presents with    Hypertension    Labs     Fasting today     1. Have you been to the ER, urgent care clinic since your last visit? Hospitalized since your last visit? No    2. Have you seen or consulted any other health care providers outside of the [address] [place] [address] since your last visit? Include any pap smears or colon screening. No             Chief Complaint   Patient presents with    Hypertension    Labs     Fasting today     She is a 66 y.o. female who presents for evalution. Reviewed PmHx, RxHx, FmHx, SocHx, AllgHx and updated and dated in the chart. Patient Active Problem List    Diagnosis    Essential hypertension    Elevated blood sugar    CVA (cerebral vascular accident) ([facility]    Seizure ([facility]    TIA (transient ischemic attack)    Hyperlipidemia LDL goal <70    Stroke syndrome    Vertigo       Review of Systems - negative except as listed above in the HPI    Objective:     Vitals:    03/28/22 0827   BP: 132/82   Pulse: 78   Resp: 16   Temp: 98.4 °F (36.9 °C)   TempSrc: Oral   SpO2: 97%   Weight: 176 lb (79.8 kg)   Height: 5' 5\" (1.651 m)         Assessment/ Plan:   Diagnoses and all orders for this visit:    1. Essential hypertension  -     LIPID PANEL; Future  -     METABOLIC PANEL, COMPREHENSIVE; Future  -at goal    2. Seizure ([facility]  -stable    3. Cerebrovascular accident (CVA), unspecified mechanism ([facility]  -     LIPID PANEL; Future  -     METABOLIC PANEL, COMPREHENSIVE; Future  -no sxs    4. Hyperlipidemia LDL goal <70  -     LIPID PANEL; Future  -     METABOLIC PANEL, COMPREHENSIVE; Future    5. Elevated blood sugar  -     METABOLIC PANEL, COMPREHENSIVE; Future  -     HEMOGLOBIN A1C WITH EAG; Future  Lab Results   Component Value Date/Time    Hemoglobin A1c 5.8 (H) 07/21/2021 10:19 AM     -dwp diet           I have discussed the diagnosis with the patient and the intended plan as seen in the above orders.   The patient understands and agrees with the plan. The patient has received an after-visit summary and questions were answered concerning future plans. Medication Side Effects and Warnings were discussed with patient  Patient Labs were reviewed and or requested:  Patient Past Records were reviewed and or requested    Pattie Heck M.D. There are no Patient Instructions on file for this visit.

## 2022-03-29 LAB
ALBUMIN SERPL-MCNC: 4.1 G/DL (ref 3.5–5)
ALBUMIN/GLOB SERPL: 1.4 {RATIO} (ref 1.1–2.2)
ALP SERPL-CCNC: 128 U/L (ref 45–117)
ALT SERPL-CCNC: 32 U/L (ref 12–78)
ANION GAP SERPL CALC-SCNC: 6 MMOL/L (ref 5–15)
AST SERPL-CCNC: 14 U/L (ref 15–37)
BILIRUB SERPL-MCNC: 0.5 MG/DL (ref 0.2–1)
BUN SERPL-MCNC: 21 MG/DL (ref 6–20)
BUN/CREAT SERPL: 23 (ref 12–20)
CALCIUM SERPL-MCNC: 9.2 MG/DL (ref 8.5–10.1)
CHLORIDE SERPL-SCNC: 107 MMOL/L (ref 97–108)
CHOLEST SERPL-MCNC: 151 MG/DL
CO2 SERPL-SCNC: 27 MMOL/L (ref 21–32)
CREAT SERPL-MCNC: 0.92 MG/DL (ref 0.55–1.02)
EST. AVERAGE GLUCOSE BLD GHB EST-MCNC: 126 MG/DL
GLOBULIN SER CALC-MCNC: 2.9 G/DL (ref 2–4)
GLUCOSE SERPL-MCNC: 100 MG/DL (ref 65–100)
HBA1C MFR BLD: 6 % (ref 4–5.6)
HDLC SERPL-MCNC: 47 MG/DL
HDLC SERPL: 3.2 {RATIO} (ref 0–5)
LDLC SERPL CALC-MCNC: 63.4 MG/DL (ref 0–100)
POTASSIUM SERPL-SCNC: 4.2 MMOL/L (ref 3.5–5.1)
PROT SERPL-MCNC: 7 G/DL (ref 6.4–8.2)
SODIUM SERPL-SCNC: 140 MMOL/L (ref 136–145)
TRIGL SERPL-MCNC: 203 MG/DL (ref ?–150)
VLDLC SERPL CALC-MCNC: 40.6 MG/DL

## 2022-03-29 NOTE — PROGRESS NOTES
Patient's labs are good and stable. No changes to medical regimen. Please encourage patient (if appropriate) to sign up for Mychart and text them the link if needed.     Dr. Anuja Trejo

## 2022-04-04 ENCOUNTER — OFFICE VISIT (OUTPATIENT)
Dept: CARDIOLOGY CLINIC | Age: 72
End: 2022-04-04
Payer: MEDICARE

## 2022-04-04 DIAGNOSIS — Z95.818 STATUS POST PLACEMENT OF IMPLANTABLE LOOP RECORDER: Primary | ICD-10-CM

## 2022-04-04 PROCEDURE — 93298 REM INTERROG DEV EVAL SCRMS: CPT | Performed by: INTERNAL MEDICINE

## 2022-04-04 NOTE — PROGRESS NOTES
chargeable implanted loop remote transmission    Since 2/27/22: no episodes recorded. Normal heart rate histogram with majority of rates 80's. See scanned report in  for details.

## 2022-04-04 NOTE — LETTER
4/4/2022 9:47 AM    Ms. Boateng [employer]  [address] [employer]     Dear Ms. Sunita Carpio,    We have received the remote transmission for your implanted loop recorder dated 4/4/22. You have had no unusual heart rate episodes recorded. Your battery status is \"OK\". Your next scheduled remote transmission is 5/9/22. We will continue to monitor your device remotely as long as your home monitor remains plugged into power. Thank you for remaining connected remotely!     Sincerely,    Loretta De La Paz RN, BSN  Device Coordinator  [phone]

## 2022-05-09 ENCOUNTER — OFFICE VISIT (OUTPATIENT)
Dept: CARDIOLOGY CLINIC | Age: 72
End: 2022-05-09
Payer: MEDICARE

## 2022-05-09 DIAGNOSIS — Z95.818 STATUS POST PLACEMENT OF IMPLANTABLE LOOP RECORDER: Primary | ICD-10-CM

## 2022-05-09 PROCEDURE — 93298 REM INTERROG DEV EVAL SCRMS: CPT | Performed by: INTERNAL MEDICINE

## 2022-05-16 RX ORDER — ATORVASTATIN CALCIUM 40 MG/1
TABLET, FILM COATED ORAL
Qty: 90 TABLET | Refills: 0 | Status: SHIPPED | OUTPATIENT
Start: 2022-05-16 | End: 2022-08-16 | Stop reason: SDUPTHER

## 2022-05-16 RX ORDER — CLOPIDOGREL BISULFATE 75 MG/1
TABLET ORAL
Qty: 90 TABLET | Refills: 0 | Status: SHIPPED | OUTPATIENT
Start: 2022-05-16 | End: 2022-08-26 | Stop reason: SDUPTHER

## 2022-05-16 RX ORDER — DILTIAZEM HYDROCHLORIDE 120 MG/1
CAPSULE, EXTENDED RELEASE ORAL
Qty: 90 CAPSULE | Refills: 0 | Status: SHIPPED | OUTPATIENT
Start: 2022-05-16 | End: 2022-08-26 | Stop reason: SDUPTHER

## 2022-06-07 ENCOUNTER — DOCUMENTATION ONLY (OUTPATIENT)
Dept: FAMILY MEDICINE CLINIC | Age: 72
End: 2022-06-07

## 2022-06-13 ENCOUNTER — OFFICE VISIT (OUTPATIENT)
Dept: CARDIOLOGY CLINIC | Age: 72
End: 2022-06-13
Payer: MEDICARE

## 2022-06-13 DIAGNOSIS — Z95.818 STATUS POST PLACEMENT OF IMPLANTABLE LOOP RECORDER: Primary | ICD-10-CM

## 2022-06-13 PROCEDURE — 93298 REM INTERROG DEV EVAL SCRMS: CPT | Performed by: INTERNAL MEDICINE

## 2022-06-13 NOTE — PROGRESS NOTES
chargeable implanted loop remote    Since 5/7/22: 1 pt symptom activation for SR-ST 's. No other events recorded. See scanned report in  for details.

## 2022-06-13 NOTE — LETTER
6/13/2022 10:58 AM    Ms. Boateng [employer]  [address] [facility]    Dear Ms. Sunita Carpio,    We have received the remote transmission for your implanted loop recorder dated 6/13/22. You have had no unusual heart rate episodes recorded. When you pressed the patient symptom activator on 6/1/22 your heart rate was 90-low 100's. Your battery status is \"OK\". Your next scheduled remote transmission is 7/18/22. We will continue to monitor your device remotely as long as your home monitor remains plugged into power. Thank you for remaining connected remotely!     Sincerely,    Loretta De La Paz RN, BSN  Device Coordinator  [phone]

## 2022-07-05 ENCOUNTER — DOCUMENTATION ONLY (OUTPATIENT)
Dept: FAMILY MEDICINE CLINIC | Age: 72
End: 2022-07-05

## 2022-07-18 ENCOUNTER — OFFICE VISIT (OUTPATIENT)
Dept: CARDIOLOGY CLINIC | Age: 72
End: 2022-07-18
Payer: MEDICARE

## 2022-07-18 DIAGNOSIS — Z95.818 STATUS POST PLACEMENT OF IMPLANTABLE LOOP RECORDER: Primary | ICD-10-CM

## 2022-07-18 PROCEDURE — 93298 REM INTERROG DEV EVAL SCRMS: CPT | Performed by: INTERNAL MEDICINE

## 2022-07-28 ENCOUNTER — OFFICE VISIT (OUTPATIENT)
Dept: FAMILY MEDICINE CLINIC | Age: 72
End: 2022-07-28
Payer: MEDICARE

## 2022-07-28 DIAGNOSIS — G47.00 INSOMNIA, UNSPECIFIED TYPE: ICD-10-CM

## 2022-07-28 DIAGNOSIS — R11.2 NAUSEA AND VOMITING, UNSPECIFIED VOMITING TYPE: Primary | ICD-10-CM

## 2022-07-28 PROCEDURE — G8756 NO BP MEASURE DOC: HCPCS | Performed by: FAMILY MEDICINE

## 2022-07-28 PROCEDURE — G0463 HOSPITAL OUTPT CLINIC VISIT: HCPCS | Performed by: FAMILY MEDICINE

## 2022-07-28 PROCEDURE — G8427 DOCREV CUR MEDS BY ELIG CLIN: HCPCS | Performed by: FAMILY MEDICINE

## 2022-07-28 PROCEDURE — 1101F PT FALLS ASSESS-DOCD LE1/YR: CPT | Performed by: FAMILY MEDICINE

## 2022-07-28 PROCEDURE — 3017F COLORECTAL CA SCREEN DOC REV: CPT | Performed by: FAMILY MEDICINE

## 2022-07-28 PROCEDURE — G8510 SCR DEP NEG, NO PLAN REQD: HCPCS | Performed by: FAMILY MEDICINE

## 2022-07-28 PROCEDURE — G8536 NO DOC ELDER MAL SCRN: HCPCS | Performed by: FAMILY MEDICINE

## 2022-07-28 PROCEDURE — G8399 PT W/DXA RESULTS DOCUMENT: HCPCS | Performed by: FAMILY MEDICINE

## 2022-07-28 PROCEDURE — G9899 SCRN MAM PERF RSLTS DOC: HCPCS | Performed by: FAMILY MEDICINE

## 2022-07-28 PROCEDURE — 99213 OFFICE O/P EST LOW 20 MIN: CPT | Performed by: FAMILY MEDICINE

## 2022-07-28 PROCEDURE — 1123F ACP DISCUSS/DSCN MKR DOCD: CPT | Performed by: FAMILY MEDICINE

## 2022-07-28 PROCEDURE — 1090F PRES/ABSN URINE INCON ASSESS: CPT | Performed by: FAMILY MEDICINE

## 2022-07-28 PROCEDURE — G8417 CALC BMI ABV UP PARAM F/U: HCPCS | Performed by: FAMILY MEDICINE

## 2022-07-28 RX ORDER — TRAZODONE HYDROCHLORIDE 50 MG/1
50 TABLET ORAL
Qty: 30 TABLET | Refills: 1 | Status: SHIPPED | OUTPATIENT
Start: 2022-07-28 | End: 2022-09-27 | Stop reason: ALTCHOICE

## 2022-07-28 NOTE — PROGRESS NOTES
Consent: Page Bustos, who was seen by synchronous (real-time) audio-video technology, and/or her healthcare decision maker, is aware that this patient-initiated, Telehealth encounter on 7/28/2022 is a billable service, with coverage as determined by her insurance carrier. She is aware that she may receive a bill and has provided verbal consent to proceed: YES-Consent obtained within past 12 months  712    Prior to Admission medications    Medication Sig Start Date End Date Taking? Authorizing Provider   traZODone (DESYREL) 50 mg tablet Take 1 Tablet by mouth nightly. Indications: insomnia associated with depression 7/28/22  Yes Lucia Harvey MD   atorvastatin (LIPITOR) 40 mg tablet TAKE ONE TABLET BY MOUTH ONCE NIGHTLY 5/16/22   Lucia Harvey MD   clopidogreL (PLAVIX) 75 mg tab TAKE ONE TABLET BY MOUTH ONCE NIGHTLY 5/16/22   Lucia Harvey MD   dilTIAZem ER [facility]) 120 mg capsule TAKE ONE CAPSULE BY MOUTH DAILY 5/16/22   Lucia Harvey MD   augmented betamethasone dipropionate (DIPROLENE-AF) 0.05 % topical cream Apply  to affected area two (2) times daily as needed for Skin Irritation. Provider, Patito   valACYclovir (VALTREX) 500 mg tablet TAKE 1 TABLET BY MOUTH TWICE DAILY FOR 7 DAYS 7/21/21   Lucia Harvey MD   traZODone (DESYREL) 50 mg tablet Take 1 Tablet by mouth nightly. Indications: insomnia associated with depression  Patient not taking: Reported on 3/28/2022 7/21/21 7/28/22  Lucia Harvey MD   diclofenac (VOLTAREN) 1 % gel Apply 4 g to affected area four (4) times daily. shoulder 11/10/20   Lucia Harvey MD   chlorhexidine (Peridex) 0.12 % solution 15 mL by Swish and Spit route every twelve (12) hours. 11/2/20   Lucia Harvey MD   ALPRAZolam [place] [facility]) 0.25 mg tablet Take 1 Tab by mouth two (2) times daily as needed for Anxiety. Max Daily Amount: 0.5 mg. 10/9/20   Lucia Harvey MD   aspirin 81 mg chewable tablet Take 1 Tab by mouth daily.  10/16/19   ePtra Weinstein MD Allergies   Allergen Reactions    Ampicillin Hives    Bacitracin Other (comments)    Bactrim [Sulfamethoprim] Hives           Assessment & Plan:   Diagnoses and all orders for this visit:    1. Nausea and vomiting, unspecified vomiting type  Patient had episode nausea vomiting last night and felt like her legs were weak and her daughter called rescue squad they took her to the ED. Negative work-up in ED. Patient now home does feel better. She is drinking plenty of water. Increased stress with recent passing of her friend. Continue supportive care and call if symptoms do worsen. 2. Insomnia, unspecified type  -     traZODone (DESYREL) 50 mg tablet; Take 1 Tablet by mouth nightly. Indications: insomnia associated with depression        Medication Side Effects and Warnings were discussed with patient  Patient Labs were reviewed and or requested:  Patient Past Records were reviewed and or requested              We discussed the expected course, resolution and complications of the diagnosis(es) in detail. Medication risks, benefits, costs, interactions, and alternatives were discussed as indicated. I advised her to contact the office if her condition worsens, changes or fails to improve as anticipated. She expressed understanding with the diagnosis(es) and plan. Page Bustos, was evaluated through a synchronous (real-time) audio-video encounter. The patient (or guardian if applicable) is aware that this is a billable service, which includes applicable co-pays. This Virtual Visit was conducted with patient's (and/or legal guardian's) consent. The visit was conducted pursuant to the emergency declaration under the [facility], [address] [place] [address] authority and the [employer] General Act. Patient identification was verified, and a caregiver was present when appropriate.  The patient was located in a state where the provider was licensed to provide care. Services were provided through a video synchronous discussion virtually to substitute for in-person clinic visit. Patient and provider were located at their individual homes. I have discussed the diagnosis with the patient and the intended plan as seen in the above orders. The patient understands and agrees with the plan. The patient has received an after-visit summary and questions were answered concerning future plans. Medication Side Effects and Warnings were discussed with patient  Patient Labs were reviewed and or requested:  Patient Past Records were reviewed and or requested    Rina Crum M.D. There are no Patient Instructions on file for this visit.

## 2022-08-09 ENCOUNTER — NURSE TRIAGE (OUTPATIENT)
Dept: OTHER | Facility: CLINIC | Age: 72
End: 2022-08-09

## 2022-08-09 NOTE — TELEPHONE ENCOUNTER
Received call from [address] at [facility] with Red Flag Complaint. Subjective: Caller states \"on 7/27 was in ER for dizziness, vomiting and weakness. On 8/2 was diagnosed with UTI, since yesterday has started feeling dizzy, vomiting, headache and weakness\"     Current Symptoms: see above    Onset: 2 weeks ago; waxing and waning    Associated Symptoms: NA    Pain Severity: 4/10; aching; intermittent    Temperature: denies fever     What has been tried: 81 mg Aspirin    LMP: NA Pregnant: NA    Recommended disposition: Go to ED/UCC Now (Or to Office with PCP Approval)    Care advice provided, patient verbalizes understanding; denies any other questions or concerns; instructed to call back for any new or worsening symptoms. Writer provided warm transfer to Janelle Resendiz at [facility] for 2nd level triage    Attention Provider: Thank you for allowing me to participate in the care of your patient. The patient was connected to triage in response to information provided to the ECC. Please do not respond through this encounter as the response is not directed to a shared pool.       Reason for Disposition   SEVERE dizziness (e.g., unable to stand, requires support to walk, feels like passing out now)    Protocols used: Dizziness-ADULT-[place]

## 2022-08-09 NOTE — TELEPHONE ENCOUNTER
NT notes reviewed. No appts available in office today or tomorrow. Gave pt information for [facility].

## 2022-08-17 RX ORDER — ATORVASTATIN CALCIUM 40 MG/1
40 TABLET, FILM COATED ORAL
Qty: 90 TABLET | Refills: 0 | Status: SHIPPED | OUTPATIENT
Start: 2022-08-17

## 2022-08-22 ENCOUNTER — OFFICE VISIT (OUTPATIENT)
Dept: CARDIOLOGY CLINIC | Age: 72
End: 2022-08-22
Payer: MEDICARE

## 2022-08-22 DIAGNOSIS — Z95.818 STATUS POST PLACEMENT OF IMPLANTABLE LOOP RECORDER: Primary | ICD-10-CM

## 2022-08-22 PROCEDURE — 93298 REM INTERROG DEV EVAL SCRMS: CPT | Performed by: INTERNAL MEDICINE

## 2022-08-29 RX ORDER — DILTIAZEM HYDROCHLORIDE 120 MG/1
120 CAPSULE, EXTENDED RELEASE ORAL DAILY
Qty: 90 CAPSULE | Refills: 0 | Status: SHIPPED | OUTPATIENT
Start: 2022-08-29

## 2022-08-29 RX ORDER — CLOPIDOGREL BISULFATE 75 MG/1
75 TABLET ORAL
Qty: 90 TABLET | Refills: 0 | Status: SHIPPED | OUTPATIENT
Start: 2022-08-29

## 2022-09-26 ENCOUNTER — OFFICE VISIT (OUTPATIENT)
Dept: CARDIOLOGY CLINIC | Age: 72
End: 2022-09-26
Payer: MEDICARE

## 2022-09-26 DIAGNOSIS — Z95.818 STATUS POST PLACEMENT OF IMPLANTABLE LOOP RECORDER: Primary | ICD-10-CM

## 2022-09-26 PROCEDURE — 93298 REM INTERROG DEV EVAL SCRMS: CPT | Performed by: INTERNAL MEDICINE

## 2022-09-27 ENCOUNTER — OFFICE VISIT (OUTPATIENT)
Dept: FAMILY MEDICINE CLINIC | Age: 72
End: 2022-09-27
Payer: MEDICARE

## 2022-09-27 VITALS
HEIGHT: 65 IN | TEMPERATURE: 97.6 F | OXYGEN SATURATION: 97 % | DIASTOLIC BLOOD PRESSURE: 84 MMHG | HEART RATE: 81 BPM | RESPIRATION RATE: 20 BRPM | WEIGHT: 176 LBS | BODY MASS INDEX: 29.32 KG/M2 | SYSTOLIC BLOOD PRESSURE: 139 MMHG

## 2022-09-27 DIAGNOSIS — R73.9 ELEVATED BLOOD SUGAR: ICD-10-CM

## 2022-09-27 DIAGNOSIS — R56.9 SEIZURE (HCC): ICD-10-CM

## 2022-09-27 DIAGNOSIS — E78.5 HYPERLIPIDEMIA LDL GOAL <70: ICD-10-CM

## 2022-09-27 DIAGNOSIS — I10 ESSENTIAL HYPERTENSION: ICD-10-CM

## 2022-09-27 DIAGNOSIS — I63.9 CEREBROVASCULAR ACCIDENT (CVA), UNSPECIFIED MECHANISM (HCC): Primary | ICD-10-CM

## 2022-09-27 PROCEDURE — 3017F COLORECTAL CA SCREEN DOC REV: CPT | Performed by: FAMILY MEDICINE

## 2022-09-27 PROCEDURE — G8417 CALC BMI ABV UP PARAM F/U: HCPCS | Performed by: FAMILY MEDICINE

## 2022-09-27 PROCEDURE — 99214 OFFICE O/P EST MOD 30 MIN: CPT | Performed by: FAMILY MEDICINE

## 2022-09-27 PROCEDURE — G8752 SYS BP LESS 140: HCPCS | Performed by: FAMILY MEDICINE

## 2022-09-27 PROCEDURE — G8399 PT W/DXA RESULTS DOCUMENT: HCPCS | Performed by: FAMILY MEDICINE

## 2022-09-27 PROCEDURE — G8536 NO DOC ELDER MAL SCRN: HCPCS | Performed by: FAMILY MEDICINE

## 2022-09-27 PROCEDURE — G0463 HOSPITAL OUTPT CLINIC VISIT: HCPCS | Performed by: FAMILY MEDICINE

## 2022-09-27 PROCEDURE — 1123F ACP DISCUSS/DSCN MKR DOCD: CPT | Performed by: FAMILY MEDICINE

## 2022-09-27 PROCEDURE — G8427 DOCREV CUR MEDS BY ELIG CLIN: HCPCS | Performed by: FAMILY MEDICINE

## 2022-09-27 PROCEDURE — G9899 SCRN MAM PERF RSLTS DOC: HCPCS | Performed by: FAMILY MEDICINE

## 2022-09-27 PROCEDURE — G8754 DIAS BP LESS 90: HCPCS | Performed by: FAMILY MEDICINE

## 2022-09-27 PROCEDURE — G8510 SCR DEP NEG, NO PLAN REQD: HCPCS | Performed by: FAMILY MEDICINE

## 2022-09-27 PROCEDURE — 1090F PRES/ABSN URINE INCON ASSESS: CPT | Performed by: FAMILY MEDICINE

## 2022-09-27 PROCEDURE — 1101F PT FALLS ASSESS-DOCD LE1/YR: CPT | Performed by: FAMILY MEDICINE

## 2022-09-27 RX ORDER — ESTRADIOL 10 UG/1
10 INSERT VAGINAL DAILY
COMMUNITY

## 2022-09-27 RX ORDER — NITROFURANTOIN MACROCRYSTALS 50 MG/1
50 CAPSULE ORAL 4 TIMES DAILY
COMMUNITY

## 2022-09-27 NOTE — PROGRESS NOTES
Patient here for 6 months fasting labs. 1. Have you been to the ER, urgent care clinic since your last visit? Hospitalized since your last visit? No    2. Have you seen or consulted any other health care providers outside of the [address] [place] [address] since your last visit? Include any pap smears or colon screening. No           Chief Complaint   Patient presents with    Labs     6 month fasting labs     She is a 66 y.o. female who presents for evalution. Reviewed PmHx, RxHx, FmHx, SocHx, AllgHx and updated and dated in the chart. Patient Active Problem List    Diagnosis    Essential hypertension    Elevated blood sugar    CVA (cerebral vascular accident) ([facility]    Seizure ([facility]    TIA (transient ischemic attack)    Hyperlipidemia LDL goal <70    Stroke syndrome    Vertigo       Review of Systems - negative except as listed above in the HPI    Objective:     Vitals:    09/27/22 0826   BP: 139/84   Pulse: 81   Resp: 20   Temp: 97.6 °F (36.4 °C)   SpO2: 97%   Weight: 176 lb (79.8 kg)   Height: 5' 5\" (1.651 m)         Assessment/ Plan:   Diagnoses and all orders for this visit:    1. Cerebrovascular accident (CVA), unspecified mechanism ([facility]  -     LIPID PANEL; Future  -     METABOLIC PANEL, COMPREHENSIVE; Future    2. Essential hypertension  -     LIPID PANEL; Future  -     METABOLIC PANEL, COMPREHENSIVE; Future  -at goal    3. Elevated blood sugar  -     HEMOGLOBIN A1C WITH EAG; Future  -     METABOLIC PANEL, COMPREHENSIVE; Future  Lab Results   Component Value Date/Time    Hemoglobin A1c 6.0 (H) 03/28/2022 08:50 AM       4. Hyperlipidemia LDL goal <70  -     LIPID PANEL; Future  -     METABOLIC PANEL, COMPREHENSIVE; Future  -hima rx    5. [facility] ([facility]  -stable         Follow-up and Dispositions    Return in about 1 year (around 9/27/2023). I have discussed the diagnosis with the patient and the intended plan as seen in the above orders. The patient understands and agrees with the plan.  The patient has received an after-visit summary and questions were answered concerning future plans. Medication Side Effects and Warnings were discussed with patient  Patient Labs were reviewed and or requested:  Patient Past Records were reviewed and or requested    Darren Cuevas M.D. There are no Patient Instructions on file for this visit.

## 2022-09-27 NOTE — PROGRESS NOTES
Patient here for 6 months fasting labs. 1. Have you been to the ER, urgent care clinic since your last visit? Hospitalized since your last visit? No    2. Have you seen or consulted any other health care providers outside of the [address] [place] [address] since your last visit? Include any pap smears or colon screening.  No Progress Note - Critical Care   Nichelle Dust 60 y o  female MRN: [id number]  Unit/Bed#: MICU 01 Encounter: [id number]    SUBJECTIVE:  60 y o  female w/ EtOH, Cirrhosis, HCV, HCC, PV Thrombosis, Chronic LE wound on 4/3 p/w wounds, intubated on  after seizure  Pt examined at bedside, intubated and comatose w/o sedation  MRI today for prognostication  Family meeting   HPI/24HR Event:  NAEON    Lasix 40mg given @ 6194, net daily I/O -582, [facility] I/O +26329  Minimal output via rectal tube  Cont Vaso @ 0 04  Levo drip decr  From 18 to 16  Midazolam drip stopped @ 1755  Gastrograffin study completed - no evidence of gastrograffin distal to stomach    Medications  Hydrocortisone 100mg TID  Lacosamide 200mg  BID  Levetiracetam 2500mg BID  Rifaximin 550mg  BID  Topiramate 200mg  BID    Nicoderm patch      ROS  Unable to assess    Vitals  Temperature:   Temp (24hrs), Av 4 °F (36 3 °C), Min:95 4 °F (35 2 °C), Max:99 °F (37 2 °C)    Current: Temperature: 97 5 °F (36 4 °C)    Temp:  [95 4 °F (35 2 °C)-99 °F (37 2 °C)] 97 5 °F (36 4 °C)  HR:  [] 86  Resp:  [11-17] 11  Arterial Line BP: ()/(42-64) 146/60  FiO2 (%):  [50] 50   Arterial Line BP: 146/60  Arterial Line MAP (mmHg): 84 mmHg    Vent: CMV f 12 / Vt 400 / FiO2 50 / PEEP 8    Intake and Outputs:  I/O       04/15 0701 -  0700  07 -  0700    I V  (mL/kg) 3871 9 (38 3) 3362 7 (33 3)    NG/ 470    IV Piggyback 50 550    Total Intake(mL/kg) 4801 9 (47 5) 4382 7 (43 4)    Urine (mL/kg/hr) 1140 (0 5) 4965 (2)    Emesis/NG output 1800     Stool 0     Total Output 2940 4965    Net +1861 9 -582  3                Invasive lines and devices:   Invasive Devices     Central Venous Catheter Line            CVC Central Lines 21 Triple 20cm 7 days          Arterial Line            Arterial Line 21 Radial 7 days          Drain            Rectal Tube 9 days    NG/OG/Enteral Tube 18 Fr Center mouth 8 days    Urethral Catheter Temperature probe 16 Fr  8 days          Airway            ETT  Oral 7 5 mm 8 days                   Physical exam  General: sedated, intubated, in no acute distress  Neuro: GCS 3 (Eye 1, Verbal 1, Motor 1)  HENT: Normocephalic and atraumatic, PERRL  Heart: irregular  No murmurs   Lungs: Bilateral breath sounds present  Abdomen: Bowel sounds minimal, soft  Skin:  diffusely 3+ pitting edematous skin  Cold feet  B/L UE petechia and scabbing  LEs wrapped  Labs:   Results from last 7 days   Lab Units 21  0426 21  0342 04/15/21  1036  21  0451  21  0530   WBC Thousand/uL 6 15 7 60 8 03   < > 6 19  --  8 27   HEMOGLOBIN g/dL 9 4* 8 4* 8 7*   < > 8 0*   < > 8 4*   HEMATOCRIT % 29 7* 26 8* 27 6*   < > 26 2*   < > 28 3*   PLATELETS Thousands/uL 49* 59* 71*   < > 63*   < > 55*   NEUTROS PCT % 86*  --   --   --  74  --  76*   MONOS PCT % 4  --   --   --  6  --  8   MONO PCT %  --   --  7  --   --   --   --     < > = values in this interval not displayed        Results from last 7 days   Lab Units 21  0426 21  1354 04/16/21  0342 04/15/21  0446   SODIUM mmol/L 150* 154* 152* 153*   POTASSIUM mmol/L 3 4* 3 8 4 1 4 0   CHLORIDE mmol/L 124* 130* 129* 129*   CO2 mmol/L 20* 19* 18* 17*   BUN mg/dL 30* 29* 27* 20   CREATININE mg/dL 1 08 1 25 1 23 1 19   CALCIUM mg/dL 8 6 8 3 8 5 8 2*   ALK PHOS U/L 98  --  90 95   ALT U/L 41  --  39 35   AST U/L 73*  --  99* 97*     Results from last 7 days   Lab Units 21  0426 21  0342 04/15/21  0446   MAGNESIUM mg/dL 2 0 2 1 2 1     Results from last 7 days   Lab Units 21  0426 21  0342 04/15/21  0446   PHOSPHORUS mg/dL 3 6 4 5 5 1*      Results from last 7 days   Lab Units 21  0426 21  1027 04/15/21  1036   INR  2 01* 2 14* 2 17*     Results from last 7 days   Lab Units 21  1027   LACTIC ACID mmol/L 2 0     Imagin/16 Gastrograffin study: none seen beyond stomach    Micro:  Lab Results   Component Value Date    BLOODCX Received in Microbiology Lab  Culture in Progress  04/16/2021    BLOODCX No Growth at 24 hrs  04/15/2021    BLOODCX No Growth After 5 Days  04/09/2021    URINECX >100,000 cfu/ml Escherichia coli (A) 04/03/2021    WOUNDCULT 4+ Growth of Escherichia coli (A) 04/04/2021    WOUNDCULT 4+ Growth of Acinetobacter baumannii (A) 04/04/2021    WOUNDCULT 4+ Growth of  04/04/2021    SPUTUMCULTUR No growth 04/09/2021       Assessment & Plan:   -Neuro:   Dx: Acute Encephalopathy 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, poss 2/2 EtOH, Cirrhosis  Negative CTH x 2  Completed course of acyclovir  4/13 LP: prot 100, 3 wbc, neg ME panel     - weaned midazolam off  Switched Keppra 2500 BID to IV formulation  Lacosamide 200 BID  Hold topamax 2/2 SBO/Ileus  Cont Folate, Thiamine    - Palliative care, Neurology following   - serial neuro exams   - continuous video EEG   - MRI today for prognostication   - Family meeting scheduled 4/19/21   - Analgesia: PRN Fentanyl, midazolam  - Sedation - none      CV:   - Dx: Hypotension likely 2/2 shock state  ECHO w/ EF 60% and no valvular disorders   - Levo weaned overnight 18 to 16, cont to wean as tolerated w/ MAP goal >65   - Vaso @ 0 04  - Dx: Atrial Fibrillation  EKG 03/11/21 sinus  Repeat EKGs since 04/09 have alternated sinus vs Afib  D/C'd Amnio 4/15 w/ conversion     - monitor VS, hold systemic AC w/ anemia/thrombocytopenia         Lung:   - Dx: AHRF likely 2/2 seizures, encephalopathy  - Vent day 10  SCMV 12/400/8/50%  Riding vent rate   - Dx: Mild L apical Pneumo   - monitor airway pressures on vent  - Dx: Aspiration PNA vs pneumonitis   - Elevated procalcitonin  4/16: BCx NGTD, Bronch w/ Cxs: GS neg, NGTD     - No Abx indicated at this time  - Continue Respiratory Protocol and Airway Clearance Protocol      GI:   - Dx: Cirrhosis w/ elevated INR (2 01), anemia (9 4), thrombocytopenia(49), Hyperbilirubinemia (3 3)  Presumed 2/2 HCV, EtOH   W/ Hx of HCC s/p ablation, Portal Vein Thrombosis  MELD 19    - Hold Rifaximin 550mg BID 2/2 poss SBO  - Dx: SB Ileus vs obstruction  AXR 4/15: dilated loops of small bowel suggestive of ileus  Gastrograffin 4/17: no passage beyond stomach  Unable to tolerate Erythromycin, metoclopramide D/C'd  - Low intermittent suction via NG   - Hold PO meds  - Surgery C/S  CT A/P to further evaluate obstruction    - Stress ulcer ppx: pantoprazole 40  - Bowel regimen: Rectal tube in place      FEN:   - Dx: Hypernatremia   150 today  - Fluids: d5 @ 75 cc/hr  - Electrolytes: trend and replete as needed  - Nutrition: TF, Vit D on hold 2/2 poss SBO   - cont IV Thiamine, Folate      :   Dx: Vol overload  Net +28L  Cr 1 08  - I/O last 24 hours: In: 1007 [I V :5226; NG/GT:690; IV Piggyback:550]  Out: 3619 [Urine:5610; Emesis/NG output:950]    - Cont Lasix 40mg BID w/ goal -2 to -3 L   - [place] day # 8  - Dx: E coli UTI on 4/3 - resolved   - Finished Abx course    ID:   - Dx: no acute infections  ID following pt  - WBC: 6 15  - Temp: 97 5, has been hypothermic intermittently during hospitalization  - Abx: none indicated at this time per ID recs  - Cultures: 4/16 BCx, Sputum Cxs pending    - Monitor WBC/temp curve    Heme:   - Dx: Anemia, Thrombocytopenia, Elevated INR   Likely 2/2 Liver - as above  - Hb: 9 4  - DVT ppx: Lovenox 40 QD, SCDs     Endo:   - Dx: no active problems  - Goal -180    Msk/Skin:   - Dx: b/l LE wounds w/o acute evidence of infection    - RLE Cx 4/4: E coli, acinetobacter   - appreciate podiatry, ID recs   - cont wound care  - OT - signed off, will re-evaluate once medically stable  - Turning/repositioning    Disposition: cont ICU care, family meeting 4/20  Code Status: Level 2 - DNAR: but accepts endotracheal intubation    Allergies: No Known Allergies    Imaging Studies  FL small bowel   Final Result by Yarely Ledezma MD (04/16 7528)      No evidence of Gastrografin distal to the stomach, 6 hours following administration by NG tube  Workstation performed: [id number]         XR abdomen 1 view kub   Final Result by Ean Alberts DO (04/15 1034)      Dilated loops of small bowel, suggestive of ileus  Continued follow-up is recommended to exclude obstruction  No free air  Tube or drain tip overlies the gastroesophageal junction  If this represents an enteric tube, this should be advanced  Correlate clinically for appropriate position  The study was marked in [facility] for immediate notification  Workstation performed: [id number]         XR chest portable ICU   Final Result by Ean Alberts DO (04/15 1036)      Unchanged left hydropneumothorax  Hazy left greater than right lung consolidations unchanged  Additional probable enteric tube overlying the gastroesophageal junction, although this could be external to the patient  Correlate with external devices  Workstation performed: [id number]         [facility] [place] lumbar puncture   Final Result by Edwin Darnell MD (04/14 9602)      Successful fluoroscopically guided lumbar puncture with an opening pressure of 41 cm H2O  Approximately 16 5 mL's of CSF was sent to lab for requested analysis  As noted above, the patient's CSF was initially clear yellow and became bloody during the    collection process  The needle was withdrawn slightly and the CSF fluid slowly cleared  After a discussion with the MICU team, the patient will receive an additional unit of FFP post procedure  Please feel free to contact us with any questions or concerns  I reviewed the above findings and procedure with Dr Jody Gamez  PERFORMED, DICTATED AND SIGNED BY: Ruperto Cantrell PA-C         Workstation performed: [id number]         MRI brain seizure wo and w contrast   Final Result by Elan Perez MD (04/11 3923)         1    Subtle, faint diffusion restriction in the pulvinar of the right thalamus with associated T2/FLAIR hyperintensity, a nonspecific finding which may be related to subacute infarction, which statistically would probably be the most likely differential    diagnosis  Other unusual differential considerations would include Behcet's disease or other connective tissue disorders like Sjogren's syndrome  Infectious encephalitis disease such as West Nile virus could be considered in the appropriate clinical    setting  A nonenhancing glioma is also possible as are reports of status epilepticus  The unilateral appearance argues against prion disease such as CJD  Further clinical assessment recommended  Recommend repeat follow-up contrast enhanced MRI of the    brain in 6-12 weeks to assess for stability  2   A few white matter lesions elsewhere may represent microangiopathy, although given the findings in the right thalamic pulvinar, other etiologies are certainly possible  3  No MR evidence of mesial temporal sclerosis  4   No classic MR findings of herpes encephalitis  Further clinical assessment and follow-up recommended  Workstation performed: [id number]         XR chest portable ICU   Final Result by Constance Schilling MD (04/11 1142)      Small left apical pneumothorax, unchanged from previous examination  Unchanged bilateral pleural effusions and pulmonary parenchymal airspace opacities  Workstation performed: [id number]         XR chest portable ICU   Final Result by Sanya Meza MD (04/10 1253)      Small left apical pneumothorax identified possibly slightly increased from prior exam       Bilateral alveolar opacities in keeping with pneumonia  Small bilateral pleural effusions  Workstation performed: [id number]         XR chest portable ICU   Final Result by Wendy Smyth MD (04/09 1610)   Left internal jugular central line terminates in the right atrium, and should be pulled back by 3 cm  Unchanged small left pneumothorax        Worsening bilateral pneumonia  Stable small left pleural effusion  The study was marked in [facility] for immediate notification           Workstation performed: [id number]         MRI inpatient order    (Results Pending)        Medications:   Scheduled Meds:  Current Facility-Administered Medications   Medication Dose Route Frequency Provider Last Rate    chlorhexidine  15 mL Swish & Spit Q12H [address] Bakari Guallpa PA-C      cholecalciferol  400 Units Oral Daily Bakari Guallpa PA-C      dextrose  75 mL/hr Intravenous Continuous Analia Sweeney PA-C 75 mL/hr (04/17/21 0304)    diazepam  10 mg Intravenous Q6H PRN Tia Beatty PA-C      enoxaparin  40 mg Subcutaneous Q24H [address] Garret Vital MD      fentanyl citrate (PF)  50 mcg Intravenous Q1H PRN Tia Beatty PA-C      folic acid IVPB  1 mg Intravenous Daily Analia Sweeney PA-C Stopped (04/16/21 1125)    furosemide  40 mg Intravenous BID (diuretic) Tia Beatty PA-C      hydrocortisone sodium succinate  100 mg Intravenous Q8H [address] Annemarie Jensen MD      lacosamide (VIMPAT) IVPB  200 mg Intravenous Q12H Analia Sweeney PA-C Stopped (04/16/21 2230)    levETIRAcetam  2,500 mg Intravenous Q12H [address] Analia Tamez PA-C Stopped (04/16/21 2215)    midazolam  20 mg/hr Intravenous Continuous Garret Vital MD Stopped (04/17/21 0050)    nicotine  1 patch Transdermal Daily Bakari Guallpa PA-C      norepinephrine  1-30 mcg/min Intravenous Titrated Bakari Guallpa PA-C 16 mcg/min (04/17/21 0545)    ondansetron  4 mg Intravenous Q6H PRN Bakari Guallpa PA-C      pantoprazole  40 mg Intravenous Q24H [address] David Sweeney PA-C      rifaximin  550 mg Oral Q12H [address] Bakari Guallpa PA-C      thiamine  100 mg Intravenous Daily Analia Tamez PA-C Stopped (04/16/21 1050)    topiramate  200 mg Oral Q12H [address] Anni Burris DO      vasopressin  0 04 Units/min Intravenous Continuous Annemarie MD Mikey 0 04 Units/min (04/17/21 2188)     Continuous Infusions:dextrose, 75 mL/hr, Last Rate: 75 mL/hr (04/17/21 0304)  midazolam, 20 mg/hr, Last Rate: Stopped (04/17/21 0050)  norepinephrine, 1-30 mcg/min, Last Rate: 16 mcg/min (04/17/21 0845)  vasopressin, 0 04 Units/min, Last Rate: 0 04 Units/min (04/17/21 1578)      PRN Meds:  diazepam, 10 mg, Q6H PRN  fentanyl citrate (PF), 50 mcg, Q1H PRN  ondansetron, 4 mg, Q6H PRN        Counseling / Coordination of Care       Portions of the record may have been created with voice recognition software  Occasional wrong word or "sound a like" substitutions may have occurred due to the inherent limitations of voice recognition software  Read the chart carefully and recognize, using context, where substitutions have occurred       Wili Doppelgames

## 2022-09-28 LAB
ALBUMIN SERPL-MCNC: 3.9 G/DL (ref 3.5–5)
ALBUMIN/GLOB SERPL: 1.4 {RATIO} (ref 1.1–2.2)
ALP SERPL-CCNC: 129 U/L (ref 45–117)
ALT SERPL-CCNC: 31 U/L (ref 12–78)
ANION GAP SERPL CALC-SCNC: 8 MMOL/L (ref 5–15)
AST SERPL-CCNC: 15 U/L (ref 15–37)
BILIRUB SERPL-MCNC: 0.5 MG/DL (ref 0.2–1)
BUN SERPL-MCNC: 15 MG/DL (ref 6–20)
BUN/CREAT SERPL: 16 (ref 12–20)
CALCIUM SERPL-MCNC: 9.1 MG/DL (ref 8.5–10.1)
CHLORIDE SERPL-SCNC: 106 MMOL/L (ref 97–108)
CHOLEST SERPL-MCNC: 148 MG/DL
CO2 SERPL-SCNC: 26 MMOL/L (ref 21–32)
CREAT SERPL-MCNC: 0.91 MG/DL (ref 0.55–1.02)
EST. AVERAGE GLUCOSE BLD GHB EST-MCNC: 128 MG/DL
GLOBULIN SER CALC-MCNC: 2.8 G/DL (ref 2–4)
GLUCOSE SERPL-MCNC: 99 MG/DL (ref 65–100)
HBA1C MFR BLD: 6.1 % (ref 4–5.6)
HDLC SERPL-MCNC: 40 MG/DL
HDLC SERPL: 3.7 {RATIO} (ref 0–5)
LDLC SERPL CALC-MCNC: 44.6 MG/DL (ref 0–100)
POTASSIUM SERPL-SCNC: 4.4 MMOL/L (ref 3.5–5.1)
PROT SERPL-MCNC: 6.7 G/DL (ref 6.4–8.2)
SODIUM SERPL-SCNC: 140 MMOL/L (ref 136–145)
TRIGL SERPL-MCNC: 317 MG/DL (ref ?–150)
VLDLC SERPL CALC-MCNC: 63.4 MG/DL

## 2022-11-14 RX ORDER — ATORVASTATIN CALCIUM 40 MG/1
TABLET, FILM COATED ORAL
Qty: 90 TABLET | Refills: 0 | Status: SHIPPED | OUTPATIENT
Start: 2022-11-14

## 2022-11-23 RX ORDER — CLOPIDOGREL BISULFATE 75 MG/1
TABLET ORAL
Qty: 90 TABLET | Refills: 0 | Status: SHIPPED | OUTPATIENT
Start: 2022-11-23

## 2022-12-08 ENCOUNTER — OFFICE VISIT (OUTPATIENT)
Dept: FAMILY MEDICINE CLINIC | Age: 72
End: 2022-12-08
Payer: MEDICARE

## 2022-12-08 ENCOUNTER — TELEPHONE (OUTPATIENT)
Dept: FAMILY MEDICINE CLINIC | Age: 72
End: 2022-12-08

## 2022-12-08 ENCOUNTER — HOSPITAL ENCOUNTER (OUTPATIENT)
Dept: GENERAL RADIOLOGY | Age: 72
End: 2022-12-08
Payer: MEDICARE

## 2022-12-08 VITALS
BODY MASS INDEX: 28.82 KG/M2 | WEIGHT: 173 LBS | TEMPERATURE: 98.6 F | DIASTOLIC BLOOD PRESSURE: 77 MMHG | HEIGHT: 65 IN | SYSTOLIC BLOOD PRESSURE: 116 MMHG | HEART RATE: 82 BPM | RESPIRATION RATE: 16 BRPM | OXYGEN SATURATION: 96 %

## 2022-12-08 DIAGNOSIS — J40 BRONCHITIS: Primary | ICD-10-CM

## 2022-12-08 DIAGNOSIS — R53.83 MALAISE AND FATIGUE: ICD-10-CM

## 2022-12-08 DIAGNOSIS — R53.81 MALAISE AND FATIGUE: ICD-10-CM

## 2022-12-08 DIAGNOSIS — R93.89 ABNORMAL CHEST X-RAY: ICD-10-CM

## 2022-12-08 DIAGNOSIS — E55.9 HYPOVITAMINOSIS D: ICD-10-CM

## 2022-12-08 LAB
FLUAV+FLUBV AG NOSE QL IA.RAPID: NEGATIVE
FLUAV+FLUBV AG NOSE QL IA.RAPID: NEGATIVE
VALID INTERNAL CONTROL?: YES

## 2022-12-08 PROCEDURE — 87804 INFLUENZA ASSAY W/OPTIC: CPT | Performed by: NURSE PRACTITIONER

## 2022-12-08 PROCEDURE — 71046 X-RAY EXAM CHEST 2 VIEWS: CPT

## 2022-12-08 PROCEDURE — G0463 HOSPITAL OUTPT CLINIC VISIT: HCPCS | Performed by: NURSE PRACTITIONER

## 2022-12-08 RX ORDER — DILTIAZEM HYDROCHLORIDE 120 MG/1
120 CAPSULE, EXTENDED RELEASE ORAL DAILY
Qty: 90 CAPSULE | Refills: 1 | Status: SHIPPED | OUTPATIENT
Start: 2022-12-08

## 2022-12-08 RX ORDER — ALBUTEROL SULFATE 90 UG/1
1 AEROSOL, METERED RESPIRATORY (INHALATION)
Qty: 18 G | Refills: 2 | Status: SHIPPED | OUTPATIENT
Start: 2022-12-08

## 2022-12-08 RX ORDER — PREDNISONE 10 MG/1
TABLET ORAL
Qty: 21 TABLET | Refills: 0 | Status: SHIPPED | OUTPATIENT
Start: 2022-12-08

## 2022-12-08 NOTE — TELEPHONE ENCOUNTER
----- Message from [facility] sent at 12/8/2022  3:21 PM EST -----  Subject: Message to Provider    QUESTIONS  Information for Provider? The imaging location at [address] is   permanently closed  ---------------------------------------------------------------------------  --------------  [address]  [phone]; OK to leave message on voicemail  ---------------------------------------------------------------------------  --------------  SCRIPT ANSWERS  Relationship to Patient?  Self

## 2022-12-08 NOTE — PROGRESS NOTES
Chief Complaint   Patient presents with    Cough    Fatigue     Patient in office today for follow up from urgent care. Pt was seen at [facility] on 11/27 due to cough. Did not receive COVID testing. Did a home test that was negative last week. Received labs. Pt was told her WBC count was elevated. Xray was completed-pt stated was advised a shadowy image on xray but UC did not further investigate. \"Foggy. \"  Pt was dx with bronchitis. Prescribed doxycyline and tessalon. Cough is better. Still productive. \"Not as thick. \"  Denies any wheezing. Does have some SOB and dyspnea. Denies any hemoptysis. Pt did report improvement of sx while on abx. Completed doxy this morning. Has been having chills intermittently. Then gets really hot. Has not been checking her temperature. Cough and fatigue is still present. Productive, secretions are yellow and have thinned in consistency. Not treating with otc products. Denies any other concerns at this time. Chief Complaint   Patient presents with    Cough    Fatigue     she is a 67y.o. year old female who presents for evalution. Reviewed PmHx, RxHx, FmHx, SocHx, AllgHx and updated and dated in the chart.     Review of Systems - negative except as listed above in the HPI    Current Outpatient Medications   Medication Instructions    albuterol (PROVENTIL HFA, VENTOLIN HFA, PROAIR HFA) 90 mcg/actuation inhaler 1 Puff, Inhalation, EVERY 4 HOURS AS NEEDED    aspirin 81 mg, Oral, DAILY    atorvastatin (LIPITOR) 40 mg tablet TAKE ONE TABLET BY MOUTH ONCE NIGHTLY    chlorhexidine (Peridex) 0.12 % solution 15 mL, Swish and Spit, EVERY 12 HOURS    clopidogreL (PLAVIX) 75 mg tab TAKE ONE TABLET BY MOUTH ONCE NIGHTLY    diclofenac (VOLTAREN) 4 g, Topical, 4 TIMES DAILY, shoulder    dilTIAZem ER (TIAZAC) 120 mg, Oral, DAILY    nitrofurantoin (MACRODANTIN) 50 mg, Oral, 4 TIMES DAILY, Twice per week for uti prevention    predniSONE (STERAPRED DS) 10 mg dose pack See administration instruction per 10mg dose pack    valACYclovir (VALTREX) 500 mg tablet TAKE 1 TABLET BY MOUTH TWICE DAILY FOR 7 DAYS        Objective:     Vitals:    12/08/22 1330   BP: 116/77   Pulse: 82   Resp: 16   Temp: 98.6 °F (37 °C)   TempSrc: Oral   SpO2: 96%   Weight: 173 lb (78.5 kg)   Height: 5' 5\" (1.651 m)     Physical Examination: General appearance - alert, well appearing, and in no distress  Eyes - pupils equal and reactive, extraocular eye movements intact  Ears - bilateral TM's and external ear canals normal  Nose - mucosal congestion, mucosal erythema, clear rhinorrhea, and sinuses normal and nontender  Mouth - mucous membranes moist, pharynx normal without lesions  Neck - supple, no significant adenopathy  Chest - clear to auscultation, no wheezes, rales or rhonchi, symmetric air entry, no tachypnea, retractions or cyanosis  Heart - normal rate, regular rhythm, normal S1, S2  Extremities - peripheral pulses normal, no ankle edema, no clubbing or cyanosis    Assessment/ Plan:   Diagnoses and all orders for this visit:    1. Bronchitis  -     albuterol (PROVENTIL HFA, VENTOLIN HFA, PROAIR HFA) 90 mcg/actuation inhaler; Take 1 Puff by inhalation every four (4) hours as needed for Wheezing.  -     predniSONE (STERAPRED DS) 10 mg dose pack; See administration instruction per 10mg dose pack  Reassured pt that cough is the sx that can linger the longest. Abx should have cleared infection if bacterial in origin. Recommended she complete pred taper as directed and try using albuterol inhaler as directed. Reviewed SEs/ADRs of medications. Push fluids, rest, continue other OTCs for symptom management. Follow up if sx persist or worsen. 2. Malaise and fatigue  -     AMB POC KIHSA INFLUENZA A/B TEST  -     CBC WITH AUTOMATED DIFF; Future  -     TSH 3RD GENERATION; Future  Negative flu. 3. Abnormal chest x-ray  -     XR CHEST PA LAT; Future  Recommended pt repeat a chest xray for further evaluation.  Will notify results and deviate plan based on findings. 4. Hypovitaminosis D  -     VITAMIN D, 25 HYDROXY; Future  Will notify results and deviate plan based on findings. I have discussed the diagnosis with the patient and the intended plan as seen in the above orders. The patient has received an after-visit summary and questions were answered concerning future plans. Medication Side Effects and Warnings were discussed with patient: yes  Patient Labs were reviewed and or requested: yes  Patient Past Records were reviewed and or requested  yes  Patient / Caregiver Understanding of treatment plan was verbalized during office visit YES    IVONNE Lopez    There are no Patient Instructions on file for this visit.

## 2022-12-08 NOTE — PROGRESS NOTES
Chief Complaint   Patient presents with    Cough    Fatigue       1. Patient in office today for follow up from urgent care. Pt was seen at [facility] on 11/27 due to cough. Xray was completed-pt stated was advised a shadowy image on xray but UC did not further investigate. Pt was dx with bronchitis. Prescribed doxycyline and tessalon. Pt did report improvement of sx while on abx. Cough and fatigue is still present. Productive,secretions are yellow and have thinned in consistency. Not treating with otc products. 1. Have you been to the ER, urgent care clinic since your last visit? Hospitalized since your last visit? No    2. Have you seen or consulted any other health care providers outside of the [address] [place] [address] since your last visit? Include any pap smears or colon screening.  No

## 2022-12-09 ENCOUNTER — HOSPITAL ENCOUNTER (OUTPATIENT)
Dept: GENERAL RADIOLOGY | Age: 72
End: 2022-12-09
Payer: MEDICARE

## 2022-12-09 DIAGNOSIS — R93.89 ABNORMAL CHEST X-RAY: ICD-10-CM

## 2022-12-09 LAB
25(OH)D3 SERPL-MCNC: 22.5 NG/ML (ref 30–100)
BASOPHILS # BLD: 0.1 K/UL (ref 0–0.1)
BASOPHILS NFR BLD: 1 % (ref 0–1)
DIFFERENTIAL METHOD BLD: ABNORMAL
EOSINOPHIL # BLD: 0.2 K/UL (ref 0–0.4)
EOSINOPHIL NFR BLD: 2 % (ref 0–7)
ERYTHROCYTE [DISTWIDTH] IN BLOOD BY AUTOMATED COUNT: 13.2 % (ref 11.5–14.5)
HCT VFR BLD AUTO: 42.7 % (ref 35–47)
HGB BLD-MCNC: 13.5 G/DL (ref 11.5–16)
IMM GRANULOCYTES # BLD AUTO: 0 K/UL (ref 0–0.04)
IMM GRANULOCYTES NFR BLD AUTO: 0 % (ref 0–0.5)
LYMPHOCYTES # BLD: 3.9 K/UL (ref 0.8–3.5)
LYMPHOCYTES NFR BLD: 40 % (ref 12–49)
MCH RBC QN AUTO: 28 PG (ref 26–34)
MCHC RBC AUTO-ENTMCNC: 31.6 G/DL (ref 30–36.5)
MCV RBC AUTO: 88.4 FL (ref 80–99)
MONOCYTES # BLD: 0.5 K/UL (ref 0–1)
MONOCYTES NFR BLD: 5 % (ref 5–13)
NEUTS SEG # BLD: 5.2 K/UL (ref 1.8–8)
NEUTS SEG NFR BLD: 52 % (ref 32–75)
NRBC # BLD: 0 K/UL (ref 0–0.01)
NRBC BLD-RTO: 0 PER 100 WBC
PLATELET # BLD AUTO: 323 K/UL (ref 150–400)
PMV BLD AUTO: 9.8 FL (ref 8.9–12.9)
RBC # BLD AUTO: 4.83 M/UL (ref 3.8–5.2)
TSH SERPL DL<=0.05 MIU/L-ACNC: 1.49 UIU/ML (ref 0.36–3.74)
WBC # BLD AUTO: 9.8 K/UL (ref 3.6–11)

## 2022-12-09 NOTE — PROGRESS NOTES
Please notify pt that her chest xray looks normal. I recommend she continue with the plan discussed during OV and reach out if any symptoms persist or worsen.

## 2022-12-09 NOTE — PROGRESS NOTES
Please notify pt the followin. Normal thyroid function. 2. Vitamin D levels is low. I recommend she take 2000 units of vitamin D OTC daily for this. 3. CBC normal. WBC count has normalized. Enc to continue with plan discussed during OV.

## 2022-12-12 ENCOUNTER — OFFICE VISIT (OUTPATIENT)
Dept: CARDIOLOGY CLINIC | Age: 72
End: 2022-12-12
Payer: MEDICARE

## 2022-12-12 DIAGNOSIS — Z95.818 STATUS POST PLACEMENT OF IMPLANTABLE LOOP RECORDER: Primary | ICD-10-CM

## 2023-01-16 ENCOUNTER — OFFICE VISIT (OUTPATIENT)
Dept: CARDIOLOGY CLINIC | Age: 73
End: 2023-01-16
Payer: MEDICARE

## 2023-01-16 DIAGNOSIS — Z95.818 STATUS POST PLACEMENT OF IMPLANTABLE LOOP RECORDER: Primary | ICD-10-CM

## 2023-02-08 RX ORDER — ATORVASTATIN CALCIUM 40 MG/1
TABLET, FILM COATED ORAL
Qty: 90 TABLET | Refills: 0 | Status: SHIPPED | OUTPATIENT
Start: 2023-02-08

## 2023-02-20 ENCOUNTER — OFFICE VISIT (OUTPATIENT)
Dept: CARDIOLOGY CLINIC | Age: 73
End: 2023-02-20
Payer: MEDICARE

## 2023-02-20 DIAGNOSIS — Z95.818 STATUS POST PLACEMENT OF IMPLANTABLE LOOP RECORDER: Primary | ICD-10-CM

## 2023-02-20 PROCEDURE — 93298 REM INTERROG DEV EVAL SCRMS: CPT | Performed by: INTERNAL MEDICINE

## 2023-03-16 RX ORDER — CLOPIDOGREL BISULFATE 75 MG/1
TABLET ORAL
Qty: 90 TABLET | Refills: 0 | Status: SHIPPED | OUTPATIENT
Start: 2023-03-16

## 2023-03-16 RX ORDER — VALACYCLOVIR HYDROCHLORIDE 500 MG/1
TABLET, FILM COATED ORAL
Qty: 60 TABLET | Refills: 2 | Status: SHIPPED | OUTPATIENT
Start: 2023-03-16

## 2023-05-08 RX ORDER — ATORVASTATIN CALCIUM 40 MG/1
TABLET, FILM COATED ORAL
Qty: 90 TABLET | Refills: 1 | Status: SHIPPED | OUTPATIENT
Start: 2023-05-08

## 2023-05-18 RX ORDER — DILTIAZEM HYDROCHLORIDE 120 MG/1
120 CAPSULE, EXTENDED RELEASE ORAL DAILY
COMMUNITY
Start: 2022-12-08 | End: 2023-06-07 | Stop reason: SDUPTHER

## 2023-05-18 RX ORDER — PREDNISONE 10 MG/1
TABLET ORAL
COMMUNITY
Start: 2022-12-08

## 2023-05-18 RX ORDER — CLOPIDOGREL BISULFATE 75 MG/1
1 TABLET ORAL NIGHTLY
COMMUNITY
Start: 2023-03-16 | End: 2023-06-07

## 2023-05-18 RX ORDER — CHLORHEXIDINE GLUCONATE 0.12 MG/ML
15 RINSE ORAL EVERY 12 HOURS
COMMUNITY
Start: 2020-11-02

## 2023-05-18 RX ORDER — VALACYCLOVIR HYDROCHLORIDE 500 MG/1
TABLET, FILM COATED ORAL
COMMUNITY
Start: 2023-03-16

## 2023-05-18 RX ORDER — ALBUTEROL SULFATE 90 UG/1
1 AEROSOL, METERED RESPIRATORY (INHALATION) EVERY 4 HOURS PRN
COMMUNITY
Start: 2022-12-08

## 2023-05-18 RX ORDER — ASPIRIN 81 MG/1
81 TABLET, CHEWABLE ORAL DAILY
COMMUNITY
Start: 2019-10-16

## 2023-05-18 RX ORDER — NITROFURANTOIN MACROCRYSTALS 50 MG/1
50 CAPSULE ORAL 4 TIMES DAILY
COMMUNITY

## 2023-06-07 RX ORDER — DILTIAZEM HYDROCHLORIDE 120 MG/1
120 CAPSULE, EXTENDED RELEASE ORAL DAILY
Qty: 90 CAPSULE | Refills: 3 | Status: SHIPPED | OUTPATIENT
Start: 2023-06-07

## 2023-06-07 RX ORDER — CLOPIDOGREL BISULFATE 75 MG/1
TABLET ORAL
Qty: 90 TABLET | Refills: 3 | Status: SHIPPED | OUTPATIENT
Start: 2023-06-07

## 2023-06-07 NOTE — TELEPHONE ENCOUNTER
[facility] needs a refill of dilTIAZem (TIAZAC) 120 MG extended release capsule. They have 0 pills/supply left and are requesting a 90 day supply with refills. Pharmacy has been updated in the chart. Patient was advised or scheduled an appointment for the future and to request refills thru the [facility] or by requesting a refill from their pharmacy in the future. Patient was also advised to check with their pharmacy for status of when refills are available.

## 2023-07-24 ENCOUNTER — CLINICAL DOCUMENTATION (OUTPATIENT)
Age: 73
End: 2023-07-24

## 2023-07-25 NOTE — TELEPHONE ENCOUNTER
Called patient, ID verified using two patient identifiers. Advised patient that per Dr. Nikolas Dockery he would like her to increase her Diltiazem to 240 mg daily due to episodes of fast HR's noted on her ILR reports. Patient states she knows exactly why she was having elevated HR's. She says from 10/30-11/2 she was going thru a very stressful time. She is moving and will have her remote box reconnected by this Saturday 11/14/20. She prefers not to increase Diltiazem at this time, she would like to wait and continue to monitor HR. If episodes of tachycardia continue we will notify her and she will increase medication as recommended. I will relay information to Dr. Nikolas Dockery. [facility] - ORTHOPEDICS  [facility]   [facility]  [phone]       Name: Silvia Magallon   MRN: [id number]  Date: 7/24/2023     REASON FOR VISIT: Follow up for left knee pain. INTERVAL HISTORY:  Mauricio Hylton is a 14year old female who returns for evaluation of chronic left knee pain. To summarize she has had this pain ongoing since April 2022. She was evaluated on February 8, 2023 and was provided with a left knee steroid injection. She had minimal improvement and now describes pain all around her knee. Her pain is sharp and constant and she notes it to be a 7 out of 10 and notes less than 50% of normal function. She has completed several months of physical therapy with minimal improvement. PE:   There were no vitals filed for this visit. There is no height or weight on file to calculate BMI. Physical Exam  Constitutional:       Appearance: Normal appearance. HENT:      Head: Normocephalic and atraumatic. Eyes:      Extraocular Movements: Extraocular movements intact. Neck:      Musculoskeletal: Normal range of motion and neck supple. Cardiovascular:      Pulses: Normal pulses. Pulmonary:      Effort: Pulmonary effort is normal. No respiratory distress. Abdominal:      General: There is no distension. Skin:     General: Skin is warm. Capillary Refill: Capillary refill takes less than 2 seconds. Findings: No bruising. Neurological:      General: No focal deficit present. Mental Status: She is alert. Psychiatric:         Mood and Affect: Mood normal.     Examination of the left knee demonstrates: On physical examination the patient is alert and oriented x 3, well developed well nourished and in no acute distress. The patient achieves  full and symmetric ROM. No effusion. Trace discomfort and tenderness at lateral joint line. Equivocal Sheryl/Thessally.  Stable ligamentous exam. Stable to varus and valgus stress examination. The contralateral knee is without limitation in range of motion or strength, no positive provocative maneuvers. Radiographic Examination/Diagnostics:    I personally viewed, independently interpreted and radiology report was reviewed. XR of the left knee reviewed from outside uploaded imaging demonstrates well-positioned and osseous structures without evidence of fracture or dislocation. Growth plates are open. IMPRESSION: Marixa Mccollum is a 14year old female who presented for follow up of chronic left knee pain. This has not been responsive to extensive physical therapy greater than 3 months of dedicated physician directed rehabilitation as well as corticosteroid intra-articular injection. She merits further investigation with MRI scan of the left knee using 3T high resolution scanner. I also discussed the merits of referral to rheumatology. PLAN:   We had a detailed discussion outlining the etiology, anatomy, pathophysiology, and natural history of the patient's findings. We reviewed the treatment of this disease condition     I elected to order an MRI scan of the left knee to further characterize any possible pathology and also discussed the role of referral to rheumatology. FOLLOW-UP:  I will contact patient after completion of MRI. Mk Figueredo. Parrish Mccarthy MD  Knee, Shoulder, & Elbow Surgery / Sports Medicine Specialist  [facility]  [address], [address]   Suzan Gaona. [email or web address]  t: [phone]  o: [phone]  f: [phone]

## 2023-08-22 RX ORDER — ATORVASTATIN CALCIUM 40 MG/1
TABLET, FILM COATED ORAL
Qty: 90 TABLET | Refills: 1 | Status: SHIPPED | OUTPATIENT
Start: 2023-08-22

## 2023-09-11 NOTE — PROGRESS NOTES
0700- Bedside and Verbal shift change report given to JUJU Dumont (oncoming nurse) by Fozia Parmar (offgoing nurse). Report included the following information SBAR, Kardex, Intake/Output, MAR, Accordion, Recent Results and Med Rec Status. 1800- Reviewed discharge instructions with patient. All questions answered. Patient discharged home with daughter. Proquad(MMR & Eyal) given left subcutaneous and Kinrix(Dtap & IPV) given right Delt. Pt ambled normally, left in stable condition.

## 2023-11-07 ENCOUNTER — OFFICE VISIT (OUTPATIENT)
Age: 73
End: 2023-11-07
Payer: MEDICARE

## 2023-11-07 VITALS
WEIGHT: 176 LBS | HEART RATE: 80 BPM | SYSTOLIC BLOOD PRESSURE: 139 MMHG | RESPIRATION RATE: 18 BRPM | BODY MASS INDEX: 29.32 KG/M2 | HEIGHT: 65 IN | TEMPERATURE: 98.3 F | DIASTOLIC BLOOD PRESSURE: 76 MMHG | OXYGEN SATURATION: 95 %

## 2023-11-07 DIAGNOSIS — I63.9 CEREBROVASCULAR ACCIDENT (CVA), UNSPECIFIED MECHANISM (HCC): ICD-10-CM

## 2023-11-07 DIAGNOSIS — R73.9 ELEVATED BLOOD SUGAR: ICD-10-CM

## 2023-11-07 DIAGNOSIS — I10 ESSENTIAL HYPERTENSION: Primary | ICD-10-CM

## 2023-11-07 DIAGNOSIS — R56.9 SEIZURE (HCC): ICD-10-CM

## 2023-11-07 DIAGNOSIS — E78.5 HYPERLIPIDEMIA LDL GOAL <70: ICD-10-CM

## 2023-11-07 PROCEDURE — 99214 OFFICE O/P EST MOD 30 MIN: CPT | Performed by: FAMILY MEDICINE

## 2023-11-07 PROCEDURE — G8399 PT W/DXA RESULTS DOCUMENT: HCPCS | Performed by: FAMILY MEDICINE

## 2023-11-07 PROCEDURE — G8484 FLU IMMUNIZE NO ADMIN: HCPCS | Performed by: FAMILY MEDICINE

## 2023-11-07 PROCEDURE — 3017F COLORECTAL CA SCREEN DOC REV: CPT | Performed by: FAMILY MEDICINE

## 2023-11-07 PROCEDURE — 1090F PRES/ABSN URINE INCON ASSESS: CPT | Performed by: FAMILY MEDICINE

## 2023-11-07 PROCEDURE — G8419 CALC BMI OUT NRM PARAM NOF/U: HCPCS | Performed by: FAMILY MEDICINE

## 2023-11-07 PROCEDURE — 3078F DIAST BP <80 MM HG: CPT | Performed by: FAMILY MEDICINE

## 2023-11-07 PROCEDURE — G8427 DOCREV CUR MEDS BY ELIG CLIN: HCPCS | Performed by: FAMILY MEDICINE

## 2023-11-07 PROCEDURE — 1036F TOBACCO NON-USER: CPT | Performed by: FAMILY MEDICINE

## 2023-11-07 PROCEDURE — 1123F ACP DISCUSS/DSCN MKR DOCD: CPT | Performed by: FAMILY MEDICINE

## 2023-11-07 PROCEDURE — 3075F SYST BP GE 130 - 139MM HG: CPT | Performed by: FAMILY MEDICINE

## 2023-11-07 RX ORDER — ATORVASTATIN CALCIUM 40 MG/1
40 TABLET, FILM COATED ORAL NIGHTLY
Qty: 90 TABLET | Refills: 1 | Status: SHIPPED | OUTPATIENT
Start: 2023-11-07

## 2023-11-07 RX ORDER — CHLORHEXIDINE GLUCONATE ORAL RINSE 1.2 MG/ML
15 SOLUTION DENTAL EVERY 12 HOURS
Qty: 473 ML | Refills: 3 | Status: SHIPPED | OUTPATIENT
Start: 2023-11-07

## 2023-11-07 ASSESSMENT — PATIENT HEALTH QUESTIONNAIRE - PHQ9
SUM OF ALL RESPONSES TO PHQ QUESTIONS 1-9: 0
1. LITTLE INTEREST OR PLEASURE IN DOING THINGS: 0
SUM OF ALL RESPONSES TO PHQ QUESTIONS 1-9: 0
2. FEELING DOWN, DEPRESSED OR HOPELESS: 0
SUM OF ALL RESPONSES TO PHQ QUESTIONS 1-9: 0
SUM OF ALL RESPONSES TO PHQ9 QUESTIONS 1 & 2: 0
SUM OF ALL RESPONSES TO PHQ QUESTIONS 1-9: 0

## 2023-11-07 NOTE — PROGRESS NOTES
Patient here for 6 month f/u for htn, fasting labs. 1. Have you been to the ER, urgent care clinic since your last visit? Hospitalized since your last visit? No    2. Have you seen or consulted any other health care providers outside of the [address] [place] [address] Avenue since your last visit? Include any pap smears or colon screening.  No

## 2023-11-07 NOTE — PROGRESS NOTES
Patient here for 6 month f/u for htn, fasting labs. 1. Have you been to the ER, urgent care clinic since your last visit? Hospitalized since your last visit? No    2. Have you seen or consulted any other health care providers outside of the [address] [place] [address] since your last visit? Include any pap smears or colon screening. No        Chief Complaint   Patient presents with    Hypertension     Fasting labs     She is a 67 y.o. female who presents for evalution. Reviewed PmHx, RxHx, FmHx, SocHx, AllgHx and updated and dated in the chart. CURRENT MEDS W/ ASSOC DIAG           Start Date End Date     aspirin 81 MG chewable tablet  10/16/19  --     Associated Diagnoses:  --     atorvastatin (LIPITOR) 40 MG tablet  11/07/23  --     Take 1 tablet by mouth nightly     Associated Diagnoses:  Hyperlipidemia LDL goal <70     chlorhexidine (PERIDEX) 0.12 % solution  11/07/23  --     Swish and spit 15 mLs  in the morning and 15 mLs in the evening.      Associated Diagnoses:  --     clopidogrel (PLAVIX) 75 MG tablet  06/07/23  --     TAKE ONE TABLET BY MOUTH ONCE NIGHTLY     Associated Diagnoses:  --     diclofenac sodium (VOLTAREN) 1 % GEL  11/07/23  --     Apply 4 g topically 4 times daily     Associated Diagnoses:  --     dilTIAZem (TIAZAC) 120 MG extended release capsule  06/07/23  --     Take 1 capsule by mouth daily     Associated Diagnoses:  --     nitrofurantoin (MACRODANTIN) 50 MG capsule  --  --     Associated Diagnoses:  --     valACYclovir (VALTREX) 500 MG tablet  03/16/23  --     Associated Diagnoses:  --             Patient Active Problem List   Diagnosis Code    Vertigo R42    Hyperlipidemia LDL goal <70 E78.5    Seizure (HCC) R56.9    TIA (transient ischemic attack) G45.9    CVA (cerebral vascular accident) ([address]) I63.9    Essential hypertension I10    Elevated blood sugar R73.9        Review of Systems - negative except as listed above in the HPI    Objective:     Vitals:    11/07/23 1027   BP:

## 2023-11-10 ENCOUNTER — CLINICAL DOCUMENTATION (OUTPATIENT)
Age: 73
End: 2023-11-10

## 2023-11-11 LAB
ALBUMIN SERPL-MCNC: 4 G/DL (ref 3.5–5)
ALBUMIN/GLOB SERPL: 1.4 (ref 1.1–2.2)
ALP SERPL-CCNC: 124 U/L (ref 45–117)
ALT SERPL-CCNC: 33 U/L (ref 12–78)
ANION GAP SERPL CALC-SCNC: 4 MMOL/L (ref 5–15)
AST SERPL-CCNC: 18 U/L (ref 15–37)
BILIRUB SERPL-MCNC: 0.5 MG/DL (ref 0.2–1)
BUN SERPL-MCNC: 16 MG/DL (ref 6–20)
BUN/CREAT SERPL: 17 (ref 12–20)
CALCIUM SERPL-MCNC: 9 MG/DL (ref 8.5–10.1)
CHLORIDE SERPL-SCNC: 109 MMOL/L (ref 97–108)
CHOLEST SERPL-MCNC: 164 MG/DL
CO2 SERPL-SCNC: 27 MMOL/L (ref 21–32)
CREAT SERPL-MCNC: 0.95 MG/DL (ref 0.55–1.02)
EST. AVERAGE GLUCOSE BLD GHB EST-MCNC: 120 MG/DL
GLOBULIN SER CALC-MCNC: 2.8 G/DL (ref 2–4)
GLUCOSE SERPL-MCNC: 130 MG/DL (ref 65–100)
HBA1C MFR BLD: 5.8 % (ref 4–5.6)
HDLC SERPL-MCNC: 48 MG/DL
HDLC SERPL: 3.4 (ref 0–5)
LDLC SERPL CALC-MCNC: 63.2 MG/DL (ref 0–100)
POTASSIUM SERPL-SCNC: 4.4 MMOL/L (ref 3.5–5.1)
PROT SERPL-MCNC: 6.8 G/DL (ref 6.4–8.2)
SODIUM SERPL-SCNC: 140 MMOL/L (ref 136–145)
TRIGL SERPL-MCNC: 264 MG/DL
VLDLC SERPL CALC-MCNC: 52.8 MG/DL

## 2024-01-04 ENCOUNTER — HOSPITAL ENCOUNTER (OUTPATIENT)
Facility: HOSPITAL | Age: 74
Discharge: HOME | End: 2024-01-04
Payer: MEDICARE

## 2024-01-04 ENCOUNTER — OFFICE VISIT (OUTPATIENT)
Age: 74
End: 2024-01-04
Payer: MEDICARE

## 2024-01-04 VITALS
HEIGHT: 65 IN | RESPIRATION RATE: 20 BRPM | DIASTOLIC BLOOD PRESSURE: 85 MMHG | SYSTOLIC BLOOD PRESSURE: 138 MMHG | TEMPERATURE: 98.2 F | BODY MASS INDEX: 29.34 KG/M2 | WEIGHT: 176.1 LBS | OXYGEN SATURATION: 98 % | HEART RATE: 87 BPM

## 2024-01-04 DIAGNOSIS — R05.1 ACUTE COUGH: ICD-10-CM

## 2024-01-04 DIAGNOSIS — M79.10 MYALGIA: ICD-10-CM

## 2024-01-04 DIAGNOSIS — R05.1 ACUTE COUGH: Primary | ICD-10-CM

## 2024-01-04 DIAGNOSIS — R56.9 SEIZURE (HCC): ICD-10-CM

## 2024-01-04 LAB
EXP DATE SOLUTION: NORMAL
EXP DATE SWAB: NORMAL
EXPIRATION DATE: NORMAL
INFLUENZA A ANTIGEN, POC: NEGATIVE
INFLUENZA B ANTIGEN, POC: NEGATIVE
LOT NUMBER POC: NORMAL
LOT NUMBER SOLUTION: NORMAL
LOT NUMBER SWAB: NORMAL
SARS-COV-2 RNA, POC: NEGATIVE
VALID INTERNAL CONTROL, POC: NORMAL

## 2024-01-04 PROCEDURE — 1123F ACP DISCUSS/DSCN MKR DOCD: CPT | Performed by: PHYSICIAN ASSISTANT

## 2024-01-04 PROCEDURE — 3017F COLORECTAL CA SCREEN DOC REV: CPT | Performed by: PHYSICIAN ASSISTANT

## 2024-01-04 PROCEDURE — G8484 FLU IMMUNIZE NO ADMIN: HCPCS | Performed by: PHYSICIAN ASSISTANT

## 2024-01-04 PROCEDURE — PBSHW AMB POC COVID-19 COV: Performed by: PHYSICIAN ASSISTANT

## 2024-01-04 PROCEDURE — G8399 PT W/DXA RESULTS DOCUMENT: HCPCS | Performed by: PHYSICIAN ASSISTANT

## 2024-01-04 PROCEDURE — 71046 X-RAY EXAM CHEST 2 VIEWS: CPT

## 2024-01-04 PROCEDURE — 1090F PRES/ABSN URINE INCON ASSESS: CPT | Performed by: PHYSICIAN ASSISTANT

## 2024-01-04 PROCEDURE — PBSHW AMB POC INFLUENZA A  AND B REAL-TIME RT-PCR: Performed by: PHYSICIAN ASSISTANT

## 2024-01-04 PROCEDURE — 1036F TOBACCO NON-USER: CPT | Performed by: PHYSICIAN ASSISTANT

## 2024-01-04 PROCEDURE — 3075F SYST BP GE 130 - 139MM HG: CPT | Performed by: PHYSICIAN ASSISTANT

## 2024-01-04 PROCEDURE — 3079F DIAST BP 80-89 MM HG: CPT | Performed by: PHYSICIAN ASSISTANT

## 2024-01-04 PROCEDURE — G8419 CALC BMI OUT NRM PARAM NOF/U: HCPCS | Performed by: PHYSICIAN ASSISTANT

## 2024-01-04 PROCEDURE — 87502 INFLUENZA DNA AMP PROBE: CPT | Performed by: PHYSICIAN ASSISTANT

## 2024-01-04 PROCEDURE — 99213 OFFICE O/P EST LOW 20 MIN: CPT | Performed by: PHYSICIAN ASSISTANT

## 2024-01-04 PROCEDURE — 87635 SARS-COV-2 COVID-19 AMP PRB: CPT | Performed by: PHYSICIAN ASSISTANT

## 2024-01-04 PROCEDURE — G8427 DOCREV CUR MEDS BY ELIG CLIN: HCPCS | Performed by: PHYSICIAN ASSISTANT

## 2024-01-04 RX ORDER — DOXYCYCLINE HYCLATE 100 MG/1
100 CAPSULE ORAL 2 TIMES DAILY
COMMUNITY
Start: 2024-01-02

## 2024-01-04 RX ORDER — METHYLPREDNISOLONE 4 MG/1
2 TABLET ORAL DAILY
COMMUNITY

## 2024-01-04 RX ORDER — BENZONATATE 100 MG/1
100 CAPSULE ORAL 3 TIMES DAILY PRN
COMMUNITY
Start: 2024-01-02

## 2024-01-04 SDOH — ECONOMIC STABILITY: FOOD INSECURITY: WITHIN THE PAST 12 MONTHS, THE FOOD YOU BOUGHT JUST DIDN'T LAST AND YOU DIDN'T HAVE MONEY TO GET MORE.: NEVER TRUE

## 2024-01-04 SDOH — ECONOMIC STABILITY: INCOME INSECURITY: HOW HARD IS IT FOR YOU TO PAY FOR THE VERY BASICS LIKE FOOD, HOUSING, MEDICAL CARE, AND HEATING?: NOT HARD AT ALL

## 2024-01-04 SDOH — ECONOMIC STABILITY: FOOD INSECURITY: WITHIN THE PAST 12 MONTHS, YOU WORRIED THAT YOUR FOOD WOULD RUN OUT BEFORE YOU GOT MONEY TO BUY MORE.: NEVER TRUE

## 2024-01-04 SDOH — ECONOMIC STABILITY: HOUSING INSECURITY
IN THE LAST 12 MONTHS, WAS THERE A TIME WHEN YOU DID NOT HAVE A STEADY PLACE TO SLEEP OR SLEPT IN A SHELTER (INCLUDING NOW)?: NO

## 2024-01-04 ASSESSMENT — PATIENT HEALTH QUESTIONNAIRE - PHQ9
SUM OF ALL RESPONSES TO PHQ9 QUESTIONS 1 & 2: 0
SUM OF ALL RESPONSES TO PHQ QUESTIONS 1-9: 0
SUM OF ALL RESPONSES TO PHQ QUESTIONS 1-9: 0
1. LITTLE INTEREST OR PLEASURE IN DOING THINGS: 0
2. FEELING DOWN, DEPRESSED OR HOPELESS: 0
SUM OF ALL RESPONSES TO PHQ QUESTIONS 1-9: 0
SUM OF ALL RESPONSES TO PHQ QUESTIONS 1-9: 0

## 2024-01-04 NOTE — PROGRESS NOTES
Floridalma Gonzalez is a 73 y.o. female , id x 2(name and ). Reviewed record, history, and  medications.    Chief Complaint   Patient presents with    URI     Patient c/o wheezing, coughing, achy, hoarse, runny eyes and nose and vomiting this morning. Sick for about 1 week, no exposure noted. Patient  on Tuesday for URI, medrol brandon, doxycycline and tessalon given, not finished yet.   Covid test done, no results yet.    Vitals:    24 0920   BP: 138/85   Site: Left Upper Arm   Position: Sitting   Cuff Size: Large Adult   Pulse: 87   Resp: 20   Temp: 98.2 °F (36.8 °C)   TempSrc: Oral   SpO2: 98%   Weight: 79.9 kg (176 lb 1.6 oz)   Height: 1.651 m (5' 5\")       Coordination of Care Questionnaire:   1. Have you been to the ER, urgent care clinic since your last visit?  Hospitalized since your last visit?Yes Patient ,     2. Have you seen or consulted any other health care providers outside of the [facility] since your last visit?  Include any pap smears or colon screening. No          2024     9:25 AM   PHQ-9    Little interest or pleasure in doing things 0   Feeling down, depressed, or hopeless 0   PHQ-2 Score 0   PHQ-9 Total Score 0            No data to display                  Social Determinants of Health     Tobacco Use: Low Risk  (2024)    Patient History     Smoking Tobacco Use: Never     Smokeless Tobacco Use: Never     Passive Exposure: Not on file   Alcohol Use: Not on file   Financial Resource Strain: Low Risk  (2024)    Overall Financial Resource Strain (CARDIA)     Difficulty of Paying Living Expenses: Not hard at all   Food Insecurity: No Food Insecurity (2024)    Hunger Vital Sign     Worried About Running Out of Food in the Last Year: Never true     Ran Out of Food in the Last Year: Never true   Transportation Needs: Unknown (2024)    PRAPARE - Transportation     Lack of Transportation (Medical): Not on file     Lack of

## 2024-01-04 NOTE — PROGRESS NOTES
Floridalma Gonzalez is a 73 y.o. female , id x 2(name and ). Reviewed record, history, and  medications.    Chief Complaint   Patient presents with    URI     Patient c/o wheezing, coughing, achy, hoarse, runny eyes and nose and vomiting this morning. Sick for about 1 week, no exposure noted. Patient  on Tuesday for URI, medrol brandon, doxycycline and tessalon given, not finished yet.   Covid test done, no results yet.    Vitals:    24 0920   Weight: 79.9 kg (176 lb 1.6 oz)   Height: 1.651 m (5' 5\")       Coordination of Care Questionnaire:   1. Have you been to the ER, urgent care clinic since your last visit?  Hospitalized since your last visit?Yes Patient ,     2. Have you seen or consulted any other health care providers outside of the [facility] since your last visit?  Include any pap smears or colon screening. No          2024     9:25 AM   PHQ-9    Little interest or pleasure in doing things 0   Feeling down, depressed, or hopeless 0   PHQ-2 Score 0   PHQ-9 Total Score 0            No data to display                Social Determinants of Health     Tobacco Use: Low Risk  (2023)    Patient History     Smoking Tobacco Use: Never     Smokeless Tobacco Use: Never     Passive Exposure: Not on file   Alcohol Use: Not on file   Financial Resource Strain: Low Risk  (2024)    Overall Financial Resource Strain (CARDIA)     Difficulty of Paying Living Expenses: Not hard at all   Food Insecurity: No Food Insecurity (2024)    Hunger Vital Sign     Worried About Running Out of Food in the Last Year: Never true     Ran Out of Food in the Last Year: Never true   Transportation Needs: Unknown (2024)    PRAPARE - Transportation     Lack of Transportation (Medical): Not on file     Lack of Transportation (Non-Medical): No   Physical Activity: Not on file   Stress: Not on file   Social Connections: Not on file   Intimate Partner Violence: Not on file

## 2024-01-10 ENCOUNTER — TELEPHONE (OUTPATIENT)
Age: 74
End: 2024-01-10

## 2024-01-10 NOTE — TELEPHONE ENCOUNTER
Call transferred from call center. ID verified x 2. Per patient she has a ILR. She states she use to get a letter regarding ILR, how she was doing.    Advised patient, ILR at [facility] since 3/8/23.     Appointment has been scheduled with Dr. Ayala as she is overdue, discuss treatment options.

## 2024-01-15 ENCOUNTER — APPOINTMENT (OUTPATIENT)
Facility: HOSPITAL | Age: 74
End: 2024-01-15
Payer: MEDICARE

## 2024-01-15 ENCOUNTER — HOSPITAL ENCOUNTER (EMERGENCY)
Facility: HOSPITAL | Age: 74
Discharge: HOME | End: 2024-01-15
Attending: STUDENT IN AN ORGANIZED HEALTH CARE EDUCATION/TRAINING PROGRAM | Admitting: STUDENT IN AN ORGANIZED HEALTH CARE EDUCATION/TRAINING PROGRAM
Payer: MEDICARE

## 2024-01-15 VITALS
RESPIRATION RATE: 16 BRPM | SYSTOLIC BLOOD PRESSURE: 154 MMHG | TEMPERATURE: 98.7 F | OXYGEN SATURATION: 96 % | WEIGHT: 176 LBS | HEIGHT: 65 IN | BODY MASS INDEX: 29.32 KG/M2 | HEART RATE: 89 BPM | DIASTOLIC BLOOD PRESSURE: 63 MMHG

## 2024-01-15 DIAGNOSIS — R05.9 COUGH, UNSPECIFIED TYPE: Primary | ICD-10-CM

## 2024-01-15 LAB
ALBUMIN SERPL-MCNC: 3.2 G/DL (ref 3.5–5)
ALBUMIN/GLOB SERPL: 1 (ref 1.1–2.2)
ALP SERPL-CCNC: 108 U/L (ref 45–117)
ALT SERPL-CCNC: 34 U/L (ref 12–78)
ANION GAP SERPL CALC-SCNC: 7 MMOL/L (ref 5–15)
AST SERPL-CCNC: 18 U/L (ref 15–37)
BASOPHILS # BLD: 0.1 K/UL (ref 0–0.1)
BASOPHILS NFR BLD: 0 % (ref 0–1)
BILIRUB SERPL-MCNC: 0.4 MG/DL (ref 0.2–1)
BUN SERPL-MCNC: 19 MG/DL (ref 6–20)
BUN/CREAT SERPL: 21 (ref 12–20)
CALCIUM SERPL-MCNC: 8.4 MG/DL (ref 8.5–10.1)
CHLORIDE SERPL-SCNC: 106 MMOL/L (ref 97–108)
CO2 SERPL-SCNC: 27 MMOL/L (ref 21–32)
CREAT SERPL-MCNC: 0.89 MG/DL (ref 0.55–1.02)
DIFFERENTIAL METHOD BLD: ABNORMAL
EOSINOPHIL # BLD: 0.2 K/UL (ref 0–0.4)
EOSINOPHIL NFR BLD: 2 % (ref 0–7)
ERYTHROCYTE [DISTWIDTH] IN BLOOD BY AUTOMATED COUNT: 12.8 % (ref 11.5–14.5)
GLOBULIN SER CALC-MCNC: 3.1 G/DL (ref 2–4)
GLUCOSE SERPL-MCNC: 75 MG/DL (ref 65–100)
HCT VFR BLD AUTO: 38.7 % (ref 35–47)
HGB BLD-MCNC: 13.4 G/DL (ref 11.5–16)
IMM GRANULOCYTES # BLD AUTO: 0.1 K/UL (ref 0–0.04)
IMM GRANULOCYTES NFR BLD AUTO: 0 % (ref 0–0.5)
LYMPHOCYTES # BLD: 3.5 K/UL (ref 0.8–3.5)
LYMPHOCYTES NFR BLD: 30 % (ref 12–49)
MCH RBC QN AUTO: 29.6 PG (ref 26–34)
MCHC RBC AUTO-ENTMCNC: 34.6 G/DL (ref 30–36.5)
MCV RBC AUTO: 85.4 FL (ref 80–99)
MONOCYTES # BLD: 0.9 K/UL (ref 0–1)
MONOCYTES NFR BLD: 7 % (ref 5–13)
NEUTS SEG # BLD: 7.1 K/UL (ref 1.8–8)
NEUTS SEG NFR BLD: 61 % (ref 32–75)
NRBC # BLD: 0 K/UL (ref 0–0.01)
NRBC BLD-RTO: 0 PER 100 WBC
PLATELET # BLD AUTO: 296 K/UL (ref 150–400)
PMV BLD AUTO: 9.5 FL (ref 8.9–12.9)
POTASSIUM SERPL-SCNC: 4 MMOL/L (ref 3.5–5.1)
PROT SERPL-MCNC: 6.3 G/DL (ref 6.4–8.2)
RBC # BLD AUTO: 4.53 M/UL (ref 3.8–5.2)
SODIUM SERPL-SCNC: 140 MMOL/L (ref 136–145)
TROPONIN I SERPL HS-MCNC: <4 NG/L (ref 0–51)
WBC # BLD AUTO: 11.7 K/UL (ref 3.6–11)

## 2024-01-15 PROCEDURE — 71046 X-RAY EXAM CHEST 2 VIEWS: CPT

## 2024-01-15 PROCEDURE — 84484 ASSAY OF TROPONIN QUANT: CPT

## 2024-01-15 PROCEDURE — 80053 COMPREHEN METABOLIC PANEL: CPT

## 2024-01-15 PROCEDURE — 36415 COLL VENOUS BLD VENIPUNCTURE: CPT

## 2024-01-15 PROCEDURE — 85025 COMPLETE CBC W/AUTO DIFF WBC: CPT

## 2024-01-15 PROCEDURE — 99285 EMERGENCY DEPT VISIT HI MDM: CPT

## 2024-01-15 PROCEDURE — 93005 ELECTROCARDIOGRAM TRACING: CPT

## 2024-01-15 RX ORDER — DEXTROMETHORPHAN POLISTIREX 30 MG/5ML
60 SUSPENSION ORAL 2 TIMES DAILY PRN
Qty: 89 ML | Refills: 0 | Status: SHIPPED | OUTPATIENT
Start: 2024-01-15 | End: 2024-01-25

## 2024-01-15 RX ORDER — PREDNISONE 20 MG/1
40 TABLET ORAL DAILY
Qty: 10 TABLET | Refills: 0 | Status: SHIPPED | OUTPATIENT
Start: 2024-01-15 | End: 2024-01-20

## 2024-01-15 ASSESSMENT — PAIN SCALES - GENERAL: PAINLEVEL_OUTOF10: 6

## 2024-01-15 ASSESSMENT — PAIN DESCRIPTION - LOCATION: LOCATION: GENERALIZED

## 2024-01-15 ASSESSMENT — PAIN DESCRIPTION - DESCRIPTORS: DESCRIPTORS: ACHING

## 2024-01-15 NOTE — ED PROVIDER NOTES
[facility] EMERGENCY DEPT  EMERGENCY DEPARTMENT ENCOUNTER      Pt Name: Floridalma Gonzalez  MRN: [id number]  Birthdate 1950  Date of evaluation: 1/15/2024  Provider: Anayeli Lewis PA-C    CHIEF COMPLAINT       Chief Complaint   Patient presents with    Cough         HISTORY OF PRESENT ILLNESS   (Location/Symptom, Timing/Onset, Context/Setting, Quality, Duration, Modifying Factors, Severity)  Note limiting factors.   HPI    Floridalma Gonzalez is a 73 y.o. female with Hx of vertigo, seizure, TIA, CVA, HTN, CLL who presents ambulatory to [place] ED with cc of worsening cough x 2 weeks. Diagnosed with URI at [facility] - got medrol dose pack, doxy, tessalon perles. Saw PCP on 1/4 - negative flu, negative covid, negative CXR, recommended to continue medications from [facility]. Reports mild intermittent chest pain associated with cough; hoarse voice. Denies fever, SOB, sore throat, any other concerns.        PCP: Pancho Purvis MD    There are no other complaints, changes or physical findings at this time.    Review of External Medical Records:     Nursing Notes were reviewed.    REVIEW OF SYSTEMS    (2-9 systems for level 4, 10 or more for level 5)     Review of Systems   All other systems reviewed and are negative.      Except as noted above the remainder of the review of systems was reviewed and negative.       PAST MEDICAL HISTORY     Past Medical History:   Diagnosis Date    Cancer (HCC)     CLL (chronic lymphocytic leukemia) (HCC)     Coronary artery disease     Dyslipidemia     Neurological disorder     TIA (transient ischemic attack)     TIA (transient ischemic attack) 9/2015         SURGICAL HISTORY       Past Surgical History:   Procedure Laterality Date    BREAST SURGERY      GYN  2004    DNC    INSERTION SUBQ CARDIAC RHYTHM MONITOR W/PRGRMG N/A 12/2/2019    LOOP RECORDER INSERT performed by Torrey Johnson MD at [facility] CARDIAC CATH LAB    OTHER SURGICAL HISTORY  1990    fibrodema (left

## 2024-01-15 NOTE — DISCHARGE INSTRUCTIONS
As discussed, your workup today is negative for any emergent findings. You are being given a stronger dose of steroids and a different cough syrup. Follow up with your PCP for further management. Return to the ER if you experience severe or worsening symptoms.

## 2024-01-15 NOTE — ED TRIAGE NOTES
GCS 15, calm and cooperative. Pt states she was at [facility] approx 2 weeks ago and diagnosed with a viral infection. Was prescribed doxycycline, prednisone, and cold medication. Sts she does not feel better after meds.

## 2024-01-17 LAB
EKG ATRIAL RATE: 85 BPM
EKG DIAGNOSIS: NORMAL
EKG P AXIS: 43 DEGREES
EKG P-R INTERVAL: 130 MS
EKG Q-T INTERVAL: 370 MS
EKG QRS DURATION: 78 MS
EKG QTC CALCULATION (BAZETT): 440 MS
EKG R AXIS: -12 DEGREES
EKG T AXIS: 73 DEGREES
EKG VENTRICULAR RATE: 85 BPM

## 2024-02-14 ENCOUNTER — OFFICE VISIT (OUTPATIENT)
Age: 74
End: 2024-02-14
Payer: MEDICARE

## 2024-02-14 VITALS
OXYGEN SATURATION: 94 % | WEIGHT: 173 LBS | SYSTOLIC BLOOD PRESSURE: 110 MMHG | HEIGHT: 65 IN | DIASTOLIC BLOOD PRESSURE: 60 MMHG | HEART RATE: 95 BPM | BODY MASS INDEX: 28.82 KG/M2

## 2024-02-14 DIAGNOSIS — I10 PRIMARY HYPERTENSION: ICD-10-CM

## 2024-02-14 DIAGNOSIS — Z98.890 HISTORY OF LOOP RECORDER: ICD-10-CM

## 2024-02-14 DIAGNOSIS — Z45.09 ENCOUNTER FOR LOOP RECORDER AT END OF BATTERY LIFE: Primary | ICD-10-CM

## 2024-02-14 DIAGNOSIS — Z86.39 H/O HYPERLIPIDEMIA: ICD-10-CM

## 2024-02-14 PROCEDURE — G8484 FLU IMMUNIZE NO ADMIN: HCPCS | Performed by: INTERNAL MEDICINE

## 2024-02-14 PROCEDURE — 3078F DIAST BP <80 MM HG: CPT | Performed by: INTERNAL MEDICINE

## 2024-02-14 PROCEDURE — 1036F TOBACCO NON-USER: CPT | Performed by: INTERNAL MEDICINE

## 2024-02-14 PROCEDURE — G8427 DOCREV CUR MEDS BY ELIG CLIN: HCPCS | Performed by: INTERNAL MEDICINE

## 2024-02-14 PROCEDURE — 3017F COLORECTAL CA SCREEN DOC REV: CPT | Performed by: INTERNAL MEDICINE

## 2024-02-14 PROCEDURE — 1090F PRES/ABSN URINE INCON ASSESS: CPT | Performed by: INTERNAL MEDICINE

## 2024-02-14 PROCEDURE — G8419 CALC BMI OUT NRM PARAM NOF/U: HCPCS | Performed by: INTERNAL MEDICINE

## 2024-02-14 PROCEDURE — G8399 PT W/DXA RESULTS DOCUMENT: HCPCS | Performed by: INTERNAL MEDICINE

## 2024-02-14 PROCEDURE — 3074F SYST BP LT 130 MM HG: CPT | Performed by: INTERNAL MEDICINE

## 2024-02-14 PROCEDURE — 99204 OFFICE O/P NEW MOD 45 MIN: CPT | Performed by: INTERNAL MEDICINE

## 2024-02-14 PROCEDURE — 1123F ACP DISCUSS/DSCN MKR DOCD: CPT | Performed by: INTERNAL MEDICINE
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
## 2024-02-14 NOTE — PATIENT INSTRUCTIONS
Your ILR EXPLANT procedure has been scheduled for 4/8/24 at 800 AM, at [facility].    Please report to second floor Admitting Department by 630 AM, or 1.5 hours prior to your scheduled procedure. Please bring a list of your current medications and medication bottles, if able, to the hospital on this day.  You will be able to drive after your procedure as you will only be receiving local anesthesia.    You will need labs drawn prior to your procedure. Please go to have this done no sooner than 3/8/24 and no later than 4/4/24.      You should NOT stop your medication prior to your scheduled procedure.    After your procedure, please contact the office if your incision shows any signs of infection, such as redness, warmth, drainage, increased pain, or swelling.  Please call the office with any questions.

## 2024-02-26 ENCOUNTER — OFFICE VISIT (OUTPATIENT)
Age: 74
End: 2024-02-26
Payer: MEDICARE

## 2024-02-26 VITALS
BODY MASS INDEX: 28.66 KG/M2 | HEART RATE: 99 BPM | TEMPERATURE: 98.1 F | DIASTOLIC BLOOD PRESSURE: 78 MMHG | OXYGEN SATURATION: 96 % | RESPIRATION RATE: 18 BRPM | WEIGHT: 172 LBS | SYSTOLIC BLOOD PRESSURE: 122 MMHG | HEIGHT: 65 IN

## 2024-02-26 DIAGNOSIS — E78.5 HYPERLIPIDEMIA LDL GOAL <70: ICD-10-CM

## 2024-02-26 DIAGNOSIS — I10 ESSENTIAL HYPERTENSION: ICD-10-CM

## 2024-02-26 DIAGNOSIS — B02.9 HERPES ZOSTER WITHOUT COMPLICATION: ICD-10-CM

## 2024-02-26 DIAGNOSIS — I63.9 CEREBROVASCULAR ACCIDENT (CVA), UNSPECIFIED MECHANISM (HCC): ICD-10-CM

## 2024-02-26 DIAGNOSIS — E55.9 VITAMIN D DEFICIENCY, UNSPECIFIED: ICD-10-CM

## 2024-02-26 DIAGNOSIS — R56.9 SEIZURE (HCC): ICD-10-CM

## 2024-02-26 DIAGNOSIS — K21.9 GASTROESOPHAGEAL REFLUX DISEASE WITHOUT ESOPHAGITIS: ICD-10-CM

## 2024-02-26 DIAGNOSIS — Z00.01 ENCOUNTER FOR MEDICARE ANNUAL EXAMINATION WITH ABNORMAL FINDINGS: Primary | ICD-10-CM

## 2024-02-26 DIAGNOSIS — R73.9 ELEVATED BLOOD SUGAR: ICD-10-CM

## 2024-02-26 PROCEDURE — 1090F PRES/ABSN URINE INCON ASSESS: CPT | Performed by: FAMILY MEDICINE

## 2024-02-26 PROCEDURE — G8419 CALC BMI OUT NRM PARAM NOF/U: HCPCS | Performed by: FAMILY MEDICINE

## 2024-02-26 PROCEDURE — 99214 OFFICE O/P EST MOD 30 MIN: CPT | Performed by: FAMILY MEDICINE

## 2024-02-26 PROCEDURE — G0439 PPPS, SUBSEQ VISIT: HCPCS | Performed by: FAMILY MEDICINE

## 2024-02-26 PROCEDURE — G8484 FLU IMMUNIZE NO ADMIN: HCPCS | Performed by: FAMILY MEDICINE

## 2024-02-26 PROCEDURE — G8399 PT W/DXA RESULTS DOCUMENT: HCPCS | Performed by: FAMILY MEDICINE

## 2024-02-26 PROCEDURE — 3074F SYST BP LT 130 MM HG: CPT | Performed by: FAMILY MEDICINE

## 2024-02-26 PROCEDURE — 1036F TOBACCO NON-USER: CPT | Performed by: FAMILY MEDICINE

## 2024-02-26 PROCEDURE — 1123F ACP DISCUSS/DSCN MKR DOCD: CPT | Performed by: FAMILY MEDICINE

## 2024-02-26 PROCEDURE — 3078F DIAST BP <80 MM HG: CPT | Performed by: FAMILY MEDICINE

## 2024-02-26 PROCEDURE — G8427 DOCREV CUR MEDS BY ELIG CLIN: HCPCS | Performed by: FAMILY MEDICINE

## 2024-02-26 PROCEDURE — 3017F COLORECTAL CA SCREEN DOC REV: CPT | Performed by: FAMILY MEDICINE

## 2024-02-26 RX ORDER — OMEGA-3S/DHA/EPA/FISH OIL/D3 300MG-1000
400 CAPSULE ORAL DAILY
COMMUNITY

## 2024-02-26 ASSESSMENT — PATIENT HEALTH QUESTIONNAIRE - PHQ9
SUM OF ALL RESPONSES TO PHQ QUESTIONS 1-9: 0
SUM OF ALL RESPONSES TO PHQ9 QUESTIONS 1 & 2: 0
SUM OF ALL RESPONSES TO PHQ QUESTIONS 1-9: 0
SUM OF ALL RESPONSES TO PHQ QUESTIONS 1-9: 0
1. LITTLE INTEREST OR PLEASURE IN DOING THINGS: 0
2. FEELING DOWN, DEPRESSED OR HOPELESS: 0
SUM OF ALL RESPONSES TO PHQ QUESTIONS 1-9: 0

## 2024-02-26 NOTE — PATIENT INSTRUCTIONS
alcohol can cause health problems.     Manage other health problems such as diabetes, high blood pressure, and high cholesterol. If you think you may have a problem with alcohol or drug use, talk to your doctor.   Medicines    Take your medicines exactly as prescribed. Call your doctor if you think you are having a problem with your medicine.     If your doctor recommends aspirin, take the amount directed each day. Make sure you take aspirin and not another kind of pain reliever, such as acetaminophen (Tylenol).   When should you call for help?   Call 911 if you have symptoms of a heart attack. These may include:    Chest pain or pressure, or a strange feeling in the chest.     Sweating.     Shortness of breath.     Pain, pressure, or a strange feeling in the back, neck, jaw, or upper belly or in one or both shoulders or arms.     Lightheadedness or sudden weakness.     A fast or irregular heartbeat.   After you call 911, the  may tell you to chew 1 adult-strength or 2 to 4 low-dose aspirin. Wait for an ambulance. Do not try to drive yourself.  Watch closely for changes in your health, and be sure to contact your doctor if you have any problems.  Where can you learn more?  Go to https://www.[employer].net/patientEd and enter F075 to learn more about \"A Healthy Heart: Care Instructions.\"  Current as of: June 25, 2023               Content Version: 13.9  © 3121-4905 [employer].   Care instructions adapted under license by [employer]. If you have questions about a medical condition or this instruction, always ask your healthcare professional. [employer] disclaims any warranty or liability for your use of this information.      Personalized Preventive Plan for Floridalma Gonzalez - 2/26/2024  Medicare offers a range of preventive health benefits. Some of the tests and screenings are paid in full while other may be subject to a deductible, co-insurance, and/or copay.    Some of

## 2024-02-26 NOTE — PROGRESS NOTES
\"Have you been to the ER, urgent care clinic since your last visit?  Hospitalized since your last visit?\"    NO    “Have you seen or consulted any other health care providers outside of [facility] since your last visit?”    NO    Chief Complaint   Patient presents with    Medicare AW    Medication Problem     Omeprazole questions    Lower Back Pain     X 1 month  6/10 intermittent pain     Patient states she is on week 6 of shingles left lower back.      Medicare Wellness Exam:    Chief Complaint   Patient presents with    Medicare AW    Medication Problem     Omeprazole questions    Lower Back Pain     X 1 month  6/10 intermittent pain     she is a 73 y.o. year old female who presents for evaluation for their Medicare Wellness Visit.    Fall Screen is completed and assessed=yes  Depression Screen is completed and assessed=yes  Medication list reviewed and adjusted for accuracy=yes  Immunizations reviewed and updated=yes  Health/Preventative Screenings reviewed and updated=yes  ADL Functions reviewed=yes    [unfilled]    Reviewed PmHx, RxHx, FmHx, SocHx, AllgHx and updated and dated in the chart.    Review of Systems - negative except as listed above in the HPI    Objective:     Vitals:    02/26/24 1403   BP: 122/78   Pulse: 99   Resp: 18   Temp: 98.1 °F (36.7 °C)   SpO2: 96%   Weight: 78 kg (172 lb)   Height: 1.651 m (5' 5\")       Assessment/ Plan:   Floridalma was seen today for medicare awv, medication problem and lower back pain.    Diagnoses and all orders for this visit:    Encounter for Medicare annual examination with abnormal findings  -     Hemoglobin A1C; Future  -     Comprehensive Metabolic Panel; Future  -     CBC with Auto Differential; Future  -     TSH; Future  -     Lipid Panel; Future  -     Vitamin D 25 Hydroxy; Future    Cerebrovascular accident (CVA), unspecified mechanism (HCC)  -     Comprehensive Metabolic Panel; Future  -     Lipid Panel; Future    Essential hypertension  -

## 2024-02-26 NOTE — PROGRESS NOTES
\"Have you been to the ER, urgent care clinic since your last visit?  Hospitalized since your last visit?\"    NO    “Have you seen or consulted any other health care providers outside of [facility] since your last visit?”    NO    Chief Complaint   Patient presents with    Medicare AW    Medication Problem     Omeprazole questions    Lower Back Pain     X 1 month  6/10 intermittent pain     Patient states she is on week 6 of shingles left lower back.

## 2024-02-27 LAB
25(OH)D3 SERPL-MCNC: 22.3 NG/ML (ref 30–100)
ALBUMIN SERPL-MCNC: 4 G/DL (ref 3.5–5)
ALBUMIN/GLOB SERPL: 1.6 (ref 1.1–2.2)
ALP SERPL-CCNC: 118 U/L (ref 45–117)
ALT SERPL-CCNC: 27 U/L (ref 12–78)
ANION GAP SERPL CALC-SCNC: 7 MMOL/L (ref 5–15)
AST SERPL-CCNC: 13 U/L (ref 15–37)
BASOPHILS # BLD: 0.1 K/UL (ref 0–0.1)
BASOPHILS NFR BLD: 1 % (ref 0–1)
BILIRUB SERPL-MCNC: 0.4 MG/DL (ref 0.2–1)
BUN SERPL-MCNC: 17 MG/DL (ref 6–20)
BUN/CREAT SERPL: 21 (ref 12–20)
CALCIUM SERPL-MCNC: 9 MG/DL (ref 8.5–10.1)
CHLORIDE SERPL-SCNC: 108 MMOL/L (ref 97–108)
CHOLEST SERPL-MCNC: 157 MG/DL
CO2 SERPL-SCNC: 27 MMOL/L (ref 21–32)
CREAT SERPL-MCNC: 0.8 MG/DL (ref 0.55–1.02)
DIFFERENTIAL METHOD BLD: ABNORMAL
EOSINOPHIL # BLD: 0.1 K/UL (ref 0–0.4)
EOSINOPHIL NFR BLD: 1 % (ref 0–7)
ERYTHROCYTE [DISTWIDTH] IN BLOOD BY AUTOMATED COUNT: 13.3 % (ref 11.5–14.5)
EST. AVERAGE GLUCOSE BLD GHB EST-MCNC: 128 MG/DL
GLOBULIN SER CALC-MCNC: 2.5 G/DL (ref 2–4)
GLUCOSE SERPL-MCNC: 139 MG/DL (ref 65–100)
HBA1C MFR BLD: 6.1 % (ref 4–5.6)
HCT VFR BLD AUTO: 40.3 % (ref 35–47)
HDLC SERPL-MCNC: 41 MG/DL
HDLC SERPL: 3.8 (ref 0–5)
HGB BLD-MCNC: 13.2 G/DL (ref 11.5–16)
IMM GRANULOCYTES # BLD AUTO: 0 K/UL (ref 0–0.04)
IMM GRANULOCYTES NFR BLD AUTO: 0 % (ref 0–0.5)
LDLC SERPL CALC-MCNC: ABNORMAL MG/DL (ref 0–100)
LDLC SERPL DIRECT ASSAY-MCNC: 85 MG/DL (ref 0–100)
LYMPHOCYTES # BLD: 3.6 K/UL (ref 0.8–3.5)
LYMPHOCYTES NFR BLD: 38 % (ref 12–49)
MCH RBC QN AUTO: 29.1 PG (ref 26–34)
MCHC RBC AUTO-ENTMCNC: 32.8 G/DL (ref 30–36.5)
MCV RBC AUTO: 89 FL (ref 80–99)
MONOCYTES # BLD: 0.6 K/UL (ref 0–1)
MONOCYTES NFR BLD: 6 % (ref 5–13)
NEUTS SEG # BLD: 5.1 K/UL (ref 1.8–8)
NEUTS SEG NFR BLD: 54 % (ref 32–75)
NRBC # BLD: 0 K/UL (ref 0–0.01)
NRBC BLD-RTO: 0 PER 100 WBC
PLATELET # BLD AUTO: 296 K/UL (ref 150–400)
PMV BLD AUTO: 10.2 FL (ref 8.9–12.9)
POTASSIUM SERPL-SCNC: 4.2 MMOL/L (ref 3.5–5.1)
PROT SERPL-MCNC: 6.5 G/DL (ref 6.4–8.2)
RBC # BLD AUTO: 4.53 M/UL (ref 3.8–5.2)
SODIUM SERPL-SCNC: 142 MMOL/L (ref 136–145)
TRIGL SERPL-MCNC: 457 MG/DL
TSH SERPL DL<=0.05 MIU/L-ACNC: 0.83 UIU/ML (ref 0.36–3.74)
VLDLC SERPL CALC-MCNC: ABNORMAL MG/DL
WBC # BLD AUTO: 9.4 K/UL (ref 3.6–11)

## 2024-02-28 NOTE — PROGRESS NOTES
Medicare Annual Wellness Visit    Floridalma Gonzalez is here for Medicare AWV, Medication Problem (Omeprazole questions), and Lower Back Pain (X 1 month  6/10 intermittent pain)        ICD-10-CM    1. Encounter for Medicare annual examination with abnormal findings  Z00.01 Hemoglobin A1C     Comprehensive Metabolic Panel     CBC with Auto Differential     TSH     Lipid Panel     Vitamin D 25 Hydroxy     Vitamin D 25 Hydroxy     Lipid Panel     TSH     CBC with Auto Differential     Comprehensive Metabolic Panel     Hemoglobin A1C      2. Cerebrovascular accident (CVA), unspecified mechanism (HCC)  I63.9 Comprehensive Metabolic Panel     Lipid Panel     Lipid Panel     Comprehensive Metabolic Panel      3. Essential hypertension  I10 Comprehensive Metabolic Panel     Lipid Panel     Lipid Panel     Comprehensive Metabolic Panel      4. Hyperlipidemia LDL goal <70  E78.5 Comprehensive Metabolic Panel     Lipid Panel     Lipid Panel     Comprehensive Metabolic Panel      5. Elevated blood sugar  R73.9 Hemoglobin A1C     Comprehensive Metabolic Panel     Comprehensive Metabolic Panel     Hemoglobin A1C      6. Seizure (HCC)  R56.9       7. Vitamin D deficiency, unspecified  E55.9 Vitamin D 25 Hydroxy     Vitamin D 25 Hydroxy      8. Gastroesophageal reflux disease without esophagitis  K21.9       9. Herpes zoster without complication  B02.9           Time was used for level of billing: yes, 35 minutes reviewing previous notes, test results and office visit with the patient discussing the diagnosis and importance of compliance with the treatment plan as well as documenting on the day of the visit.      Time listed above was time used outside of routine AWV workup and care.    Encounter for Medicare annual examination with abnormal findings  -     Hemoglobin A1C; Future  -     Comprehensive Metabolic Panel; Future  -     CBC with Auto Differential; Future  -     TSH; Future  -     Lipid Panel; Future  -     Vitamin D

## 2024-03-18 DIAGNOSIS — I10 PRIMARY HYPERTENSION: ICD-10-CM

## 2024-03-18 DIAGNOSIS — Z98.890 HISTORY OF LOOP RECORDER: ICD-10-CM

## 2024-03-18 DIAGNOSIS — Z86.39 H/O HYPERLIPIDEMIA: ICD-10-CM

## 2024-03-18 LAB
ANION GAP SERPL CALC-SCNC: 4 MMOL/L (ref 5–15)
BUN SERPL-MCNC: 15 MG/DL (ref 6–20)
BUN/CREAT SERPL: 16 (ref 12–20)
CALCIUM SERPL-MCNC: 9.5 MG/DL (ref 8.5–10.1)
CHLORIDE SERPL-SCNC: 107 MMOL/L (ref 97–108)
CO2 SERPL-SCNC: 30 MMOL/L (ref 21–32)
CREAT SERPL-MCNC: 0.91 MG/DL (ref 0.55–1.02)
ERYTHROCYTE [DISTWIDTH] IN BLOOD BY AUTOMATED COUNT: 13.2 % (ref 11.5–14.5)
GLUCOSE SERPL-MCNC: 96 MG/DL (ref 65–100)
HCT VFR BLD AUTO: 43.7 % (ref 35–47)
HGB BLD-MCNC: 14.7 G/DL (ref 11.5–16)
MCH RBC QN AUTO: 29.3 PG (ref 26–34)
MCHC RBC AUTO-ENTMCNC: 33.6 G/DL (ref 30–36.5)
MCV RBC AUTO: 87.2 FL (ref 80–99)
NRBC # BLD: 0 K/UL (ref 0–0.01)
NRBC BLD-RTO: 0 PER 100 WBC
PLATELET # BLD AUTO: 303 K/UL (ref 150–400)
PMV BLD AUTO: 9.8 FL (ref 8.9–12.9)
POTASSIUM SERPL-SCNC: 4.1 MMOL/L (ref 3.5–5.1)
RBC # BLD AUTO: 5.01 M/UL (ref 3.8–5.2)
SODIUM SERPL-SCNC: 141 MMOL/L (ref 136–145)
WBC # BLD AUTO: 10.4 K/UL (ref 3.6–11)

## 2024-03-26 RX ORDER — VALACYCLOVIR HYDROCHLORIDE 500 MG/1
TABLET, FILM COATED ORAL
Qty: 60 TABLET | Refills: 5 | Status: SHIPPED | OUTPATIENT
Start: 2024-03-26

## 2024-04-02 ENCOUNTER — PREP FOR PROCEDURE (OUTPATIENT)
Age: 74
End: 2024-04-02

## 2024-04-08 ENCOUNTER — HOSPITAL ENCOUNTER (OUTPATIENT)
Facility: HOSPITAL | Age: 74
Setting detail: OUTPATIENT SURGERY
Discharge: HOME | End: 2024-04-08
Attending: INTERNAL MEDICINE | Admitting: INTERNAL MEDICINE
Payer: MEDICARE

## 2024-04-08 VITALS — HEIGHT: 65 IN | BODY MASS INDEX: 29.52 KG/M2 | WEIGHT: 177.2 LBS

## 2024-04-08 DIAGNOSIS — Z45.09 ENCOUNTER FOR LOOP RECORDER AT END OF BATTERY LIFE: ICD-10-CM

## 2024-04-08 LAB — ECHO BSA: 1.92 M2

## 2024-04-08 PROCEDURE — 33286 RMVL SUBQ CAR RHYTHM MNTR: CPT | Performed by: INTERNAL MEDICINE

## 2024-04-08 PROCEDURE — 2709999900 HC NON-CHARGEABLE SUPPLY: Performed by: INTERNAL MEDICINE

## 2024-04-08 PROCEDURE — 2500000003 HC RX 250 WO HCPCS: Performed by: INTERNAL MEDICINE

## 2024-04-08 RX ORDER — SODIUM CHLORIDE 0.9 % (FLUSH) 0.9 %
5-40 SYRINGE (ML) INJECTION PRN
Status: DISCONTINUED | OUTPATIENT
Start: 2024-04-08 | End: 2024-04-08 | Stop reason: HOSPADM

## 2024-04-08 RX ORDER — SODIUM CHLORIDE 0.9 % (FLUSH) 0.9 %
5-40 SYRINGE (ML) INJECTION PRN
Status: CANCELLED | OUTPATIENT
Start: 2024-04-08

## 2024-04-08 RX ORDER — ONDANSETRON 2 MG/ML
4 INJECTION INTRAMUSCULAR; INTRAVENOUS EVERY 6 HOURS PRN
Status: CANCELLED | OUTPATIENT
Start: 2024-04-08

## 2024-04-08 RX ORDER — ACETAMINOPHEN 325 MG/1
650 TABLET ORAL EVERY 4 HOURS PRN
Status: CANCELLED | OUTPATIENT
Start: 2024-04-08

## 2024-04-08 RX ORDER — CLINDAMYCIN HYDROCHLORIDE 150 MG/1
150 CAPSULE ORAL 3 TIMES DAILY
Qty: 9 CAPSULE | Refills: 0 | Status: SHIPPED | OUTPATIENT
Start: 2024-04-08 | End: 2024-04-11

## 2024-04-08 NOTE — DISCHARGE INSTRUCTIONS
Discharge Instructions Post Implantable Loop Recorder Explant    1.  Clindamycin antibiotic is sent to [facility] for 3 days    2.  You may remove your dressing 3-5 days later, may shower with it right away.  Please do not peel or scrub the underlying skin glue; it will break down on its own over time.    3.  You may shower starting today      Future Appointments   Date Time Provider Department Center   11/5/2024  9:00 AM Pancho Purvis MD [id number] BS AMB         River Ayala M.D.  Electrophysiology/Cardiology  [facility]  [address] [place] [id number] [phone]
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
## 2024-04-08 NOTE — PROGRESS NOTES
7:15 am  Patient arrived. ID and allergies verified verbally with patient. Pt voices understanding of procedure to be performed. Consent obtained. Pt prepped for procedure. Pt denies contrast allergy. Patient denies taking any blood thinners.      8:06 AM  TRANSFER - OUT REPORT:    [facility]       Report consisted of patient's Situation, Background, Assessment and   Recommendations(SBAR).     Information from the following report(s) Nurse Handoff Report was reviewed with the receiving nurse.           Lines:       Opportunity for questions and clarification was provided.      Patient transported with:  Registered Nurse and Tech    8:40 am  TRANSFER - IN REPORT:    Verbal report received from [facility] on [facility]  being received from  for routine post-op      Report consisted of patient's Situation, Background, Assessment and   Recommendations(SBAR).     Information from the following report(s) Nurse Handoff Report was reviewed with the receiving nurse.    Opportunity for questions and clarification was provided.      Assessment completed upon patient's arrival to unit and care assumed.       8:45 AM  Discharge instructions and prescriptions reviewed with  PATIENT. Opportunity provided for questions. Patient verbalized understanding. Signed copy of discharge placed in the front of patient's chart.    8:50 am  Patient escorted to entrance.  self driving. Patient discharged into care of self.

## 2024-04-08 NOTE — PROCEDURES
Cardiac Procedure Note   Patient: Floridalma Gonzalez  MRN: [id number]  SSN: [id number]   YOB: 1950 Age: 73 y.o.  Sex: female    Date of Procedure: 4/8/2024   Pre-procedure Diagnosis: loop recorder end of life, TIA  Post-procedure Diagnosis: same  Procedure: loop recorder removal  :  Dr. River Ayala MD  Assistant(s):  None  Anesthesia: local lidocaine  Estimated Blood Loss: Less than 10 mL   Specimens Removed: [employer] LNQ RLA [id number]  Findings: incision was closed with 2-0 vicryl suture, Exofin and then Aquacel dressing  Complications: None   Implants:  None  Signed by:  River Ayala MD  4/8/2024  8:43 AM

## 2024-06-03 RX ORDER — DILTIAZEM HYDROCHLORIDE 120 MG/1
120 CAPSULE, EXTENDED RELEASE ORAL DAILY
Qty: 90 CAPSULE | Refills: 3 | Status: SHIPPED | OUTPATIENT
Start: 2024-06-03

## 2024-06-03 RX ORDER — CLOPIDOGREL BISULFATE 75 MG/1
TABLET ORAL
Qty: 90 TABLET | Refills: 3 | Status: SHIPPED | OUTPATIENT
Start: 2024-06-03

## 2024-06-26 LAB — MAMMOGRAPHY, EXTERNAL: NORMAL

## 2024-10-17 NOTE — PROCEDURES
//Section of Hematology and Stem Cell Transplantation    Post-Transplantation Follow Up Visit     10/17/24    Transplant History:   Primary oncologist: Paco Hickey MD  Primary oncologic diagnosis: MDS  Transplant date: 9/19/2024  Donor: haploidentical  Blood Type (Patient): B +  Blood Type (Donor): A +  CMV (Patient): Positive  CMV (Donor): Positive  Graft source: Bone marrow  CD34+ cell dose: 3.55x10^6  Conditioning Regimen: Fludarabine plus melphalan 100 mg/m2 + 2Gy TBI  GVHD prophylaxis: Post-transplant cyclophosphamide, Tacrolimus, MMF  Immediate post-transplant complications: Patient experienced expected GI toxicities with C diff negative diarrhea and nausea, neutropenic fever with negative infectious work up, expected cytopenias requiring transfusions, electrolyte abnormalities requiring replacement, volume overload requiring diuresis, intermittent SOB from pulmonary edema requiring 02, and a hemorrhoid flare up. Diarrhea and SOB improved towards the end of the hospital stay.     History of Present Ilness:   Guillaume Salinas (Guillaume) is a pleasant 68 y.o.male with a past medical history of MDS who is status post haploidentical stem cell transplantation conditioned with FluMel 100 + PTCy+ 2Gy TBI who is currently 27 who presents for post-transplant follow up. He was discharged from the hospital yesterday, October 16th (day 27).     Presents with his wife (daughter over phone). He declined  services, opted for his wife/daughter to help. Generally doing okay, fatigued and nauseous since discharge. Minimal appetite. No fevers/infectious concerns. We reviewed post-allo precautions at length. Going to specialty pharmacy after visit to  GCSF injections.     PAST MEDICAL HISTORY:   Past Medical History:   Diagnosis Date    Anticoagulant long-term use     Coronary artery disease     Hypertension     Myelodysplastic syndrome     Peripheral vascular disease, unspecified        PAST SURGICAL  Cardiac Electrophysiology Report      PATIENT INFORMATION     Patient Name: Jose Salgado  MRN: [id number]       Study Date: 2019    YOB: 1950   Age: 78 y.o. Gender: female      Procedure:  Loop Recorder Injectiona    Referring Physician:  Kellie Arizmendi MD and Marce Loving NP       STAFF     Duty Name   Electrophysiologist Connie Scott MD   [address] [place] [address]       PATIENT HISTORY     Tasneem Bridges is a 70 y.o. female with history of multiple TIAs/possible stroke who is referred for further evaluation of arrhythmia in the setting of her cryptogenic stroke. She presents for injection of a loop recorder. PROCEDURE     The patient was brought to the Cardiac Electrophysiology laboratory prep area in a post-absorptive, fasting state. Informed consent was obtained. A peripheral IV was in place. Continuous electrocardiographic, blood pressure, O2 saturation and  CO2 monitoring was initiated. Pre-operative antibiotics were administered pre-operatively. Self-adhesive cardioversion patches were positioned on the chest.  Conscious sedation was effectuated according to protocol. The patient was then prepped and draped in the usual sterile fashion. A 50/50 mixture of lidocaine (1%) with epinephrine and bupivicaine (0.5%) was utilized for local anesthesia. A blunt incision was delivered along the left sternal border between the 3rd and 4th intercostal space. A loop recorder was then injected successfully using a [employer] loop recorder injection tool. Dermabond adhesive glue was applied to the skin. The patient remained hemodynamically stable, tolerated the procedure well and was transferred in stable condition. There were no immediate complications encountered during the procedure. There was minimal blood loss and no specimen were removed.        LEAD & GENERATOR DATA       Model # Serial #   Generator [employer] [id number] HISTORY:   Past Surgical History:   Procedure Laterality Date    BONE MARROW BIOPSY Left 2023    Procedure: Biopsy-bone marrow;  Surgeon: Harry Diamond MD;  Location: [facility] OR;  Service: Oncology;  Laterality: Left;    COLONOSCOPY N/A 2022    Procedure: COLONOSCOPY Golytely Vaccinated will bring cards;  Surgeon: Dereje Simon MD;  Location: [facility] ENDO;  Service: Endoscopy;  Laterality: N/A;  Do not cancel this order    INSERTION OF LEMONS CATHETER Right 2024    Procedure: INSERTION, CATHETER, CENTRAL VENOUS, LEMONS TRIPLE LUMEN;  Surgeon: Kg Patten MD;  Location: [address];  Service: General;  Laterality: Right;       PAST SOCIAL HISTORY:  Social History     Tobacco Use    Smoking status: Former     Current packs/day: 0.00     Average packs/day: 0.3 packs/day for 50.0 years (12.5 ttl pk-yrs)     Types: Cigarettes     Start date: 3/1/1973     Quit date: 3/1/2023     Years since quittin.6     Passive exposure: Past    Smokeless tobacco: Never   Substance Use Topics    Alcohol use: Not Currently    Drug use: Never       FAMILY HISTORY:  Family History   Problem Relation Name Age of Onset    Hypertension Mother      Cancer Father      Cancer Brother 9     No Known Problems Daughter      No Known Problems Daughter      No Known Problems Son         CURRENT MEDICATIONS:   Current Outpatient Medications   Medication Sig    acyclovir (ZOVIRAX) 800 MG Tab Take 1 tablet (800 mg total) by mouth 2 (two) times daily.    carvediloL (COREG) 6.25 MG tablet Take 1 tablet (6.25 mg total) by mouth 2 (two) times daily.    filgrastim-sndz (ZARXIO) 300 mcg/0.5 mL Syrg Inject 0.5 mLs (300 mcg total) into the skin once daily.    gabapentin (NEURONTIN) 300 MG capsule Take 2 capsules (600 mg total) by mouth 2 (two) times daily.    letermovir (PREVYMIS) 480 mg Tab Take 1 tablet (480 mg total) by mouth Daily.    LIDOcaine (LIDODERM) 5 % Place 1 patch onto the skin once daily. Remove & Discard  MEDICATION SUMMARY     Medication Route Unit Total   Keflex  PO mg 500       RADIOLOGY SUMMARY     N/A      CONCLUSIONS     1. Successful injection of loop recorder. 2. Wound check in EP clinic in 14 days. 3. Oral antibiotics x 7 days. 4.  Follow up in EP clinic in 3 months or earlier if necessary. 5. Follow up with  Hui Cee MD and Kusum Noel NP as scheduled. Thank you Hui Cee MD and Kusum Noel NP for allowing me to participate in the care of this extraordinarily pleasant female.         Fernando Worley MD  Cardiac Electrophysiology / Cardiology    [address], [address] [facility], [address]  [place] [address]  [phone] / [phone] Fax [phone] / [phone] Fax patch within 12 hours or as directed by MD. Place patch to left shoulder.    loperamide (IMODIUM) 2 mg capsule Take 1 capsule (2 mg total) by mouth 2 (two) times a day.    magnesium oxide (MAG-OX) 400 mg (241.3 mg magnesium) tablet Take 2 tablets (800 mg total) by mouth once daily.    mycophenolate (CELLCEPT) 250 mg Cap Take 4 capsules (1,000 mg total) by mouth 3 (three) times daily. STOP 10-24-24    OLANZapine (ZYPREXA) 5 MG tablet Take 1 tablet (5 mg total) by mouth every evening.    ondansetron (ZOFRAN-ODT) 8 MG TbDL DISSOLVE 1 tablet (8 mg total) by mouth every 8 (eight) hours.    pantoprazole (PROTONIX) 40 MG tablet Take 1 tablet (40 mg total) by mouth once daily.    posaconazole (NOXAFIL) 100 mg TbEC tablet Take 3 tablets (300 mg total) by mouth once daily.    prochlorperazine (COMPAZINE) 5 MG tablet Take 1 tablet (5 mg total) by mouth every 6 (six) hours.    [START ON 10/21/2024] sulfamethoxazole-trimethoprim 800-160mg (BACTRIM DS) 800-160 mg Tab Take 1 tablet by mouth every Mon, Wed, Fri. START 10/21/24    tacrolimus (PROGRAF) 0.5 MG Cap Take 1 capsule (0.5 mg total) by mouth every morning AND 2 capsules (1 mg total) every evening.    traMADoL (ULTRAM) 50 mg tablet Take 1 tablet (50 mg total) by mouth every 6 (six) hours as needed for Pain.    traZODone (DESYREL) 50 MG tablet Take 1 tablet (50 mg total) by mouth nightly as needed for Insomnia.    ursodioL (ACTIGALL) 300 mg capsule Take 1 capsule (300 mg total) by mouth 2 (two) times daily. STOP 10-19-24    vancomycin 125 mg/5 mL Soln Take 5 mLs (125 mg total) by mouth 2 (two) times a day.     No current facility-administered medications for this visit.       ALLERGIES:   Review of patient's allergies indicates:  No Known Allergies    GVHD Review of Systems:     Pertinent positives and negatives included in the HPI. Otherwise a 14 point review of systems is negative. GVHD review of systems recorded in BMT flowsheet.     Physical Exam:     Vitals:    10/17/24  1452   BP: (!) 110/55   Pulse:    Resp:      General: Appears well, NAD  HEENT: MMM, no OP lesions  Pulmonary: CTAB, no increased work of breathing, no W/R/C  Cardiovascular: S1S2 normal, RRR, no M/R/G  Abdominal: Soft, NT, ND, BS+, no HSM  Extremities: No C/C/E  Neurological: AAOx4, grossly normal, no focal deficits  Dermatologic: No appreciable rashes or lesions    ECOG Performance Status: (foot note - ECOG PS provided by Eastern Cooperative Oncology Group) 1 - Symptomatic but completely ambulatory    Karnofsky Performance Score:  70%- Cares for Self: Unable to Carry on Normal Activity or Active Work    Labs:   Lab Results   Component Value Date    WBC 2.12 (L) 10/17/2024    RBC 3.01 (L) 10/17/2024    HGB 9.0 (L) 10/17/2024    HCT 26.3 (L) 10/17/2024    MCV 87 10/17/2024    MCH 29.9 10/17/2024    MCHC 34.2 10/17/2024    RDW 12.8 10/17/2024    PLT 16 (LL) 10/17/2024    MPV 12.9 10/17/2024    GRAN 58.0 10/17/2024    LYMPH CANCELED 10/17/2024    LYMPH 5.0 (L) 10/17/2024    MONO CANCELED 10/17/2024    MONO 24.0 (H) 10/17/2024    EOS CANCELED 10/17/2024    BASO CANCELED 10/17/2024    EOSINOPHIL 0.0 10/17/2024    BASOPHIL 1.0 10/17/2024       Sodium   Date Value Ref Range Status   10/17/2024 139 136 - 145 mmol/L Final     Potassium   Date Value Ref Range Status   10/17/2024 3.8 3.5 - 5.1 mmol/L Final     Chloride   Date Value Ref Range Status   10/17/2024 103 95 - 110 mmol/L Final     CO2   Date Value Ref Range Status   10/17/2024 26 23 - 29 mmol/L Final     Glucose   Date Value Ref Range Status   10/17/2024 124 (H) 70 - 110 mg/dL Final     BUN   Date Value Ref Range Status   10/17/2024 13 8 - 23 mg/dL Final     Creatinine   Date Value Ref Range Status   10/17/2024 0.8 0.5 - 1.4 mg/dL Final     Calcium   Date Value Ref Range Status   10/17/2024 9.2 8.7 - 10.5 mg/dL Final     Total Protein   Date Value Ref Range Status   10/17/2024 6.0 6.0 - 8.4 g/dL Final     Albumin   Date Value Ref Range Status   10/17/2024 3.4 (L)  3.5 - 5.2 g/dL Final     Total Bilirubin   Date Value Ref Range Status   10/17/2024 0.5 0.1 - 1.0 mg/dL Final     Comment:     For infants and newborns, interpretation of results should be based  on gestational age, weight and in agreement with clinical  observations.    Premature Infant recommended reference ranges:  Up to 24 hours.............<8.0 mg/dL  Up to 48 hours............<12.0 mg/dL  3-5 days..................<15.0 mg/dL  6-29 days.................<15.0 mg/dL       Alkaline Phosphatase   Date Value Ref Range Status   10/17/2024 69 55 - 135 U/L Final     AST   Date Value Ref Range Status   10/17/2024 32 10 - 40 U/L Final     ALT   Date Value Ref Range Status   10/17/2024 32 10 - 44 U/L Final     Anion Gap   Date Value Ref Range Status   10/17/2024 10 8 - 16 mmol/L Final     eGFR if    Date Value Ref Range Status   08/27/2021 >60 >60 mL/min/1.73 m^2 Final   08/27/2021 >60 >60 mL/min/1.73 m^2 Final   08/27/2021 >60 >60 mL/min/1.73 m^2 Final     eGFR if non    Date Value Ref Range Status   08/27/2021 57 (A) >60 mL/min/1.73 m^2 Final     Comment:     Calculation used to obtain the estimated glomerular filtration  rate (eGFR) is the CKD-EPI equation.      08/27/2021 57 (A) >60 mL/min/1.73 m^2 Final     Comment:     Calculation used to obtain the estimated glomerular filtration  rate (eGFR) is the CKD-EPI equation.      08/27/2021 57 (A) >60 mL/min/1.73 m^2 Final     Comment:     Calculation used to obtain the estimated glomerular filtration  rate (eGFR) is the CKD-EPI equation.          Imaging:   Hospital imaging reviewed.    Pathology:  Prior pathology reviewed. Plan for day +30 bone marrow biopsy on 10/23/24    Acute GVHD Scoring:  GVHD Acute Assessment     10/21/2024 No acute GVHD.     Assessment and Plan:   Guillaume Salinas (Guillaume) is a pleasant 68 y.o.male with a past medical history of MDS s/p haplo SCT who presents for post-transplant follow up.    MDS: Status  post treatment with azacitidine 75 mg/m2 daily x7 days plus venetoclax 100mg (voriconazole) daily x14 of 28 days.Venetoclax decreased to 7 days with cycle 3 until completion of 11 cycles. Primary oncologist is Paco Hickey MD who will be managing primary underlying disease.     Status post allogeneic stem cell transplantation: As noted above, status post haploidentical stem cell transplantation conditioned with FluMel 100 + PTCy+ 2Gy TBI . Currently 27. Engrafted on 10/09/24 day +20.  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   Current tacro dose: 0.5 mg every morning, and 1 mg nightly   Last tacro level: 9.5  Adjustments: No changes    Immunosuppression: Prevention with posaconazole, acyclovir. Cdiff diarrhea prophylaxis with Vancomycin PO BID. CMV prophylaxis with letermovir. PJP prophyalxis Bactrim MWF. Continue weekly monitoring of CMV and EBV.  Last CMV: Not-detected 10/14, last EBV: Not-detected 10/14.  Active infections: N/A    Pancytopenia: Due to underlying disease and chemotherapy. Transfuse for Hgb <7 g/dL and platelets <10k.  Filgrastim 300mcg/0.5 subcutaneously daily x 5 days.    Hypomagnesemia: Related to tacro, poor po intake. Increase MagOx to 800mg twice daily.      Chemotherapy Induced Nausea: Continue olanzapine 5mg qhs and ondansetron 8mg q8hr PRN.     Follow up: Twice weekly follow up with labs.    Medical Complexity:     Visit today included increased complexity associated with the care of the episodic problems addressed above and managing the longitudinal care of the patient due to the serious and/or complex managed problem(s) history of Allo SCT.      Orders Placed:       No orders of the defined types were placed in this encounter.      Follow Up:      Twice weekly follow up as scheduled.       Sondra Jimenez [facility], PA-C  Hematology, Oncology, and Stem Cell  Transplantation  [facility]

## 2024-10-31 DIAGNOSIS — E78.5 HYPERLIPIDEMIA LDL GOAL <70: ICD-10-CM

## 2024-11-01 RX ORDER — ATORVASTATIN CALCIUM 40 MG/1
40 TABLET, FILM COATED ORAL NIGHTLY
Qty: 90 TABLET | Refills: 1 | Status: SHIPPED | OUTPATIENT
Start: 2024-11-01

## 2024-11-05 ENCOUNTER — OFFICE VISIT (OUTPATIENT)
Age: 74
End: 2024-11-05
Payer: MEDICARE

## 2024-11-05 VITALS
SYSTOLIC BLOOD PRESSURE: 127 MMHG | HEIGHT: 65 IN | RESPIRATION RATE: 20 BRPM | OXYGEN SATURATION: 96 % | WEIGHT: 170 LBS | TEMPERATURE: 97.6 F | HEART RATE: 88 BPM | BODY MASS INDEX: 28.32 KG/M2 | DIASTOLIC BLOOD PRESSURE: 80 MMHG

## 2024-11-05 DIAGNOSIS — I63.9 CEREBROVASCULAR ACCIDENT (CVA), UNSPECIFIED MECHANISM (HCC): Primary | ICD-10-CM

## 2024-11-05 DIAGNOSIS — I10 ESSENTIAL HYPERTENSION: ICD-10-CM

## 2024-11-05 DIAGNOSIS — Z86.73 HISTORY OF STROKE: ICD-10-CM

## 2024-11-05 DIAGNOSIS — E78.5 HYPERLIPIDEMIA LDL GOAL <70: ICD-10-CM

## 2024-11-05 DIAGNOSIS — I63.9 CEREBROVASCULAR ACCIDENT (CVA), UNSPECIFIED MECHANISM (HCC): ICD-10-CM

## 2024-11-05 DIAGNOSIS — R73.9 ELEVATED BLOOD SUGAR: ICD-10-CM

## 2024-11-05 DIAGNOSIS — F51.01 PRIMARY INSOMNIA: ICD-10-CM

## 2024-11-05 LAB
ALBUMIN SERPL-MCNC: 4.1 G/DL (ref 3.5–5)
ALBUMIN/GLOB SERPL: 1.5 (ref 1.1–2.2)
ALP SERPL-CCNC: 141 U/L (ref 45–117)
ALT SERPL-CCNC: 29 U/L (ref 12–78)
ANION GAP SERPL CALC-SCNC: 8 MMOL/L (ref 2–12)
AST SERPL-CCNC: 12 U/L (ref 15–37)
BILIRUB SERPL-MCNC: 0.7 MG/DL (ref 0.2–1)
BUN SERPL-MCNC: 15 MG/DL (ref 6–20)
BUN/CREAT SERPL: 17 (ref 12–20)
CALCIUM SERPL-MCNC: 9.8 MG/DL (ref 8.5–10.1)
CHLORIDE SERPL-SCNC: 106 MMOL/L (ref 97–108)
CHOLEST SERPL-MCNC: 180 MG/DL
CO2 SERPL-SCNC: 27 MMOL/L (ref 21–32)
CREAT SERPL-MCNC: 0.87 MG/DL (ref 0.55–1.02)
EST. AVERAGE GLUCOSE BLD GHB EST-MCNC: 120 MG/DL
GLOBULIN SER CALC-MCNC: 2.7 G/DL (ref 2–4)
GLUCOSE SERPL-MCNC: 129 MG/DL (ref 65–100)
HBA1C MFR BLD: 5.8 % (ref 4–5.6)
HDLC SERPL-MCNC: 42 MG/DL
HDLC SERPL: 4.3 (ref 0–5)
LDLC SERPL CALC-MCNC: 59.6 MG/DL (ref 0–100)
POTASSIUM SERPL-SCNC: 4.2 MMOL/L (ref 3.5–5.1)
PROT SERPL-MCNC: 6.8 G/DL (ref 6.4–8.2)
SODIUM SERPL-SCNC: 141 MMOL/L (ref 136–145)
TRIGL SERPL-MCNC: 392 MG/DL
VLDLC SERPL CALC-MCNC: 78.4 MG/DL

## 2024-11-05 PROCEDURE — 99214 OFFICE O/P EST MOD 30 MIN: CPT | Performed by: FAMILY MEDICINE

## 2024-11-05 RX ORDER — TRAZODONE HYDROCHLORIDE 50 MG/1
50 TABLET, FILM COATED ORAL NIGHTLY
Qty: 90 TABLET | Refills: 1 | Status: SHIPPED | OUTPATIENT
Start: 2024-11-05

## 2024-11-05 SDOH — ECONOMIC STABILITY: FOOD INSECURITY: WITHIN THE PAST 12 MONTHS, THE FOOD YOU BOUGHT JUST DIDN'T LAST AND YOU DIDN'T HAVE MONEY TO GET MORE.: NEVER TRUE

## 2024-11-05 SDOH — ECONOMIC STABILITY: FOOD INSECURITY: WITHIN THE PAST 12 MONTHS, YOU WORRIED THAT YOUR FOOD WOULD RUN OUT BEFORE YOU GOT MONEY TO BUY MORE.: NEVER TRUE

## 2024-11-05 SDOH — ECONOMIC STABILITY: INCOME INSECURITY: HOW HARD IS IT FOR YOU TO PAY FOR THE VERY BASICS LIKE FOOD, HOUSING, MEDICAL CARE, AND HEATING?: NOT HARD AT ALL

## 2024-11-05 ASSESSMENT — PATIENT HEALTH QUESTIONNAIRE - PHQ9
2. FEELING DOWN, DEPRESSED OR HOPELESS: NOT AT ALL
SUM OF ALL RESPONSES TO PHQ QUESTIONS 1-9: 0
SUM OF ALL RESPONSES TO PHQ QUESTIONS 1-9: 0
1. LITTLE INTEREST OR PLEASURE IN DOING THINGS: NOT AT ALL
SUM OF ALL RESPONSES TO PHQ QUESTIONS 1-9: 0
SUM OF ALL RESPONSES TO PHQ QUESTIONS 1-9: 0
SUM OF ALL RESPONSES TO PHQ9 QUESTIONS 1 & 2: 0

## 2024-11-05 NOTE — PROGRESS NOTES
Patient here for 1 yr follow up for non-fasting labs.     \"Have you been to the ER, urgent care clinic since your last visit?  Hospitalized since your last visit?\"    NO    “Have you seen or consulted any other health care providers outside our system since your last visit?”    NO             
Patient here for 1 yr follow up for non-fasting labs.     \"Have you been to the ER, urgent care clinic since your last visit?  Hospitalized since your last visit?\"    NO    “Have you seen or consulted any other health care providers outside our system since your last visit?”    NO             
meals five nights a week for 3.5 weeks. However, she has since reduced her bread intake and has been making healthier food choices, such as having broccoli, pork chops, and half a sweet potato for dinner. She also enjoys making sandwiches and has been incorporating tuna into her diet.    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    Review of Systems       Objective   Blood pressure 127/80, pulse 88, temperature 97.6 °F (36.4 °C), resp. rate 20, height 1.651 m (5' 5\"), weight 77.1 kg (170 lb), SpO2 96%.  Physical Exam         Chief Complaint   Patient presents with    Follow-up     Not fasting     She is a 73 y.o. female who presents for evalution.     Reviewed PmHx, RxHx, FmHx, SocHx, AllgHx and updated and dated in the chart.    CURRENT MEDS W/ ASSOC DIAG           Start Date End Date     aspirin 81 MG chewable tablet  10/16/19  --     Associated Diagnoses:  --     atorvastatin (LIPITOR) 40 MG tablet  11/01/24  --     TAKE ONE TABLET BY MOUTH ONCE NIGHTLY     Associated Diagnoses:  Hyperlipidemia LDL goal <70     benzonatate (TESSALON) 100 MG capsule  01/02/24  --     Associated Diagnoses:  --     chlorhexidine (PERIDEX) 0.12 % solution  11/07/23  --     Swish and spit 15 mLs  in the morning and 15 mLs in the evening.     Associated Diagnoses:  --     cholecalciferol (VITAMIN D3) 400 UNIT TABS tablet  --  --     Associated Diagnoses:  --     clopidogrel (PLAVIX) 75 MG tablet  06/03/24  --     TAKE ONE TABLET BY MOUTH ONCE NIGHTLY     Associated Diagnoses:  --     diclofenac sodium (VOLTAREN) 1 % GEL  11/07/23  --     Apply 4 g topically 4 times daily     Associated Diagnoses:  --     dilTIAZem (TIAZAC) 120 MG extended release capsule  06/03/24  --     TAKE 1 CAPSULE BY MOUTH DAILY     Associated Diagnoses:  --     Multiple Vitamins-Minerals (ICAPS AREDS 2 PO)  --  --     Associated Diagnoses:  --     nitrofurantoin (MACRODANTIN) 50 MG capsule  --  --     Associated

## 2025-01-04 ENCOUNTER — HOSPITAL ENCOUNTER (EMERGENCY)
Facility: HOSPITAL | Age: 75
Discharge: HOME | End: 2025-01-04
Attending: STUDENT IN AN ORGANIZED HEALTH CARE EDUCATION/TRAINING PROGRAM
Payer: MEDICARE

## 2025-01-04 ENCOUNTER — APPOINTMENT (OUTPATIENT)
Facility: HOSPITAL | Age: 75
End: 2025-01-04
Payer: MEDICARE

## 2025-01-04 VITALS
HEIGHT: 65 IN | RESPIRATION RATE: 16 BRPM | BODY MASS INDEX: 27.99 KG/M2 | WEIGHT: 168 LBS | DIASTOLIC BLOOD PRESSURE: 80 MMHG | OXYGEN SATURATION: 97 % | TEMPERATURE: 98.4 F | HEART RATE: 89 BPM | SYSTOLIC BLOOD PRESSURE: 135 MMHG

## 2025-01-04 DIAGNOSIS — M25.562 ACUTE PAIN OF LEFT KNEE: Primary | ICD-10-CM

## 2025-01-04 PROCEDURE — 73562 X-RAY EXAM OF KNEE 3: CPT

## 2025-01-04 PROCEDURE — 99283 EMERGENCY DEPT VISIT LOW MDM: CPT

## 2025-01-04 ASSESSMENT — LIFESTYLE VARIABLES: HOW OFTEN DO YOU HAVE A DRINK CONTAINING ALCOHOL: NEVER

## 2025-01-04 ASSESSMENT — ENCOUNTER SYMPTOMS: SHORTNESS OF BREATH: 0

## 2025-01-04 ASSESSMENT — PAIN DESCRIPTION - DESCRIPTORS: DESCRIPTORS: DULL

## 2025-01-04 ASSESSMENT — PAIN DESCRIPTION - ORIENTATION: ORIENTATION: LEFT

## 2025-01-04 ASSESSMENT — PAIN DESCRIPTION - LOCATION: LOCATION: KNEE

## 2025-01-04 ASSESSMENT — PAIN - FUNCTIONAL ASSESSMENT: PAIN_FUNCTIONAL_ASSESSMENT: PREVENTS OR INTERFERES SOME ACTIVE ACTIVITIES AND ADLS

## 2025-01-04 ASSESSMENT — PAIN SCALES - GENERAL: PAINLEVEL_OUTOF10: 6

## 2025-01-04 NOTE — ED PROVIDER NOTES
[facility] EMERGENCY DEPT  EMERGENCY DEPARTMENT ENCOUNTER      Pt Name: Floridalma Gonzalez  MRN: [id number]  Birthdate 1950  Date of evaluation: 1/4/2025  Provider: Gonzalez Cazares MD    CHIEF COMPLAINT       Chief Complaint   Patient presents with    Knee Injury         HISTORY OF PRESENT ILLNESS   74-year-old female with history significant for CAD and TIA presents to the ED with chief complaint of left knee pain for the past 1.5 weeks following injury.  Patient says she struck her knee on the car door.  She took Tylenol and ibuprofen initially but has not taken anything for the past week.  She thought pain would go away but it persisted prompting ED visit.  No numbness, weakness, or any other injuries.    The history is provided by the patient.       Review of External Medical Records:     Nursing Notes were reviewed.    REVIEW OF SYSTEMS       Review of Systems   Respiratory:  Negative for shortness of breath.    Cardiovascular:  Negative for chest pain.       Except as noted above the remainder of the review of systems was reviewed and negative.       PAST MEDICAL HISTORY     Past Medical History:   Diagnosis Date    Cancer (HCC)     CLL (chronic lymphocytic leukemia) (HCC)     Coronary artery disease     Dyslipidemia     Neurological disorder     TIA (transient ischemic attack)     TIA (transient ischemic attack) 9/2015         SURGICAL HISTORY       Past Surgical History:   Procedure Laterality Date    BREAST SURGERY      EP DEVICE PROCEDURE N/A 4/8/2024    Loop recorder removal performed by River Ayala MD at [facility] CARDIAC CATH LAB    GYN  2004    DNC    INSERTION SUBQ CARDIAC RHYTHM MONITOR W/PRGRMG N/A 12/2/2019    LOOP RECORDER INSERT performed by Torrey Johnson MD at [facility] CARDIAC CATH LAB    OTHER SURGICAL HISTORY  1990    fibrodema (left breast)     OTHER SURGICAL HISTORY  1999    ganglin cyst of the right hand     OTHER SURGICAL HISTORY  1998    ganglion cyst removed- wrist R    OTHER SURGICAL  General: No focal deficit present.      Mental Status: She is alert and oriented to person, place, and time.   Psychiatric:         Mood and Affect: Mood normal.         Behavior: Behavior normal.         All other labs were within normal range or not returned as of this dictation.    EMERGENCY DEPARTMENT COURSE and DIFFERENTIAL DIAGNOSIS/MDM:   Vitals:    Vitals:    01/04/25 1030   BP: 135/80   Pulse: 89   Resp: 16   Temp: 98.4 °F (36.9 °C)   TempSrc: Oral   SpO2: 97%   Weight: 76.2 kg (168 lb)   Height: 1.651 m (5' 5\")       Medical Decision Making  74-year-old female presents to the ED with left knee pain for 1.5 weeks after injury.  Vital signs stable, has point tenderness on exam.  Differential includes contusion, strain, fracture, pes anserine bursitis.  Obtaining x-ray.  Offered treatment for pain and patient inclined.  Likely discharge with symptomatic therapy at home, Ortho follow-up as needed    Problems Addressed:  Acute pain of left knee: acute illness or injury    Amount and/or Complexity of Data Reviewed  Radiology: ordered and independent interpretation performed.     Details: No fracture on my read of knee x-ray            REASSESSMENT          CONSULTS:  None    PROCEDURES:  Unless otherwise noted below, none     Procedures    DISCHARGE NOTE:  11:13 AM  The patient has been re-evaluated and feeling much better and are stable for discharge.  All available radiology and laboratory results have been reviewed with patient and/or available family.  Patient and/or family verbally conveyed their understanding and agreement of the patient's signs, symptoms, diagnosis, treatment and prognosis and additionally agree to follow-up as recommended in the discharge instructions or to return to the Emergency Department should their condition change or worsen prior to their follow-up appointment.  All questions have been answered and patient and/or available family express understanding.      LABORATORY

## 2025-01-04 NOTE — ED TRIAGE NOTES
Patient presents ambulatory to treatment area with a steady gait. Patient states she struck her left knee with the bottom of her car door about a week and a half ago. States she has been using ice and tylenol for pain, but pain persists.

## 2025-04-10 ENCOUNTER — TELEPHONE (OUTPATIENT)
Facility: CLINIC | Age: 75
End: 2025-04-10

## 2025-04-10 SDOH — HEALTH STABILITY: PHYSICAL HEALTH: ON AVERAGE, HOW MANY DAYS PER WEEK DO YOU ENGAGE IN MODERATE TO STRENUOUS EXERCISE (LIKE A BRISK WALK)?: 7 DAYS

## 2025-04-10 SDOH — HEALTH STABILITY: PHYSICAL HEALTH: ON AVERAGE, HOW MANY MINUTES DO YOU ENGAGE IN EXERCISE AT THIS LEVEL?: 60 MIN

## 2025-04-10 ASSESSMENT — LIFESTYLE VARIABLES
HOW OFTEN DO YOU HAVE A DRINK CONTAINING ALCOHOL: NEVER
HOW MANY STANDARD DRINKS CONTAINING ALCOHOL DO YOU HAVE ON A TYPICAL DAY: 0
HOW OFTEN DO YOU HAVE A DRINK CONTAINING ALCOHOL: 1
HOW OFTEN DO YOU HAVE SIX OR MORE DRINKS ON ONE OCCASION: 1
HOW MANY STANDARD DRINKS CONTAINING ALCOHOL DO YOU HAVE ON A TYPICAL DAY: PATIENT DOES NOT DRINK

## 2025-04-10 ASSESSMENT — PATIENT HEALTH QUESTIONNAIRE - PHQ9
SUM OF ALL RESPONSES TO PHQ QUESTIONS 1-9: 0
1. LITTLE INTEREST OR PLEASURE IN DOING THINGS: NOT AT ALL
2. FEELING DOWN, DEPRESSED OR HOPELESS: NOT AT ALL

## 2025-04-14 ENCOUNTER — OFFICE VISIT (OUTPATIENT)
Facility: CLINIC | Age: 75
End: 2025-04-14
Payer: MEDICARE

## 2025-04-14 VITALS
RESPIRATION RATE: 19 BRPM | HEART RATE: 86 BPM | SYSTOLIC BLOOD PRESSURE: 139 MMHG | WEIGHT: 173 LBS | TEMPERATURE: 97.8 F | DIASTOLIC BLOOD PRESSURE: 89 MMHG | OXYGEN SATURATION: 95 % | BODY MASS INDEX: 28.82 KG/M2 | HEIGHT: 65 IN

## 2025-04-14 DIAGNOSIS — R73.9 ELEVATED BLOOD SUGAR: ICD-10-CM

## 2025-04-14 DIAGNOSIS — R56.9 SEIZURE (HCC): ICD-10-CM

## 2025-04-14 DIAGNOSIS — I10 ESSENTIAL HYPERTENSION: ICD-10-CM

## 2025-04-14 DIAGNOSIS — I63.9 CEREBROVASCULAR ACCIDENT (CVA), UNSPECIFIED MECHANISM (HCC): ICD-10-CM

## 2025-04-14 DIAGNOSIS — R03.0 ELEVATED BLOOD PRESSURE READING: ICD-10-CM

## 2025-04-14 DIAGNOSIS — E78.5 HYPERLIPIDEMIA LDL GOAL <70: ICD-10-CM

## 2025-04-14 DIAGNOSIS — Z00.01 ENCOUNTER FOR MEDICARE ANNUAL EXAMINATION WITH ABNORMAL FINDINGS: Primary | ICD-10-CM

## 2025-04-14 PROCEDURE — G8419 CALC BMI OUT NRM PARAM NOF/U: HCPCS | Performed by: FAMILY MEDICINE

## 2025-04-14 PROCEDURE — 1160F RVW MEDS BY RX/DR IN RCRD: CPT | Performed by: FAMILY MEDICINE

## 2025-04-14 PROCEDURE — 1036F TOBACCO NON-USER: CPT | Performed by: FAMILY MEDICINE

## 2025-04-14 PROCEDURE — 1159F MED LIST DOCD IN RCRD: CPT | Performed by: FAMILY MEDICINE

## 2025-04-14 PROCEDURE — 1126F AMNT PAIN NOTED NONE PRSNT: CPT | Performed by: FAMILY MEDICINE

## 2025-04-14 PROCEDURE — 3017F COLORECTAL CA SCREEN DOC REV: CPT | Performed by: FAMILY MEDICINE

## 2025-04-14 PROCEDURE — 1123F ACP DISCUSS/DSCN MKR DOCD: CPT | Performed by: FAMILY MEDICINE

## 2025-04-14 PROCEDURE — 99214 OFFICE O/P EST MOD 30 MIN: CPT | Performed by: FAMILY MEDICINE

## 2025-04-14 PROCEDURE — 3075F SYST BP GE 130 - 139MM HG: CPT | Performed by: FAMILY MEDICINE

## 2025-04-14 PROCEDURE — 1090F PRES/ABSN URINE INCON ASSESS: CPT | Performed by: FAMILY MEDICINE

## 2025-04-14 PROCEDURE — G8427 DOCREV CUR MEDS BY ELIG CLIN: HCPCS | Performed by: FAMILY MEDICINE

## 2025-04-14 PROCEDURE — 3079F DIAST BP 80-89 MM HG: CPT | Performed by: FAMILY MEDICINE

## 2025-04-14 PROCEDURE — G8399 PT W/DXA RESULTS DOCUMENT: HCPCS | Performed by: FAMILY MEDICINE

## 2025-04-14 PROCEDURE — G0439 PPPS, SUBSEQ VISIT: HCPCS | Performed by: FAMILY MEDICINE

## 2025-04-14 SDOH — ECONOMIC STABILITY: FOOD INSECURITY: WITHIN THE PAST 12 MONTHS, THE FOOD YOU BOUGHT JUST DIDN'T LAST AND YOU DIDN'T HAVE MONEY TO GET MORE.: NEVER TRUE

## 2025-04-14 SDOH — ECONOMIC STABILITY: FOOD INSECURITY: WITHIN THE PAST 12 MONTHS, YOU WORRIED THAT YOUR FOOD WOULD RUN OUT BEFORE YOU GOT MONEY TO BUY MORE.: NEVER TRUE

## 2025-04-14 NOTE — PROGRESS NOTES
Chief Complaint   Patient presents with    Medicare AWV    Lab Collection     Patient presents in the office today for a AWV and labs.  No other concerns.    \"Have you been to the ER, urgent care clinic since your last visit?  Hospitalized since your last visit?\"    NO    “Have you seen or consulted any other health care providers outside our system since your last visit?”    NO            
information                     Objective   Vitals:    04/14/25 1557 04/14/25 1616   BP: (!) 157/85 139/89   BP Site: Left Upper Arm    Patient Position: Sitting    Pulse: 86    Resp: 19    Temp: 97.8 °F (36.6 °C)    TempSrc: Oral    SpO2: 95%    Weight: 78.5 kg (173 lb)    Height: 1.651 m (5' 5\")       Body mass index is 28.79 kg/m².        Physical Exam  Neurological: Awake, alert, oriented x4, no focal deficit  Head: Normocephalic, atraumatic  Ears: External ear canals and tympanic membranes intact  Eyes: Pupils equal and round, conjunctivae clear  Nose: Septum midline, nares patent, mucosa normal  Mouth/Throat: Mucous membranes moist, no erythema, no exudate  Neck: Supple, no abnormalities  Respiratory: Clear to auscultation, no wheezing, rales or rhonchi  Cardiovascular: Regular rate and rhythm, no murmurs, rubs, or gallops  Gastrointestinal: Soft, no tenderness, no distention, no masses  Extremities: No edema, no cyanosis  Musculoskeletal: No joint or muscular abnormalities noted  Skin: No abnormalities, no rashes or lesions              Allergies   Allergen Reactions    Cefuroxime Rash    Ampicillin Hives    Bacitracin Other (See Comments)    Sulfamethoxazole-Trimethoprim Hives     Prior to Visit Medications    Medication Sig Taking? Authorizing Provider   atorvastatin (LIPITOR) 40 MG tablet TAKE ONE TABLET BY MOUTH ONCE NIGHTLY Yes Pancho Purvis MD   clopidogrel (PLAVIX) 75 MG tablet TAKE ONE TABLET BY MOUTH ONCE NIGHTLY Yes Pancho Purvis MD   dilTIAZem (TIAZAC) 120 MG extended release capsule TAKE 1 CAPSULE BY MOUTH DAILY Yes Pancho Purvis MD   cholecalciferol (VITAMIN D3) 400 UNIT TABS tablet Take 1 tablet by mouth daily Yes ProviderShreya MD   Multiple Vitamins-Minerals (ICAPS AREDS 2 PO) Take by mouth Yes ProviderShreya MD   diclofenac sodium (VOLTAREN) 1 % GEL Apply 4 g topically 4 times daily Yes Pancho Purvis MD   aspirin 81 MG chewable tablet Take 1 tablet by mouth daily Yes

## 2025-04-15 ENCOUNTER — RESULTS FOLLOW-UP (OUTPATIENT)
Facility: CLINIC | Age: 75
End: 2025-04-15

## 2025-04-15 DIAGNOSIS — R03.0 ELEVATED BLOOD PRESSURE READING: ICD-10-CM

## 2025-04-15 DIAGNOSIS — Z00.01 ENCOUNTER FOR MEDICARE ANNUAL EXAMINATION WITH ABNORMAL FINDINGS: ICD-10-CM

## 2025-04-15 DIAGNOSIS — R73.9 ELEVATED BLOOD SUGAR: ICD-10-CM

## 2025-04-15 DIAGNOSIS — I63.9 CEREBROVASCULAR ACCIDENT (CVA), UNSPECIFIED MECHANISM (HCC): ICD-10-CM

## 2025-04-15 DIAGNOSIS — I10 ESSENTIAL HYPERTENSION: ICD-10-CM

## 2025-04-15 DIAGNOSIS — E78.5 HYPERLIPIDEMIA LDL GOAL <70: ICD-10-CM

## 2025-04-15 LAB
ALBUMIN SERPL-MCNC: 4.3 G/DL (ref 3.5–5)
ALBUMIN/GLOB SERPL: 1.6 (ref 1.1–2.2)
ALP SERPL-CCNC: 128 U/L (ref 45–117)
ALT SERPL-CCNC: 28 U/L (ref 12–78)
ANION GAP SERPL CALC-SCNC: 5 MMOL/L (ref 2–12)
AST SERPL-CCNC: 14 U/L (ref 15–37)
BASOPHILS # BLD: 0.06 K/UL (ref 0–0.1)
BASOPHILS NFR BLD: 0.6 % (ref 0–1)
BILIRUB SERPL-MCNC: 0.4 MG/DL (ref 0.2–1)
BUN SERPL-MCNC: 22 MG/DL (ref 6–20)
BUN/CREAT SERPL: 24 (ref 12–20)
CALCIUM SERPL-MCNC: 9.4 MG/DL (ref 8.5–10.1)
CHLORIDE SERPL-SCNC: 107 MMOL/L (ref 97–108)
CHOLEST SERPL-MCNC: 156 MG/DL
CO2 SERPL-SCNC: 27 MMOL/L (ref 21–32)
CREAT SERPL-MCNC: 0.9 MG/DL (ref 0.55–1.02)
DIFFERENTIAL METHOD BLD: ABNORMAL
EOSINOPHIL # BLD: 0.08 K/UL (ref 0–0.4)
EOSINOPHIL NFR BLD: 0.8 % (ref 0–7)
ERYTHROCYTE [DISTWIDTH] IN BLOOD BY AUTOMATED COUNT: 12.9 % (ref 11.5–14.5)
EST. AVERAGE GLUCOSE BLD GHB EST-MCNC: 117 MG/DL
GLOBULIN SER CALC-MCNC: 2.7 G/DL (ref 2–4)
GLUCOSE SERPL-MCNC: 87 MG/DL (ref 65–100)
HBA1C MFR BLD: 5.7 % (ref 4–5.6)
HCT VFR BLD AUTO: 41 % (ref 35–47)
HDLC SERPL-MCNC: 50 MG/DL
HDLC SERPL: 3.1 (ref 0–5)
HGB BLD-MCNC: 13.1 G/DL (ref 11.5–16)
IMM GRANULOCYTES # BLD AUTO: 0.02 K/UL (ref 0–0.04)
IMM GRANULOCYTES NFR BLD AUTO: 0.2 % (ref 0–0.5)
LDLC SERPL CALC-MCNC: 48.6 MG/DL (ref 0–100)
LYMPHOCYTES # BLD: 3.78 K/UL (ref 0.8–3.5)
LYMPHOCYTES NFR BLD: 39.8 % (ref 12–49)
MCH RBC QN AUTO: 28.6 PG (ref 26–34)
MCHC RBC AUTO-ENTMCNC: 32 G/DL (ref 30–36.5)
MCV RBC AUTO: 89.5 FL (ref 80–99)
MONOCYTES # BLD: 0.61 K/UL (ref 0–1)
MONOCYTES NFR BLD: 6.4 % (ref 5–13)
NEUTS SEG # BLD: 4.95 K/UL (ref 1.8–8)
NEUTS SEG NFR BLD: 52.2 % (ref 32–75)
NRBC # BLD: 0 K/UL (ref 0–0.01)
NRBC BLD-RTO: 0 PER 100 WBC
PLATELET # BLD AUTO: 309 K/UL (ref 150–400)
PMV BLD AUTO: 10.2 FL (ref 8.9–12.9)
POTASSIUM SERPL-SCNC: 4 MMOL/L (ref 3.5–5.1)
PROT SERPL-MCNC: 7 G/DL (ref 6.4–8.2)
RBC # BLD AUTO: 4.58 M/UL (ref 3.8–5.2)
SODIUM SERPL-SCNC: 139 MMOL/L (ref 136–145)
TRIGL SERPL-MCNC: 287 MG/DL
TSH SERPL DL<=0.05 MIU/L-ACNC: 0.79 UIU/ML (ref 0.36–3.74)
VLDLC SERPL CALC-MCNC: 57.4 MG/DL
WBC # BLD AUTO: 9.5 K/UL (ref 3.6–11)

## 2025-04-29 DIAGNOSIS — E78.5 HYPERLIPIDEMIA LDL GOAL <70: ICD-10-CM

## 2025-04-29 DIAGNOSIS — F51.01 PRIMARY INSOMNIA: ICD-10-CM

## 2025-04-29 RX ORDER — ATORVASTATIN CALCIUM 40 MG/1
40 TABLET, FILM COATED ORAL NIGHTLY
Qty: 90 TABLET | Refills: 1 | Status: SHIPPED | OUTPATIENT
Start: 2025-04-29

## 2025-04-29 RX ORDER — TRAZODONE HYDROCHLORIDE 50 MG/1
50 TABLET ORAL NIGHTLY
Qty: 90 TABLET | Refills: 1 | Status: SHIPPED | OUTPATIENT
Start: 2025-04-29

## 2025-05-21 ENCOUNTER — TELEPHONE (OUTPATIENT)
Facility: CLINIC | Age: 75
End: 2025-05-21

## 2025-05-21 NOTE — TELEPHONE ENCOUNTER
Patient requesting 4/14/2025 lab results be mailed to her. She is unable to access [facility].     # [phone]

## 2025-05-27 RX ORDER — DILTIAZEM HYDROCHLORIDE 120 MG/1
120 CAPSULE, EXTENDED RELEASE ORAL DAILY
Qty: 90 CAPSULE | Refills: 3 | Status: SHIPPED | OUTPATIENT
Start: 2025-05-27

## 2025-05-27 RX ORDER — CLOPIDOGREL BISULFATE 75 MG/1
75 TABLET ORAL NIGHTLY
Qty: 90 TABLET | Refills: 3 | Status: SHIPPED | OUTPATIENT
Start: 2025-05-27

## 2025-06-04 RX ORDER — CHLORHEXIDINE GLUCONATE ORAL RINSE 1.2 MG/ML
15 SOLUTION DENTAL EVERY 12 HOURS
Qty: 473 ML | Refills: 3 | Status: SHIPPED | OUTPATIENT
Start: 2025-06-04

## 2025-06-04 NOTE — TELEPHONE ENCOUNTER
Patient calls in for a refill of her mouth wash sent to the [facility] on [address].

## (undated) DEVICE — INTENDED FOR TISSUE SEPARATION, AND OTHER PROCEDURES THAT REQUIRE A SHARP SURGICAL BLADE TO PUNCTURE OR CUT.: Brand: BARD-PARKER ®  SAFETY SCALPED

## (undated) DEVICE — DRSG AQUACEL SURG 3.5X6IN -- CONVERT TO ITEM 369227

## (undated) DEVICE — ELECTRODE PT RET AD L9FT HI MOIST COND ADH HYDRGEL CORDED

## (undated) DEVICE — PENCIL ES L3M BTTN SWCH S STL HEX LOK BLDE ELECTRD HOLSTER

## (undated) DEVICE — Z DISCONTINUED USE 2428345 SUTURE VICRYL SZ 2-0 L36IN ABSRB UD L40MM CT 1/2 CIR J957H

## (undated) DEVICE — GAUZE,SPONGE,AVANT,4"X4",4PLY,STRL,10/TR: Brand: MEDLINE

## (undated) DEVICE — 3M™ TEGADERM™ TRANSPARENT FILM DRESSING FRAME STYLE, 1626W, 4 IN X 4-3/4 IN (10 CM X 12 CM), 50/CT 4CT/CASE: Brand: 3M™ TEGADERM™

## (undated) DEVICE — SURGICAL PROCEDURE TRAY SUTURE

## (undated) DEVICE — DRESSING POSTOP AG PRISMASEAL 3.5X6IN

## (undated) DEVICE — TOWEL,OR,DSP,ST,BLUE,STD,2/PK,40PK/CS: Brand: MEDLINE

## (undated) DEVICE — 3M™ STERI-DRAPE™ INCISE DRAPE 1050 (60CM X 45CM): Brand: STERI-DRAPE™

## (undated) DEVICE — GOWN,SIRUS,FABRNF,XL,20/CS: Brand: MEDLINE

## (undated) DEVICE — HYPODERMIC SAFETY NEEDLE: Brand: MAGELLAN